# Patient Record
Sex: FEMALE | Race: OTHER | NOT HISPANIC OR LATINO
[De-identification: names, ages, dates, MRNs, and addresses within clinical notes are randomized per-mention and may not be internally consistent; named-entity substitution may affect disease eponyms.]

---

## 2017-10-23 ENCOUNTER — APPOINTMENT (OUTPATIENT)
Dept: INFECTIOUS DISEASE | Facility: CLINIC | Age: 57
End: 2017-10-23

## 2021-05-25 ENCOUNTER — APPOINTMENT (OUTPATIENT)
Dept: SURGICAL ONCOLOGY | Facility: CLINIC | Age: 61
End: 2021-05-25
Payer: COMMERCIAL

## 2021-05-25 VITALS
HEIGHT: 62 IN | BODY MASS INDEX: 27.97 KG/M2 | SYSTOLIC BLOOD PRESSURE: 168 MMHG | WEIGHT: 152 LBS | OXYGEN SATURATION: 96 % | DIASTOLIC BLOOD PRESSURE: 94 MMHG | RESPIRATION RATE: 18 BRPM | HEART RATE: 94 BPM

## 2021-05-25 PROCEDURE — 99204 OFFICE O/P NEW MOD 45 MIN: CPT

## 2021-05-27 NOTE — HISTORY OF PRESENT ILLNESS
[de-identified] : Ms. STEPHANY MACIEL is a 60 year old female who present today for initial consultation for left breast invasive ductal carcinoma. Referred by Dr. Nadia Troncoso\par \par  Patient complaint of left  palpable breast mass since few months, denies nipple discharge, skin changes, inversion or breast pain. Denies constitutional symptoms. \par \par Most recent mammo 5/6/21: showed left 2:00 highly suspicious mass and prominent left axillary lymph node for which an US guided core biopsy was recommended \par \par 5/17/21: left breast US guide core biopsy showed invasive poorly differentiated ductal carcinoma associated with chronic inflammatory infiltrate at the left 12:00 position. The left axillary lymph node biopsy demonstrated a reactive lymph node, negative for carcinoma. These findings are concordant with imaging. ER- ND-, Her 2- pending FISH. Left axillary LN- reactive on pathology.\par \par Family history includes breast cancer in cousin Maternal at age 40. \par \par PCP Dr. Nadia Troncoso \par [address] [facility]\par [place], [place] \par

## 2021-05-27 NOTE — PHYSICAL EXAM
[FreeTextEntry1] : COVID-19 precautions as per [facility] policy was universally followed\par  [de-identified] : left breast palpable mass in the upper outer quadrant- 3cm firm. mobile non tender, bilateral nipple/areolar complex-insignificant, no palpable lymphadenopathy in bilateral axilla and/or neck. A certified chaperone present during my exam at all times.

## 2021-05-27 NOTE — ASSESSMENT
[FreeTextEntry1] : IMP 60 year old female newly diagnosis 2.5 cm left breast invasive ductal carcinoma - suspecting triple negative pending FISH for [id number] status.\par \par PLAN: \par - Will refer patient  to med/onc to discuss systemic chemotherapy options in the neoadjuvant setting as the pt is preferring to consider a lumpectomy. given the size and receptor status- would consider neoadjuvant chemotherapy in this setting. \par - discussed Left breast lumpectomy vs. mastectomy - however she prefers to have a lumpectomy in the future.\par - Discussed with Dr.Patel- Troncoso and . \par - Will be available for port placement if needed.\par I have discussed the diagnosis, therapeutic plan and options with the patient at length. Patient expressed verbal understanding to proceed with proposed plan. All questions answered.\par

## 2021-05-27 NOTE — CONSULT LETTER
[Consult Letter:] : I had the pleasure of evaluating your patient, [unfilled]. [Please see my note below.] : Please see my note below. [Sincerely,] : Sincerely, [Consult Closing:] : Thank you very much for allowing me to participate in the care of this patient.  If you have any questions, please do not hesitate to contact me. [Dear  ___] : Dear  [unfilled], [( Thank you for referring [unfilled] for consultation for _____ )] : Thank you for referring [unfilled] for consultation for [unfilled] [FreeTextEntry2] : \par  [FreeTextEntry3] : Nate Arita MD, FICS, FACS\par , Surgical Oncology \par [employer] \par [address]\[place] [address]\par \[place] [address]\par \par (mob) [phone]\par (o) [phone]\par (f) [phone]\par   [DrRabia  ___] : Dr. XIAO

## 2021-06-03 ENCOUNTER — RESULT REVIEW (OUTPATIENT)
Age: 61
End: 2021-06-03

## 2021-06-03 ENCOUNTER — OUTPATIENT (OUTPATIENT)
Dept: OUTPATIENT SERVICES | Facility: HOSPITAL | Age: 61
LOS: 1 days | End: 2021-06-03
Payer: COMMERCIAL

## 2021-06-03 DIAGNOSIS — C50.912 MALIGNANT NEOPLASM OF UNSPECIFIED SITE OF LEFT FEMALE BREAST: ICD-10-CM

## 2021-06-03 PROCEDURE — 88321 CONSLTJ&REPRT SLD PREP ELSWR: CPT

## 2021-06-04 LAB — SURGICAL PATHOLOGY STUDY: SIGNIFICANT CHANGE UP

## 2021-06-08 ENCOUNTER — APPOINTMENT (OUTPATIENT)
Dept: BREAST CENTER | Facility: CLINIC | Age: 61
End: 2021-06-08
Payer: COMMERCIAL

## 2021-06-08 ENCOUNTER — NON-APPOINTMENT (OUTPATIENT)
Age: 61
End: 2021-06-08

## 2021-06-08 VITALS
WEIGHT: 152 LBS | HEIGHT: 62 IN | SYSTOLIC BLOOD PRESSURE: 147 MMHG | BODY MASS INDEX: 27.97 KG/M2 | DIASTOLIC BLOOD PRESSURE: 78 MMHG | HEART RATE: 101 BPM

## 2021-06-08 DIAGNOSIS — Z78.9 OTHER SPECIFIED HEALTH STATUS: ICD-10-CM

## 2021-06-08 DIAGNOSIS — Z87.891 PERSONAL HISTORY OF NICOTINE DEPENDENCE: ICD-10-CM

## 2021-06-08 PROCEDURE — 99205 OFFICE O/P NEW HI 60 MIN: CPT

## 2021-06-11 PROBLEM — Z87.891 FORMER SMOKER: Status: ACTIVE | Noted: 2021-06-08

## 2021-06-11 PROBLEM — Z78.9 CAFFEINE USE: Status: ACTIVE | Noted: 2021-06-08

## 2021-06-16 ENCOUNTER — APPOINTMENT (OUTPATIENT)
Dept: BREAST CENTER | Facility: CLINIC | Age: 61
End: 2021-06-16
Payer: COMMERCIAL

## 2021-06-16 ENCOUNTER — APPOINTMENT (OUTPATIENT)
Dept: HEMATOLOGY ONCOLOGY | Facility: CLINIC | Age: 61
End: 2021-06-16
Payer: COMMERCIAL

## 2021-06-16 ENCOUNTER — APPOINTMENT (OUTPATIENT)
Dept: BREAST CENTER | Facility: CLINIC | Age: 61
End: 2021-06-16

## 2021-06-16 VITALS
HEART RATE: 87 BPM | HEIGHT: 62 IN | BODY MASS INDEX: 28.52 KG/M2 | TEMPERATURE: 98.3 F | RESPIRATION RATE: 18 BRPM | SYSTOLIC BLOOD PRESSURE: 184 MMHG | WEIGHT: 155 LBS | DIASTOLIC BLOOD PRESSURE: 90 MMHG | OXYGEN SATURATION: 98 %

## 2021-06-16 VITALS — OXYGEN SATURATION: 98 % | BODY MASS INDEX: 28.52 KG/M2 | HEART RATE: 81 BPM | WEIGHT: 155 LBS | HEIGHT: 62 IN

## 2021-06-16 LAB
BASOPHILS # BLD AUTO: 0.02 K/UL
BASOPHILS NFR BLD AUTO: 0.4 %
EOSINOPHIL # BLD AUTO: 0.05 K/UL
EOSINOPHIL NFR BLD AUTO: 0.9 %
HCT VFR BLD CALC: 38.6 %
HGB BLD-MCNC: 12.6 G/DL
IMM GRANULOCYTES NFR BLD AUTO: 0 %
LYMPHOCYTES # BLD AUTO: 2.8 K/UL
LYMPHOCYTES NFR BLD AUTO: 52.9 %
MAN DIFF?: NORMAL
MCHC RBC-ENTMCNC: 27.8 PG
MCHC RBC-ENTMCNC: 32.6 GM/DL
MCV RBC AUTO: 85 FL
MONOCYTES # BLD AUTO: 0.35 K/UL
MONOCYTES NFR BLD AUTO: 6.6 %
NEUTROPHILS # BLD AUTO: 2.07 K/UL
NEUTROPHILS NFR BLD AUTO: 39.2 %
PLATELET # BLD AUTO: 230 K/UL
RBC # BLD: 4.54 M/UL
RBC # FLD: 11.9 %
WBC # FLD AUTO: 5.29 K/UL

## 2021-06-16 PROCEDURE — 99213 OFFICE O/P EST LOW 20 MIN: CPT

## 2021-06-16 PROCEDURE — 99205 OFFICE O/P NEW HI 60 MIN: CPT

## 2021-06-16 NOTE — DATA REVIEWED
[FreeTextEntry1] : 5/6/21: Julián DXMG & US ([facility]): heterogeneously dense, L - 2.6cm ill-defined mass in reigon of palpable concern, inferior areas of asymmetry which efface, US - L - 2.9cm ill-defined hypoechoic mass 2:00 5FN, 0.2cm complicated cyst 11:00 6FN. BIRADS 5.\par 5/12/21: L US Guided bx x2 ([facility]): "R shaped clip"  L 2:00 15FN - 1.2cm invasive ductal carcinoma w/ assoc chronic inflammatory infiltrate, poorly differentiated , ER (-), ID (-), HER2 Equivocal, FISH (+)\par \par L 1.1cm axillary LN 18FN - "S Shaped Clip" reactive LN, negative for carcinoma

## 2021-06-16 NOTE — ASSESSMENT
[FreeTextEntry1] : 59yo female presents for second opinion regarding newly diagnosed L IDC ER/VA (-), HER2 Equivocal FISH (+).\par \par Reviewed pathology and films with patient and discussed treatment plan including surgery, chemotherapy, radiation therapy, and anti-hormonal therapy.\par \par Discussed the role of chemotherapy in the treatment of this patient and informed her that, given her receptor status, she will eventually require chemotherapy with Herceptin. Reviewed the pros and cons of neoadjuvant chemotherapy including reduction in the size of the cancer allowing it to be more amenable to excision wih a lumpectomy. \par \par Patient is to get MRI to assess extent of disease and evaluate for multifocal ipsilateral or additional contralateral disease. \par \par Discussed with patient genetic predisposition to cancer and referred her to genetic counselor for testing.\par \par Patient referred to medical oncologist Dr. Fontenot to discuss neoadjuvant treatment. \par \par Patient to return in one week for reevaluation

## 2021-06-16 NOTE — PHYSICAL EXAM
[Supple] : supple [No Supraclavicular Adenopathy] : no supraclavicular adenopathy [No Cervical Adenopathy] : no cervical adenopathy [Examined in the supine and seated position] : examined in the supine and seated position [No dominant masses] : no dominant masses in right breast  [No Nipple Retraction] : no left nipple retraction [No Nipple Discharge] : no left nipple discharge [No Axillary Lymphadenopathy] : no left axillary lymphadenopathy [de-identified] : palpable mass maycol brian of biopsy proven cancer with sonographic correlate measuring 2.2cm x 3cm transversely and 2.17cm x 2.3cm longitudinally with bx clip noted

## 2021-06-16 NOTE — PAST MEDICAL HISTORY
[Menarche Age ____] : age at menarche was [unfilled] [Menopause Age____] : age at menopause was [unfilled] [Total Preg ___] : G[unfilled] [Live Births ___] : P[unfilled]  [Abortions ___] : Abortions:[unfilled] [FreeTextEntry5] : n/a [FreeTextEntry6] : n/a [FreeTextEntry7] : n/a [FreeTextEntry8] : yes

## 2021-06-16 NOTE — HISTORY OF PRESENT ILLNESS
[FreeTextEntry1] : 59yo female presents for second opinion evaluation in regards to newly diagnosed L IDC initially noted as a 2.6cm illdefined mass on mammogram with a 2.9cm sonographic correalte 2:00 5FN. Patient notes that she felt it 4 months ago and it has grown since then. FHx of breast ca Mcousin 40s, and M Aunt 50s.

## 2021-06-17 ENCOUNTER — LABORATORY RESULT (OUTPATIENT)
Age: 61
End: 2021-06-17

## 2021-06-17 LAB
25(OH)D3 SERPL-MCNC: 13 NG/ML
ALBUMIN SERPL ELPH-MCNC: 4.3 G/DL
ALP BLD-CCNC: 69 U/L
ALT SERPL-CCNC: 19 U/L
ANION GAP SERPL CALC-SCNC: 9 MMOL/L
AST SERPL-CCNC: 23 U/L
BILIRUB SERPL-MCNC: 0.7 MG/DL
BUN SERPL-MCNC: 8 MG/DL
CALCIUM SERPL-MCNC: 9.7 MG/DL
CANCER AG27-29 SERPL-ACNC: 12 U/ML
CEA SERPL-MCNC: 1.1 NG/ML
CHLORIDE SERPL-SCNC: 104 MMOL/L
CO2 SERPL-SCNC: 26 MMOL/L
CREAT SERPL-MCNC: 0.72 MG/DL
CRP SERPL-MCNC: <3 MG/L
ERYTHROCYTE [SEDIMENTATION RATE] IN BLOOD BY WESTERGREN METHOD: 18 MM/HR
GLUCOSE SERPL-MCNC: 88 MG/DL
LDH SERPL-CCNC: 242 U/L
MAGNESIUM SERPL-MCNC: 2.1 MG/DL
PHOSPHATE SERPL-MCNC: 2.5 MG/DL
POTASSIUM SERPL-SCNC: 4.6 MMOL/L
PROT SERPL-MCNC: 7.2 G/DL
SODIUM SERPL-SCNC: 139 MMOL/L
URATE SERPL-MCNC: 4.3 MG/DL

## 2021-06-18 ENCOUNTER — LABORATORY RESULT (OUTPATIENT)
Age: 61
End: 2021-06-18

## 2021-06-18 ENCOUNTER — APPOINTMENT (OUTPATIENT)
Dept: BREAST CENTER | Facility: CLINIC | Age: 61
End: 2021-06-18

## 2021-06-18 ENCOUNTER — APPOINTMENT (OUTPATIENT)
Dept: SURGICAL ONCOLOGY | Facility: AMBULATORY SURGERY CENTER | Age: 61
End: 2021-06-18

## 2021-06-18 ENCOUNTER — NON-APPOINTMENT (OUTPATIENT)
Age: 61
End: 2021-06-18

## 2021-06-23 ENCOUNTER — NON-APPOINTMENT (OUTPATIENT)
Age: 61
End: 2021-06-23

## 2021-06-24 ENCOUNTER — LABORATORY RESULT (OUTPATIENT)
Age: 61
End: 2021-06-24

## 2021-06-24 ENCOUNTER — OUTPATIENT (OUTPATIENT)
Dept: OUTPATIENT SERVICES | Facility: HOSPITAL | Age: 61
LOS: 1 days | End: 2021-06-24
Payer: COMMERCIAL

## 2021-06-24 DIAGNOSIS — C50.912 MALIGNANT NEOPLASM OF UNSPECIFIED SITE OF LEFT FEMALE BREAST: ICD-10-CM

## 2021-06-24 DIAGNOSIS — Z17.1 ESTROGEN RECEPTOR NEGATIVE STATUS [ER-]: ICD-10-CM

## 2021-06-24 PROCEDURE — 93306 TTE W/DOPPLER COMPLETE: CPT

## 2021-06-24 PROCEDURE — 93356 MYOCRD STRAIN IMG SPCKL TRCK: CPT | Mod: 26

## 2021-06-24 PROCEDURE — 93306 TTE W/DOPPLER COMPLETE: CPT | Mod: 26

## 2021-06-25 ENCOUNTER — RESULT REVIEW (OUTPATIENT)
Age: 61
End: 2021-06-25

## 2021-06-25 ENCOUNTER — APPOINTMENT (OUTPATIENT)
Dept: INTERVENTIONAL RADIOLOGY/VASCULAR | Facility: HOSPITAL | Age: 61
End: 2021-06-25
Payer: COMMERCIAL

## 2021-06-25 ENCOUNTER — OUTPATIENT (OUTPATIENT)
Dept: OUTPATIENT SERVICES | Facility: HOSPITAL | Age: 61
LOS: 1 days | End: 2021-06-25
Payer: COMMERCIAL

## 2021-06-25 PROCEDURE — 99152 MOD SED SAME PHYS/QHP 5/>YRS: CPT

## 2021-06-25 PROCEDURE — 99153 MOD SED SAME PHYS/QHP EA: CPT

## 2021-06-25 PROCEDURE — C1769: CPT

## 2021-06-25 PROCEDURE — 36561 INSERT TUNNELED CV CATH: CPT

## 2021-06-25 PROCEDURE — 77001 FLUOROGUIDE FOR VEIN DEVICE: CPT

## 2021-06-25 PROCEDURE — 76937 US GUIDE VASCULAR ACCESS: CPT | Mod: 26

## 2021-06-25 PROCEDURE — C1788: CPT

## 2021-06-25 PROCEDURE — 77001 FLUOROGUIDE FOR VEIN DEVICE: CPT | Mod: 26

## 2021-06-25 PROCEDURE — 76937 US GUIDE VASCULAR ACCESS: CPT

## 2021-06-25 NOTE — ASSESSMENT
[FreeTextEntry1] : 61 yo AA female referred by Dr. Tello Shah for evaluation of newly diagnosed left breast cancer - at least 2.9 cm, \par IDC, ER neg, [place] neg, HER2 patricia IHC equivocal, FISH amplified.  Left axilla LN negative.\par \par Patient accompanied by her . We reviewed the pathobiology of breast cancer, indication for NACT with HER2 targeted treatment.  [facility]. \par \par Side effects and schema reviewed with patient. \par \par \par Plan:\par TCHP  x 6 \par Mediport \par ECHO\par \par Taxotere 75 mg/m2 x 6 q 3 weeks = 129 mg\par Carboplatin AUC 5 x 6 Q 3 weeks = 590 mg\par Herceptin cycle 1 8 mg/kg x 1 = 560 mg\par Herceptin cycle 2- 6  6 mg/kg = 420 mg\par Perjeta 840 mg x 1 cycle 1\par Perjeta cycle 2-6 = 420 mg \par

## 2021-06-25 NOTE — PHYSICAL EXAM
[Fully active, able to carry on all pre-disease performance without restriction] : Status 0 - Fully active, able to carry on all pre-disease performance without restriction [Normal] : bilateral breasts without nipple retraction, skin dimpling or palpable masses; the bilateral axillae are without adenopathy [de-identified] : left breast 2 o'clock - 5 cm form nipple non-fixed mass 5  cm

## 2021-06-25 NOTE — HISTORY OF PRESENT ILLNESS
[Disease: _____________________] : Disease: [unfilled] [T: ___] : T[unfilled] [N: ___] : N[unfilled] [de-identified] : 60 year old female presents today for initial consultation of breast cancer, referred by Dr. Shah.  She was sent for diagnostic mammogram for which she felt a left upper quadrant palpalbe mass x 4 months.  \par \par Most recent mammo 5/6/21: showed left 2:00 highly suspicious mass and prominent left axillary lymph node for which an US guided core biopsy was recommended \par \par 5/17/21: left breast US guide core biopsy showed invasive poorly differentiated ductal carcinoma associated with chronic inflammatory infiltrate at the left 12:00 position. The left axillary lymph node biopsy demonstrated a reactive lymph node, negative for carcinoma. These findings are concordant with imaging. ER- [place]-, Her 2 amplified FISH. Left axillary LN- reactive on pathology.\par \par \par Risk Factors:\par Age at menarche- 11\par Age at menopause-49\par G6,P6\par Age at first live birth-17\par OCP-denies\par HRT-denies\par Family History-denies\par Genetics-denies\par Smoker-former smoker [de-identified] : ER -neg, [place] neg, HER 2 amplified

## 2021-06-29 RX ORDER — CARBOPLATIN 50 MG
590 VIAL (EA) INTRAVENOUS ONCE
Refills: 0 | Status: COMPLETED | OUTPATIENT
Start: 2021-09-22 | End: 2021-09-22

## 2021-06-29 RX ORDER — CARBOPLATIN 50 MG
590 VIAL (EA) INTRAVENOUS ONCE
Refills: 0 | Status: COMPLETED | OUTPATIENT
Start: 2021-10-13 | End: 2021-10-13

## 2021-06-29 RX ORDER — PEGFILGRASTIM-CBQV 6 MG/.6ML
6 INJECTION, SOLUTION SUBCUTANEOUS ONCE
Refills: 0 | Status: COMPLETED | OUTPATIENT
Start: 2021-06-30 | End: 2021-06-30

## 2021-06-29 RX ORDER — ACETAMINOPHEN 500 MG
650 TABLET ORAL ONCE
Refills: 0 | Status: DISCONTINUED | OUTPATIENT
Start: 2021-10-13 | End: 2021-10-27

## 2021-06-29 RX ORDER — SODIUM CHLORIDE 9 MG/ML
250 INJECTION INTRAMUSCULAR; INTRAVENOUS; SUBCUTANEOUS ONCE
Refills: 0 | Status: DISCONTINUED | OUTPATIENT
Start: 2021-07-21 | End: 2021-08-04

## 2021-06-29 RX ORDER — PEGFILGRASTIM-CBQV 6 MG/.6ML
6 INJECTION, SOLUTION SUBCUTANEOUS ONCE
Refills: 0 | Status: COMPLETED | OUTPATIENT
Start: 2021-09-22 | End: 2021-09-22

## 2021-06-29 RX ORDER — CARBOPLATIN 50 MG
590 VIAL (EA) INTRAVENOUS ONCE
Refills: 0 | Status: COMPLETED | OUTPATIENT
Start: 2021-07-21 | End: 2021-07-21

## 2021-06-29 RX ORDER — DEXAMETHASONE 0.5 MG/5ML
12 ELIXIR ORAL ONCE
Refills: 0 | Status: COMPLETED | OUTPATIENT
Start: 2021-07-21 | End: 2021-07-21

## 2021-06-29 RX ORDER — PEGFILGRASTIM-CBQV 6 MG/.6ML
6 INJECTION, SOLUTION SUBCUTANEOUS ONCE
Refills: 0 | Status: COMPLETED | OUTPATIENT
Start: 2021-07-21 | End: 2021-07-21

## 2021-06-29 RX ORDER — DEXAMETHASONE 0.5 MG/5ML
12 ELIXIR ORAL ONCE
Refills: 0 | Status: COMPLETED | OUTPATIENT
Start: 2021-09-22 | End: 2021-09-22

## 2021-06-29 RX ORDER — PALONOSETRON HYDROCHLORIDE 0.25 MG/5ML
0.25 INJECTION, SOLUTION INTRAVENOUS ONCE
Refills: 0 | Status: COMPLETED | OUTPATIENT
Start: 2021-09-22 | End: 2021-09-22

## 2021-06-29 RX ORDER — DOCETAXEL 20 MG/ML
129 INJECTION, SOLUTION, CONCENTRATE INTRAVENOUS ONCE
Refills: 0 | Status: COMPLETED | OUTPATIENT
Start: 2021-09-22 | End: 2021-09-22

## 2021-06-29 RX ORDER — PALONOSETRON HYDROCHLORIDE 0.25 MG/5ML
0.25 INJECTION, SOLUTION INTRAVENOUS ONCE
Refills: 0 | Status: COMPLETED | OUTPATIENT
Start: 2021-07-21 | End: 2021-07-21

## 2021-06-29 RX ORDER — PEGFILGRASTIM-CBQV 6 MG/.6ML
6 INJECTION, SOLUTION SUBCUTANEOUS ONCE
Refills: 0 | Status: COMPLETED | OUTPATIENT
Start: 2021-08-11 | End: 2021-08-11

## 2021-06-29 RX ORDER — PALONOSETRON HYDROCHLORIDE 0.25 MG/5ML
0.25 INJECTION, SOLUTION INTRAVENOUS ONCE
Refills: 0 | Status: COMPLETED | OUTPATIENT
Start: 2021-10-13 | End: 2021-10-13

## 2021-06-29 NOTE — HISTORY OF PRESENT ILLNESS
[FreeTextEntry1] : 61yo female presents for second opinion evaluation in regards to newly diagnosed L IDC initially noted as a 2.6cm illdefined mass on mammogram with a 2.9cm sonographic correalte 2:00 5FN. Patient notes that she felt it 4 months ago and it has grown since then. FHx of breast ca Mcousin 40s, and M Aunt 50s. \par \par She met with Dr. Fontenot earlier today for her initial consult. She has an MRI and an appointment with the genetic counselor scheduled for 6/18.
No

## 2021-06-29 NOTE — PHYSICAL EXAM
[Supple] : supple [No Supraclavicular Adenopathy] : no supraclavicular adenopathy [No Cervical Adenopathy] : no cervical adenopathy [Examined in the supine and seated position] : examined in the supine and seated position [No dominant masses] : no dominant masses in right breast  [No Nipple Retraction] : no left nipple retraction [No Nipple Discharge] : no left nipple discharge [No Axillary Lymphadenopathy] : no left axillary lymphadenopathy [de-identified] : palpable mass in area of bx proven ca 2:00 5FN

## 2021-06-29 NOTE — DATA REVIEWED
[FreeTextEntry1] : 5/6/21: Julián DXMG & US ([facility]): heterogeneously dense, L - 2.6cm ill-defined mass in reigon of palpable concern, inferior areas of asymmetry which efface, US - L - 2.9cm ill-defined hypoechoic mass 2:00 5FN, 0.2cm complicated cyst 11:00 6FN. BIRADS 5.\par 5/12/21: L US Guided bx x2 ([facility]): "R shaped clip"  L 2:00 15FN - 1.2cm invasive ductal carcinoma w/ assoc chronic inflammatory infiltrate, poorly differentiated , ER (-), [place] (-), HER2 Equivocal, FISH (+)\par \par L 1.1cm axillary LN 18FN - "S Shaped Clip" reactive LN, negative for carcinoma

## 2021-06-29 NOTE — PAST MEDICAL HISTORY
[Menarche Age ____] : age at menarche was [unfilled] [Menopause Age____] : age at menopause was [unfilled] [Total Preg ___] : G[unfilled] [Live Births ___] : P[unfilled]  [Abortions ___] : Abortions:[unfilled] [FreeTextEntry5] : n/a [FreeTextEntry7] : n/a [FreeTextEntry6] : n/a [FreeTextEntry8] : yes

## 2021-06-29 NOTE — ASSESSMENT
[FreeTextEntry1] : 59yo female presents for f/u consultation for L IDC ER/NJ (-), HER2 (+). Patient met with medical oncologist Dr. Fontenot today to discuss neoadjuvant chemotherapy. Patient agreed with plan. We discussed treatment plan and reviewed surgical options including lumpectomy with potential contralateral mastopexy for symmetry vs. mastectomy. Informed patient that definitive decision for surgery will be made based off of her preference along with MRI results and response to NAC. \par \par Patient is to have MRI and appointment with genetic counselor on Friday 6/18.\par \par Patient is to continue following with Dr. Fontenot.\par \par Patient is to return for reassessment in 4-6 weeks.

## 2021-06-30 ENCOUNTER — APPOINTMENT (OUTPATIENT)
Age: 61
End: 2021-06-30

## 2021-06-30 ENCOUNTER — APPOINTMENT (OUTPATIENT)
Dept: HEMATOLOGY ONCOLOGY | Facility: CLINIC | Age: 61
End: 2021-06-30
Payer: COMMERCIAL

## 2021-06-30 ENCOUNTER — OUTPATIENT (OUTPATIENT)
Dept: OUTPATIENT SERVICES | Facility: HOSPITAL | Age: 61
LOS: 1 days | End: 2021-06-30
Payer: COMMERCIAL

## 2021-06-30 VITALS
HEIGHT: 62 IN | DIASTOLIC BLOOD PRESSURE: 75 MMHG | TEMPERATURE: 98.3 F | SYSTOLIC BLOOD PRESSURE: 146 MMHG | OXYGEN SATURATION: 97 % | WEIGHT: 153 LBS | RESPIRATION RATE: 18 BRPM | HEART RATE: 107 BPM | BODY MASS INDEX: 28.16 KG/M2

## 2021-06-30 VITALS
RESPIRATION RATE: 20 BRPM | TEMPERATURE: 98 F | HEART RATE: 70 BPM | SYSTOLIC BLOOD PRESSURE: 110 MMHG | OXYGEN SATURATION: 99 % | DIASTOLIC BLOOD PRESSURE: 78 MMHG

## 2021-06-30 DIAGNOSIS — C50.919 MALIGNANT NEOPLASM OF UNSPECIFIED SITE OF UNSPECIFIED FEMALE BREAST: ICD-10-CM

## 2021-06-30 LAB
ALBUMIN SERPL ELPH-MCNC: 3.7 G/DL — SIGNIFICANT CHANGE UP (ref 3.3–5)
ALP SERPL-CCNC: 62 U/L — SIGNIFICANT CHANGE UP (ref 40–120)
ALT FLD-CCNC: 22 U/L — SIGNIFICANT CHANGE UP (ref 10–45)
ANION GAP SERPL CALC-SCNC: 5 MMOL/L — SIGNIFICANT CHANGE UP (ref 5–17)
AST SERPL-CCNC: 25 U/L — SIGNIFICANT CHANGE UP (ref 10–40)
BILIRUB SERPL-MCNC: 0.9 MG/DL — SIGNIFICANT CHANGE UP (ref 0.2–1.2)
BUN SERPL-MCNC: 12 MG/DL — SIGNIFICANT CHANGE UP (ref 7–23)
CALCIUM SERPL-MCNC: 9.6 MG/DL — SIGNIFICANT CHANGE UP (ref 8.4–10.5)
CHLORIDE SERPL-SCNC: 108 MMOL/L — SIGNIFICANT CHANGE UP (ref 96–108)
CO2 SERPL-SCNC: 26 MMOL/L — SIGNIFICANT CHANGE UP (ref 22–31)
CREAT SERPL-MCNC: 0.7 MG/DL — SIGNIFICANT CHANGE UP (ref 0.5–1.3)
GLUCOSE SERPL-MCNC: 130 MG/DL — HIGH (ref 70–99)
HCT VFR BLD CALC: 37.5 % — SIGNIFICANT CHANGE UP (ref 34.5–45)
HGB BLD-MCNC: 12.1 G/DL — SIGNIFICANT CHANGE UP (ref 11.5–15.5)
LYMPHOCYTES # BLD AUTO: 1 K/UL — SIGNIFICANT CHANGE UP (ref 1–3.3)
LYMPHOCYTES # BLD AUTO: 11.9 % — LOW (ref 13–44)
MCHC RBC-ENTMCNC: 28.1 PG — SIGNIFICANT CHANGE UP (ref 27–34)
MCHC RBC-ENTMCNC: 32.3 GM/DL — SIGNIFICANT CHANGE UP (ref 32–36)
MCV RBC AUTO: 87 FL — SIGNIFICANT CHANGE UP (ref 80–100)
NEUTROPHILS # BLD AUTO: 7.4 K/UL — SIGNIFICANT CHANGE UP (ref 1.8–7.4)
NEUTROPHILS NFR BLD AUTO: 87.1 % — HIGH (ref 43–77)
PLATELET # BLD AUTO: 222 K/UL — SIGNIFICANT CHANGE UP (ref 150–400)
POTASSIUM SERPL-MCNC: 3.9 MMOL/L — SIGNIFICANT CHANGE UP (ref 3.5–5.3)
POTASSIUM SERPL-SCNC: 3.9 MMOL/L — SIGNIFICANT CHANGE UP (ref 3.5–5.3)
PROT SERPL-MCNC: 6.9 G/DL — SIGNIFICANT CHANGE UP (ref 6–8.3)
RBC # BLD: 4.31 M/UL — SIGNIFICANT CHANGE UP (ref 3.8–5.2)
RBC # FLD: 12.3 % — SIGNIFICANT CHANGE UP (ref 10.3–14.5)
SODIUM SERPL-SCNC: 139 MMOL/L — SIGNIFICANT CHANGE UP (ref 135–145)
WBC # BLD: 8.5 K/UL — SIGNIFICANT CHANGE UP (ref 3.8–10.5)
WBC # FLD AUTO: 8.5 K/UL — SIGNIFICANT CHANGE UP (ref 3.8–10.5)

## 2021-06-30 PROCEDURE — 99215 OFFICE O/P EST HI 40 MIN: CPT

## 2021-06-30 RX ORDER — SODIUM CHLORIDE 9 MG/ML
250 INJECTION INTRAMUSCULAR; INTRAVENOUS; SUBCUTANEOUS ONCE
Refills: 0 | Status: COMPLETED | OUTPATIENT
Start: 2021-06-30 | End: 2021-06-30

## 2021-06-30 RX ORDER — FOSAPREPITANT DIMEGLUMINE 150 MG/5ML
150 INJECTION, POWDER, LYOPHILIZED, FOR SOLUTION INTRAVENOUS ONCE
Refills: 0 | Status: COMPLETED | OUTPATIENT
Start: 2021-06-30 | End: 2021-06-30

## 2021-06-30 RX ORDER — PALONOSETRON HYDROCHLORIDE 0.25 MG/5ML
0.25 INJECTION, SOLUTION INTRAVENOUS ONCE
Refills: 0 | Status: COMPLETED | OUTPATIENT
Start: 2021-06-30 | End: 2021-06-30

## 2021-06-30 RX ORDER — DEXAMETHASONE 0.5 MG/5ML
12 ELIXIR ORAL ONCE
Refills: 0 | Status: COMPLETED | OUTPATIENT
Start: 2021-06-30 | End: 2021-06-30

## 2021-06-30 RX ORDER — PERTUZUMAB 30 MG/ML
840 INJECTION, SOLUTION, CONCENTRATE INTRAVENOUS ONCE
Refills: 0 | Status: COMPLETED | OUTPATIENT
Start: 2021-06-30 | End: 2021-06-30

## 2021-06-30 RX ORDER — TRASTUZUMAB-DKST 420 MG/20ML
560 INJECTION, POWDER, LYOPHILIZED, FOR SOLUTION INTRAVENOUS ONCE
Refills: 0 | Status: COMPLETED | OUTPATIENT
Start: 2021-06-30 | End: 2021-06-30

## 2021-06-30 RX ORDER — DOCETAXEL 20 MG/ML
129 INJECTION, SOLUTION, CONCENTRATE INTRAVENOUS ONCE
Refills: 0 | Status: COMPLETED | OUTPATIENT
Start: 2021-06-30 | End: 2021-06-30

## 2021-06-30 RX ORDER — CARBOPLATIN 50 MG
590 VIAL (EA) INTRAVENOUS ONCE
Refills: 0 | Status: COMPLETED | OUTPATIENT
Start: 2021-06-30 | End: 2021-06-30

## 2021-06-30 RX ORDER — ACETAMINOPHEN 500 MG
650 TABLET ORAL ONCE
Refills: 0 | Status: COMPLETED | OUTPATIENT
Start: 2021-06-30 | End: 2021-06-30

## 2021-06-30 RX ADMIN — PALONOSETRON HYDROCHLORIDE 0.25 MILLIGRAM(S): 0.25 INJECTION, SOLUTION INTRAVENOUS at 12:26

## 2021-06-30 RX ADMIN — Medication 590 MILLIGRAM(S): at 16:30

## 2021-06-30 RX ADMIN — PEGFILGRASTIM-CBQV 6 MILLIGRAM(S): 6 INJECTION, SOLUTION SUBCUTANEOUS at 18:03

## 2021-06-30 RX ADMIN — TRASTUZUMAB-DKST 560 MILLIGRAM(S): 420 INJECTION, POWDER, LYOPHILIZED, FOR SOLUTION INTRAVENOUS at 15:00

## 2021-06-30 RX ADMIN — Medication 650 MILLIGRAM(S): at 12:25

## 2021-06-30 RX ADMIN — FOSAPREPITANT DIMEGLUMINE 310 MILLIGRAM(S): 150 INJECTION, POWDER, LYOPHILIZED, FOR SOLUTION INTRAVENOUS at 10:40

## 2021-06-30 RX ADMIN — SODIUM CHLORIDE 250 MILLILITER(S): 9 INJECTION INTRAMUSCULAR; INTRAVENOUS; SUBCUTANEOUS at 12:11

## 2021-06-30 RX ADMIN — Medication 618 MILLIGRAM(S): at 16:00

## 2021-06-30 RX ADMIN — Medication 12 MILLIGRAM(S): at 10:40

## 2021-06-30 RX ADMIN — Medication 204.8 MILLIGRAM(S): at 10:26

## 2021-06-30 RX ADMIN — DOCETAXEL 129 MILLIGRAM(S): 20 INJECTION, SOLUTION, CONCENTRATE INTRAVENOUS at 16:00

## 2021-06-30 RX ADMIN — TRASTUZUMAB-DKST 166.67 MILLIGRAM(S): 420 INJECTION, POWDER, LYOPHILIZED, FOR SOLUTION INTRAVENOUS at 13:30

## 2021-06-30 RX ADMIN — PERTUZUMAB 840 MILLIGRAM(S): 30 INJECTION, SOLUTION, CONCENTRATE INTRAVENOUS at 13:30

## 2021-06-30 RX ADMIN — DOCETAXEL 256.45 MILLIGRAM(S): 20 INJECTION, SOLUTION, CONCENTRATE INTRAVENOUS at 15:00

## 2021-06-30 RX ADMIN — FOSAPREPITANT DIMEGLUMINE 150 MILLIGRAM(S): 150 INJECTION, POWDER, LYOPHILIZED, FOR SOLUTION INTRAVENOUS at 11:10

## 2021-06-30 RX ADMIN — PERTUZUMAB 278 MILLIGRAM(S): 30 INJECTION, SOLUTION, CONCENTRATE INTRAVENOUS at 12:30

## 2021-06-30 NOTE — HISTORY OF PRESENT ILLNESS
[de-identified] : 60 year old female presents today for initial consultation of breast cancer, referred by Dr. Shah.  She was sent for diagnostic mammogram for which she felt a left upper quadrant palpalbe mass x 4 months.  \par \par Most recent mammo 5/6/21: showed left 2:00 highly suspicious mass and prominent left axillary lymph node for which an US guided core biopsy was recommended \par \par 5/17/21: left breast US guide core biopsy showed invasive poorly differentiated ductal carcinoma associated with chronic inflammatory infiltrate at the left 12:00 position. The left axillary lymph node biopsy demonstrated a reactive lymph node, negative for carcinoma. These findings are concordant with imaging. ER- [place]-, Her 2 amplified FISH. Left axillary LN- reactive on pathology.\par \par \par Risk Factors:\par Age at menarche- 11\par Age at menopause-49\par G6,P6\par Age at first live birth-17\par OCP-denies\par HRT-denies\par Family History-denies\par Genetics-denies\par Smoker-former smoker [de-identified] : ER -neg, [place] neg, HER 2 amplified

## 2021-06-30 NOTE — ASSESSMENT
[FreeTextEntry1] : 61 yo AA female referred by Dr. Tello Shah for evaluation of newly diagnosed left breast cancer - at least 2.9 cm, \par IDC, ER neg, [place] neg, HER2 patricia IHC equivocal, FISH amplified.  Left axilla LN negative.\par \par Patient accompanied by her . We reviewed the pathobiology of breast cancer, indication for NACT with HER2 targeted treatment.  Patient offered TCHP based on the [facility] protocol. \par \par Side effects and schema reviewed with patient. \par \par 6/30/2021\par pt to start cycle 1 TCHP- tx plan and schema reviewed with pt and spouse\par ECHO 6/24/2021- LVEF 55%\par labs stable\par \par case and mgmt discussed with Dr. Fontenot- return in 1 week for tox check cbc and chem

## 2021-07-02 ENCOUNTER — APPOINTMENT (OUTPATIENT)
Dept: SURGICAL ONCOLOGY | Facility: AMBULATORY SURGERY CENTER | Age: 61
End: 2021-07-02

## 2021-07-02 ENCOUNTER — APPOINTMENT (OUTPATIENT)
Dept: INTERVENTIONAL RADIOLOGY/VASCULAR | Facility: HOSPITAL | Age: 61
End: 2021-07-02

## 2021-07-06 ENCOUNTER — NON-APPOINTMENT (OUTPATIENT)
Age: 61
End: 2021-07-06

## 2021-07-07 ENCOUNTER — APPOINTMENT (OUTPATIENT)
Dept: HEMATOLOGY ONCOLOGY | Facility: CLINIC | Age: 61
End: 2021-07-07

## 2021-07-14 ENCOUNTER — LABORATORY RESULT (OUTPATIENT)
Age: 61
End: 2021-07-14

## 2021-07-14 ENCOUNTER — APPOINTMENT (OUTPATIENT)
Dept: HEMATOLOGY ONCOLOGY | Facility: CLINIC | Age: 61
End: 2021-07-14
Payer: COMMERCIAL

## 2021-07-14 VITALS
DIASTOLIC BLOOD PRESSURE: 77 MMHG | BODY MASS INDEX: 27.23 KG/M2 | WEIGHT: 148 LBS | HEIGHT: 62 IN | RESPIRATION RATE: 18 BRPM | OXYGEN SATURATION: 98 % | SYSTOLIC BLOOD PRESSURE: 121 MMHG | HEART RATE: 105 BPM | TEMPERATURE: 98.5 F

## 2021-07-14 PROCEDURE — 99215 OFFICE O/P EST HI 40 MIN: CPT

## 2021-07-14 NOTE — PHYSICAL EXAM
[Fully active, able to carry on all pre-disease performance without restriction] : Status 0 - Fully active, able to carry on all pre-disease performance without restriction [Normal] : affect appropriate [de-identified] : left breast 2 o'clock - 5 cm form nipple non-fixed mass 5  cm - decreased

## 2021-07-14 NOTE — HISTORY OF PRESENT ILLNESS
[Disease: _____________________] : Disease: [unfilled] [T: ___] : T[unfilled] [N: ___] : N[unfilled] [de-identified] : 60 year old female presents today for initial consultation of breast cancer, referred by Dr. Shah.  She was sent for diagnostic mammogram for which she felt a left upper quadrant palpalbe mass x 4 months.  \par \par Most recent mammo 5/6/21: showed left 2:00 highly suspicious mass and prominent left axillary lymph node for which an US guided core biopsy was recommended \par \par 5/17/21: left breast US guide core biopsy showed invasive poorly differentiated ductal carcinoma associated with chronic inflammatory infiltrate at the left 12:00 position. The left axillary lymph node biopsy demonstrated a reactive lymph node, negative for carcinoma. These findings are concordant with imaging. ER- [place]-, Her 2 amplified FISH. Left axillary LN- reactive on pathology.\par \par \par Risk Factors:\par Age at menarche- 11\par Age at menopause-49\par G6,P6\par Age at first live birth-17\par OCP-denies\par HRT-denies\par Family History-denies\par Genetics-denies\par Smoker-former smoker [de-identified] : ER -neg, [place] neg, HER 2 amplified

## 2021-07-14 NOTE — ASSESSMENT
[FreeTextEntry1] : 59 yo AA female referred by Dr. Tello Shah for evaluation of newly diagnosed left breast cancer - at least 2.9 cm, \par IDC, ER neg, NC neg, HER2 patricia IHC equivocal, FISH amplified.  Left axilla LN negative.\par \par Patient accompanied by her . We reviewed the pathobiology of breast cancer, indication for NACT with HER2 targeted treatment.  Patient offered TCHP based on the [facility] protocol. \par \par Side effects and schema reviewed with patient. \par \par 6/30/2021\par  cycle 1 TCHP- tx plan and schema reviewed with pt and spouse\par ECHO 6/24/2021- LVEF 55%\par labs stable\par \par 7/14/2021\par Cycle 1, day 14\par Nausea - required meds day 3\par Insomnia\par Pelvic pain \par Diarrhea 3 x per day, loose BM -1-2 per day now\par Tingling sensation in fingers \par Pain meds prescribed - tramadol\par Stressed that if no appetite push liquids, and if no tolerance come IV fluids \par  return in 1 week for tx\par \par  check cbc and chem

## 2021-07-19 LAB
CRP SERPL-MCNC: <3 MG/L
ERYTHROCYTE [SEDIMENTATION RATE] IN BLOOD BY WESTERGREN METHOD: 18 MM/HR
LDH SERPL-CCNC: 279 U/L
MAGNESIUM SERPL-MCNC: 2 MG/DL
PHOSPHATE SERPL-MCNC: 1.9 MG/DL
URATE SERPL-MCNC: 4.7 MG/DL

## 2021-07-21 ENCOUNTER — APPOINTMENT (OUTPATIENT)
Dept: HEMATOLOGY ONCOLOGY | Facility: CLINIC | Age: 61
End: 2021-07-21
Payer: COMMERCIAL

## 2021-07-21 ENCOUNTER — OUTPATIENT (OUTPATIENT)
Dept: OUTPATIENT SERVICES | Facility: HOSPITAL | Age: 61
LOS: 1 days | End: 2021-07-21
Payer: COMMERCIAL

## 2021-07-21 ENCOUNTER — APPOINTMENT (OUTPATIENT)
Age: 61
End: 2021-07-21

## 2021-07-21 VITALS
SYSTOLIC BLOOD PRESSURE: 134 MMHG | HEART RATE: 86 BPM | RESPIRATION RATE: 18 BRPM | OXYGEN SATURATION: 97 % | TEMPERATURE: 98 F | DIASTOLIC BLOOD PRESSURE: 78 MMHG

## 2021-07-21 VITALS
OXYGEN SATURATION: 96 % | RESPIRATION RATE: 18 BRPM | SYSTOLIC BLOOD PRESSURE: 144 MMHG | WEIGHT: 154.98 LBS | DIASTOLIC BLOOD PRESSURE: 79 MMHG | HEIGHT: 65 IN | TEMPERATURE: 98 F | HEART RATE: 100 BPM

## 2021-07-21 VITALS
HEIGHT: 62 IN | WEIGHT: 155 LBS | SYSTOLIC BLOOD PRESSURE: 144 MMHG | TEMPERATURE: 98 F | RESPIRATION RATE: 16 BRPM | HEART RATE: 108 BPM | BODY MASS INDEX: 28.52 KG/M2 | DIASTOLIC BLOOD PRESSURE: 79 MMHG | OXYGEN SATURATION: 96 %

## 2021-07-21 DIAGNOSIS — C50.919 MALIGNANT NEOPLASM OF UNSPECIFIED SITE OF UNSPECIFIED FEMALE BREAST: ICD-10-CM

## 2021-07-21 LAB
ALBUMIN SERPL ELPH-MCNC: 3.2 G/DL — LOW (ref 3.3–5)
ALP SERPL-CCNC: 61 U/L — SIGNIFICANT CHANGE UP (ref 40–120)
ALT FLD-CCNC: 36 U/L — SIGNIFICANT CHANGE UP (ref 10–45)
ANION GAP SERPL CALC-SCNC: 7 MMOL/L — SIGNIFICANT CHANGE UP (ref 5–17)
AST SERPL-CCNC: 30 U/L — SIGNIFICANT CHANGE UP (ref 10–40)
BILIRUB SERPL-MCNC: 0.6 MG/DL — SIGNIFICANT CHANGE UP (ref 0.2–1.2)
BUN SERPL-MCNC: 12 MG/DL — SIGNIFICANT CHANGE UP (ref 7–23)
CALCIUM SERPL-MCNC: 8.7 MG/DL — SIGNIFICANT CHANGE UP (ref 8.4–10.5)
CHLORIDE SERPL-SCNC: 106 MMOL/L — SIGNIFICANT CHANGE UP (ref 96–108)
CO2 SERPL-SCNC: 25 MMOL/L — SIGNIFICANT CHANGE UP (ref 22–31)
CREAT SERPL-MCNC: 0.8 MG/DL — SIGNIFICANT CHANGE UP (ref 0.5–1.3)
GLUCOSE SERPL-MCNC: 160 MG/DL — HIGH (ref 70–99)
HCT VFR BLD CALC: 35.3 % — SIGNIFICANT CHANGE UP (ref 34.5–45)
HGB BLD-MCNC: 11.2 G/DL — LOW (ref 11.5–15.5)
LYMPHOCYTES # BLD AUTO: 1.1 K/UL — SIGNIFICANT CHANGE UP (ref 1–3.3)
LYMPHOCYTES # BLD AUTO: 10.7 % — LOW (ref 13–44)
MCHC RBC-ENTMCNC: 27.9 PG — SIGNIFICANT CHANGE UP (ref 27–34)
MCHC RBC-ENTMCNC: 31.7 GM/DL — LOW (ref 32–36)
MCV RBC AUTO: 87.8 FL — SIGNIFICANT CHANGE UP (ref 80–100)
NEUTROPHILS # BLD AUTO: 8.8 K/UL — HIGH (ref 1.8–7.4)
NEUTROPHILS NFR BLD AUTO: 88.1 % — HIGH (ref 43–77)
PLATELET # BLD AUTO: 244 K/UL — SIGNIFICANT CHANGE UP (ref 150–400)
POTASSIUM SERPL-MCNC: 4 MMOL/L — SIGNIFICANT CHANGE UP (ref 3.5–5.3)
POTASSIUM SERPL-SCNC: 4 MMOL/L — SIGNIFICANT CHANGE UP (ref 3.5–5.3)
PROT SERPL-MCNC: 6.1 G/DL — SIGNIFICANT CHANGE UP (ref 6–8.3)
RBC # BLD: 4.02 M/UL — SIGNIFICANT CHANGE UP (ref 3.8–5.2)
RBC # FLD: 13.4 % — SIGNIFICANT CHANGE UP (ref 10.3–14.5)
SODIUM SERPL-SCNC: 138 MMOL/L — SIGNIFICANT CHANGE UP (ref 135–145)
WBC # BLD: 10 K/UL — SIGNIFICANT CHANGE UP (ref 3.8–10.5)
WBC # FLD AUTO: 10 K/UL — SIGNIFICANT CHANGE UP (ref 3.8–10.5)

## 2021-07-21 PROCEDURE — 96375 TX/PRO/DX INJ NEW DRUG ADDON: CPT

## 2021-07-21 PROCEDURE — 36415 COLL VENOUS BLD VENIPUNCTURE: CPT

## 2021-07-21 PROCEDURE — 96367 TX/PROPH/DG ADDL SEQ IV INF: CPT

## 2021-07-21 PROCEDURE — 96413 CHEMO IV INFUSION 1 HR: CPT

## 2021-07-21 PROCEDURE — 85025 COMPLETE CBC W/AUTO DIFF WBC: CPT

## 2021-07-21 PROCEDURE — 96377 APPLICATON ON-BODY INJECTOR: CPT

## 2021-07-21 PROCEDURE — 99215 OFFICE O/P EST HI 40 MIN: CPT

## 2021-07-21 PROCEDURE — 96417 CHEMO IV INFUS EACH ADDL SEQ: CPT

## 2021-07-21 PROCEDURE — 80053 COMPREHEN METABOLIC PANEL: CPT

## 2021-07-21 RX ORDER — PERTUZUMAB 30 MG/ML
420 INJECTION, SOLUTION, CONCENTRATE INTRAVENOUS ONCE
Refills: 0 | Status: COMPLETED | OUTPATIENT
Start: 2021-07-21 | End: 2021-07-21

## 2021-07-21 RX ORDER — TRASTUZUMAB-DKST 420 MG/20ML
420 INJECTION, POWDER, LYOPHILIZED, FOR SOLUTION INTRAVENOUS ONCE
Refills: 0 | Status: COMPLETED | OUTPATIENT
Start: 2021-07-21 | End: 2021-07-21

## 2021-07-21 RX ORDER — FOSAPREPITANT DIMEGLUMINE 150 MG/5ML
150 INJECTION, POWDER, LYOPHILIZED, FOR SOLUTION INTRAVENOUS ONCE
Refills: 0 | Status: COMPLETED | OUTPATIENT
Start: 2021-07-21 | End: 2021-07-21

## 2021-07-21 RX ORDER — ACETAMINOPHEN 500 MG
650 TABLET ORAL ONCE
Refills: 0 | Status: COMPLETED | OUTPATIENT
Start: 2021-07-21 | End: 2021-07-21

## 2021-07-21 RX ORDER — DOCETAXEL 20 MG/ML
129 INJECTION, SOLUTION, CONCENTRATE INTRAVENOUS ONCE
Refills: 0 | Status: COMPLETED | OUTPATIENT
Start: 2021-07-21 | End: 2021-07-21

## 2021-07-21 RX ADMIN — TRASTUZUMAB-DKST 500 MILLIGRAM(S): 420 INJECTION, POWDER, LYOPHILIZED, FOR SOLUTION INTRAVENOUS at 09:40

## 2021-07-21 RX ADMIN — DOCETAXEL 256.45 MILLIGRAM(S): 20 INJECTION, SOLUTION, CONCENTRATE INTRAVENOUS at 13:05

## 2021-07-21 RX ADMIN — Medication 300 UNIT(S): at 15:18

## 2021-07-21 RX ADMIN — Medication 204.8 MILLIGRAM(S): at 11:15

## 2021-07-21 RX ADMIN — PALONOSETRON HYDROCHLORIDE 0.25 MILLIGRAM(S): 0.25 INJECTION, SOLUTION INTRAVENOUS at 12:10

## 2021-07-21 RX ADMIN — Medication 12 MILLIGRAM(S): at 11:30

## 2021-07-21 RX ADMIN — PERTUZUMAB 420 MILLIGRAM(S): 30 INJECTION, SOLUTION, CONCENTRATE INTRAVENOUS at 10:45

## 2021-07-21 RX ADMIN — Medication 650 MILLIGRAM(S): at 11:50

## 2021-07-21 RX ADMIN — Medication 618 MILLIGRAM(S): at 14:14

## 2021-07-21 RX ADMIN — PEGFILGRASTIM-CBQV 6 MILLIGRAM(S): 6 INJECTION, SOLUTION SUBCUTANEOUS at 15:18

## 2021-07-21 RX ADMIN — Medication 590 MILLIGRAM(S): at 14:44

## 2021-07-21 RX ADMIN — TRASTUZUMAB-DKST 420 MILLIGRAM(S): 420 INJECTION, POWDER, LYOPHILIZED, FOR SOLUTION INTRAVENOUS at 10:10

## 2021-07-21 RX ADMIN — PERTUZUMAB 528 MILLIGRAM(S): 30 INJECTION, SOLUTION, CONCENTRATE INTRAVENOUS at 10:15

## 2021-07-21 RX ADMIN — FOSAPREPITANT DIMEGLUMINE 150 MILLIGRAM(S): 150 INJECTION, POWDER, LYOPHILIZED, FOR SOLUTION INTRAVENOUS at 12:10

## 2021-07-21 RX ADMIN — Medication 650 MILLIGRAM(S): at 11:00

## 2021-07-21 RX ADMIN — DOCETAXEL 129 MILLIGRAM(S): 20 INJECTION, SOLUTION, CONCENTRATE INTRAVENOUS at 14:05

## 2021-07-21 RX ADMIN — FOSAPREPITANT DIMEGLUMINE 310 MILLIGRAM(S): 150 INJECTION, POWDER, LYOPHILIZED, FOR SOLUTION INTRAVENOUS at 11:40

## 2021-07-21 NOTE — PHYSICAL EXAM
[Fully active, able to carry on all pre-disease performance without restriction] : Status 0 - Fully active, able to carry on all pre-disease performance without restriction [Normal] : affect appropriate [de-identified] : left breast 2 o'clock - 5 cm form nipple non-fixed mass 5  cm - decreased [de-identified] : trace pedal edema

## 2021-07-21 NOTE — ASSESSMENT
[FreeTextEntry1] : 59 yo AA female referred by Dr. Tello Shah for evaluation of newly diagnosed left breast cancer - at least 2.9 cm, \par IDC, ER neg, [place] neg, HER2 patricia IHC equivocal, FISH amplified.  Left axilla LN negative.\par \par Patient accompanied by her . We reviewed the pathobiology of breast cancer, indication for NACT with HER2 targeted treatment.  Patient offered TCHP based on the [facility] protocol. \par \par Side effects and schema reviewed with patient. \par \par 6/30/2021\par  cycle 1 TCHP- tx plan and schema reviewed with pt and spouse\par ECHO 6/24/2021- LVEF 55%\par labs stable\par \par 7/14/2021\par Cycle 1, day 14\par Nausea - required meds day 3\par Insomnia\par Pelvic pain \par Diarrhea 3 x per day, loose BM -1-2 per day now\par Tingling sensation in fingers \par Pain meds prescribed - tramadol\par Stressed that if no appetite push liquids, and if no tolerance come IV fluids \par  return in 1 week for tx\par \par 7/21/2021\par TCHP # 2- day 1\par - patient feels better, mild pedal edema.\par - tachycardia- baseline - regular\par - BP - she takes amlodipine 2.5 mg - might contribute to swelling, fluid retention\par - extend dexa 5 mg PO BID x 5 days after treatment to alleviate pain and decrease risk of swelling\par - might benefit from HCTZ, will follow up with PCP\par - reviewed side effects\par \par \par  check cbc and chem, TSH

## 2021-07-21 NOTE — HISTORY OF PRESENT ILLNESS
[Disease: _____________________] : Disease: [unfilled] [T: ___] : T[unfilled] [N: ___] : N[unfilled] [de-identified] : 60 year old female presents today for initial consultation of breast cancer, referred by Dr. Shah.  She was sent for diagnostic mammogram for which she felt a left upper quadrant palpalbe mass x 4 months.  \par \par Most recent mammo 5/6/21: showed left 2:00 highly suspicious mass and prominent left axillary lymph node for which an US guided core biopsy was recommended \par \par 5/17/21: left breast US guide core biopsy showed invasive poorly differentiated ductal carcinoma associated with chronic inflammatory infiltrate at the left 12:00 position. The left axillary lymph node biopsy demonstrated a reactive lymph node, negative for carcinoma. These findings are concordant with imaging. ER- [place]-, Her 2 amplified FISH. Left axillary LN- reactive on pathology.\par \par \par Risk Factors:\par Age at menarche- 11\par Age at menopause-49\par G6,P6\par Age at first live birth-17\par OCP-denies\par HRT-denies\par Family History-denies\par Genetics-denies\par Smoker-former smoker [de-identified] : ER -neg, [place] neg, HER 2 amplified

## 2021-07-23 ENCOUNTER — NON-APPOINTMENT (OUTPATIENT)
Age: 61
End: 2021-07-23

## 2021-07-28 ENCOUNTER — APPOINTMENT (OUTPATIENT)
Dept: BREAST CENTER | Facility: CLINIC | Age: 61
End: 2021-07-28
Payer: COMMERCIAL

## 2021-07-28 ENCOUNTER — LABORATORY RESULT (OUTPATIENT)
Age: 61
End: 2021-07-28

## 2021-07-28 ENCOUNTER — APPOINTMENT (OUTPATIENT)
Dept: HEMATOLOGY ONCOLOGY | Facility: CLINIC | Age: 61
End: 2021-07-28
Payer: COMMERCIAL

## 2021-07-28 VITALS
HEART RATE: 94 BPM | OXYGEN SATURATION: 98 % | RESPIRATION RATE: 18 BRPM | DIASTOLIC BLOOD PRESSURE: 72 MMHG | TEMPERATURE: 98.1 F | HEIGHT: 62 IN | SYSTOLIC BLOOD PRESSURE: 125 MMHG

## 2021-07-28 VITALS — WEIGHT: 147 LBS | BODY MASS INDEX: 26.89 KG/M2

## 2021-07-28 VITALS — BODY MASS INDEX: 27.05 KG/M2 | WEIGHT: 147 LBS | HEART RATE: 107 BPM | OXYGEN SATURATION: 97 % | HEIGHT: 62 IN

## 2021-07-28 DIAGNOSIS — R92.8 OTHER ABNORMAL AND INCONCLUSIVE FINDINGS ON DIAGNOSTIC IMAGING OF BREAST: ICD-10-CM

## 2021-07-28 LAB
ALBUMIN SERPL ELPH-MCNC: 4.2 G/DL
ALP BLD-CCNC: 111 U/L
ALT SERPL-CCNC: 48 U/L
ANION GAP SERPL CALC-SCNC: 11 MMOL/L
AST SERPL-CCNC: 22 U/L
BASOPHILS # BLD AUTO: 0 K/UL
BASOPHILS NFR BLD AUTO: 0 %
BILIRUB SERPL-MCNC: 0.4 MG/DL
BUN SERPL-MCNC: 12 MG/DL
CALCIUM SERPL-MCNC: 9.4 MG/DL
CHLORIDE SERPL-SCNC: 102 MMOL/L
CO2 SERPL-SCNC: 27 MMOL/L
CREAT SERPL-MCNC: 0.73 MG/DL
CRP SERPL-MCNC: 5.9 MG/L
EOSINOPHIL # BLD AUTO: 0 K/UL
EOSINOPHIL NFR BLD AUTO: 0 %
ERYTHROCYTE [SEDIMENTATION RATE] IN BLOOD BY WESTERGREN METHOD: 13 MM/HR
GLUCOSE SERPL-MCNC: 97 MG/DL
HCT VFR BLD CALC: 37.2 %
HGB BLD-MCNC: 11.6 G/DL
LDH SERPL-CCNC: 368 U/L
LYMPHOCYTES # BLD AUTO: 7.16 K/UL
LYMPHOCYTES NFR BLD AUTO: 38.8 %
MAGNESIUM SERPL-MCNC: 1.7 MG/DL
MAN DIFF?: NORMAL
MCHC RBC-ENTMCNC: 27.8 PG
MCHC RBC-ENTMCNC: 31.2 GM/DL
MCV RBC AUTO: 89 FL
MONOCYTES # BLD AUTO: 3.51 K/UL
MONOCYTES NFR BLD AUTO: 19 %
NEUTROPHILS # BLD AUTO: 7 K/UL
NEUTROPHILS NFR BLD AUTO: 34.5 %
PHOSPHATE SERPL-MCNC: 2.3 MG/DL
PLATELET # BLD AUTO: 160 K/UL
POTASSIUM SERPL-SCNC: 4.1 MMOL/L
PROT SERPL-MCNC: 6.8 G/DL
RBC # BLD: 4.18 M/UL
RBC # FLD: 13.2 %
SODIUM SERPL-SCNC: 140 MMOL/L
TSH SERPL-ACNC: 4 UIU/ML
URATE SERPL-MCNC: 4.3 MG/DL
WBC # FLD AUTO: 18.46 K/UL

## 2021-07-28 PROCEDURE — 99213 OFFICE O/P EST LOW 20 MIN: CPT

## 2021-07-28 NOTE — ASSESSMENT
[FreeTextEntry1] : 59 yo AA female referred by Dr. Tello Shah for evaluation of newly diagnosed left breast cancer - at least 2.9 cm, \par IDC, ER neg, [place] neg, HER2 patricia IHC equivocal, FISH amplified.  Left axilla LN negative.\par \par Patient accompanied by her . We reviewed the pathobiology of breast cancer, indication for NACT with HER2 targeted treatment.  Patient offered TCHP based on the [facility] protocol. \par \par Side effects and schema reviewed with patient. \par \par 6/30/2021\par  cycle 1 TCHP- tx plan and schema reviewed with pt and spouse\par ECHO 6/24/2021- LVEF 55%\par labs stable\par \par 7/14/2021\par Cycle 1, day 14\par Nausea - required meds day 3\par Insomnia\par Pelvic pain \par Diarrhea 3 x per day, loose BM -1-2 per day now\par Tingling sensation in fingers \par Pain meds prescribed - tramadol\par Stressed that if no appetite push liquids, and if no tolerance come IV fluids \par  return in 1 week for tx\par \par 7/21/2021\par TCHP # 2- day 1\par - patient feels better, mild pedal edema.\par - tachycardia- baseline - regular\par - BP - she takes amlodipine 2.5 mg - might contribute to swelling, fluid retention\par - extend dexa 5 mg PO BID x 5 days after treatment to alleviate pain and decrease risk of swelling\par - might benefit from HCTZ, will follow up with PCP\par - reviewed side effects\par \par 7/28/2021\par s/p TCHP cycle 2- tolerated this cycle better\par BP and HR stable ( repeat HR 98, )\par no swelling\par insomnia most likely related to dex\par loss of appetite\par offered small dose of mirtazapine- pt refused\par oral mucosa reddened- 1 small open sore- magic mouth wash \par \par \par \par \par  check cbc and chem, case and mgmt discussed with Dr. Fontenot

## 2021-07-28 NOTE — HISTORY OF PRESENT ILLNESS
[Disease: _____________________] : Disease: [unfilled] [T: ___] : T[unfilled] [N: ___] : N[unfilled] [de-identified] : 60 year old female presents today for initial consultation of breast cancer, referred by Dr. Shah.  She was sent for diagnostic mammogram for which she felt a left upper quadrant palpalbe mass x 4 months.  \par \par Most recent mammo 5/6/21: showed left 2:00 highly suspicious mass and prominent left axillary lymph node for which an US guided core biopsy was recommended \par \par 5/17/21: left breast US guide core biopsy showed invasive poorly differentiated ductal carcinoma associated with chronic inflammatory infiltrate at the left 12:00 position. The left axillary lymph node biopsy demonstrated a reactive lymph node, negative for carcinoma. These findings are concordant with imaging. ER- [place]-, Her 2 amplified FISH. Left axillary LN- reactive on pathology.\par \par \par Risk Factors:\par Age at menarche- 11\par Age at menopause-49\par G6,P6\par Age at first live birth-17\par OCP-denies\par HRT-denies\par Family History-denies\par Genetics-denies\par Smoker-former smoker [de-identified] : ER -neg, [place] neg, HER 2 amplified  [de-identified] : Pt presents today for tox check

## 2021-07-28 NOTE — PHYSICAL EXAM
[Fully active, able to carry on all pre-disease performance without restriction] : Status 0 - Fully active, able to carry on all pre-disease performance without restriction [Normal] : affect appropriate [de-identified] : left breast 2 o'clock - 5 cm form nipple non-fixed mass 5  cm - decreased [de-identified] : trace pedal edema

## 2021-08-06 NOTE — PHYSICAL EXAM
[Supple] : supple [No Supraclavicular Adenopathy] : no supraclavicular adenopathy [No Cervical Adenopathy] : no cervical adenopathy [Examined in the supine and seated position] : examined in the supine and seated position [No dominant masses] : no dominant masses in right breast  [No Nipple Retraction] : no left nipple retraction [No Nipple Discharge] : no left nipple discharge [No Axillary Lymphadenopathy] : no left axillary lymphadenopathy [de-identified] : resolution of palpable mass at 2:00 5FN at site of bx proven ca

## 2021-08-06 NOTE — ASSESSMENT
[FreeTextEntry1] : 61yo female presents for f/u consultation for L IDC ER/[place] (-), HER2 (+). Patient is currently undergoing chemotherapy with Dr. Fontenot and completed 2/6 courses thus far. Patient is having a good response and palpable mass in L breast has resolved. Patient is to return in 6-8 weeks. MRI recommended abdominal ultrasound for evaluation of incidentally noted liver lesion. Patient is to proceed with abdominal US.

## 2021-08-06 NOTE — DATA REVIEWED
[FreeTextEntry1] : 5/6/21: Julián DXMG & US ([facility]): heterogeneously dense, L - 2.6cm ill-defined mass in reigon of palpable concern, inferior areas of asymmetry which efface, US - L - 2.9cm ill-defined hypoechoic mass 2:00 5FN, 0.2cm complicated cyst 11:00 6FN. BIRADS 5.\par 5/12/21: L US Guided bx x2 ([facility]): "R shaped clip"  L 2:00 15FN - 1.2cm invasive ductal carcinoma w/ assoc chronic inflammatory infiltrate, poorly differentiated , ER (-), [place] (-), HER2 Equivocal, FISH (+)\par \par L 1.1cm axillary LN 18FN - "S Shaped Clip" reactive LN, negative for carcinoma \par \par 6/18/2021 ([facility]) bilateral breast MRI showing 1. The index malignancy in the 2:00 axis of the left breast measures 3.4 cm x 2.3 cm x 2.4 cm. 2. A 3 mm focus of enhancement in the 5:00 axis of the left breast posterior depth is suspicious for malignancy. MR guided core needle biopsy is recommended.\par 3. Incidentally seen is a 2.4 cm T2 bright lesion in the posterior right hepatic dome. Abdominal ultrasound is recommended. MRI guided biopsy of left 5:00 axis recommended. BIRADS 4\par

## 2021-08-11 ENCOUNTER — OUTPATIENT (OUTPATIENT)
Dept: OUTPATIENT SERVICES | Facility: HOSPITAL | Age: 61
LOS: 1 days | End: 2021-08-11
Payer: COMMERCIAL

## 2021-08-11 ENCOUNTER — APPOINTMENT (OUTPATIENT)
Dept: HEMATOLOGY ONCOLOGY | Facility: CLINIC | Age: 61
End: 2021-08-11
Payer: COMMERCIAL

## 2021-08-11 ENCOUNTER — APPOINTMENT (OUTPATIENT)
Age: 61
End: 2021-08-11

## 2021-08-11 VITALS
WEIGHT: 154.98 LBS | HEIGHT: 65 IN | DIASTOLIC BLOOD PRESSURE: 76 MMHG | SYSTOLIC BLOOD PRESSURE: 147 MMHG | TEMPERATURE: 98 F | HEART RATE: 114 BPM | OXYGEN SATURATION: 97 % | RESPIRATION RATE: 16 BRPM

## 2021-08-11 VITALS
OXYGEN SATURATION: 97 % | SYSTOLIC BLOOD PRESSURE: 162 MMHG | HEART RATE: 91 BPM | RESPIRATION RATE: 16 BRPM | TEMPERATURE: 97 F | DIASTOLIC BLOOD PRESSURE: 85 MMHG

## 2021-08-11 VITALS
BODY MASS INDEX: 28.52 KG/M2 | WEIGHT: 155 LBS | SYSTOLIC BLOOD PRESSURE: 148 MMHG | HEIGHT: 62 IN | HEART RATE: 123 BPM | OXYGEN SATURATION: 97 % | TEMPERATURE: 98 F | RESPIRATION RATE: 18 BRPM | DIASTOLIC BLOOD PRESSURE: 77 MMHG

## 2021-08-11 DIAGNOSIS — C50.919 MALIGNANT NEOPLASM OF UNSPECIFIED SITE OF UNSPECIFIED FEMALE BREAST: ICD-10-CM

## 2021-08-11 LAB
ALBUMIN SERPL ELPH-MCNC: 3.2 G/DL — LOW (ref 3.3–5)
ALP SERPL-CCNC: 99 U/L — SIGNIFICANT CHANGE UP (ref 40–120)
ALT FLD-CCNC: 50 U/L — HIGH (ref 10–45)
ANION GAP SERPL CALC-SCNC: 7 MMOL/L — SIGNIFICANT CHANGE UP (ref 5–17)
AST SERPL-CCNC: 37 U/L — SIGNIFICANT CHANGE UP (ref 10–40)
BILIRUB SERPL-MCNC: 0.7 MG/DL — SIGNIFICANT CHANGE UP (ref 0.2–1.2)
BUN SERPL-MCNC: 14 MG/DL — SIGNIFICANT CHANGE UP (ref 7–23)
CALCIUM SERPL-MCNC: 9.3 MG/DL — SIGNIFICANT CHANGE UP (ref 8.4–10.5)
CHLORIDE SERPL-SCNC: 111 MMOL/L — HIGH (ref 96–108)
CO2 SERPL-SCNC: 25 MMOL/L — SIGNIFICANT CHANGE UP (ref 22–31)
CREAT SERPL-MCNC: 0.7 MG/DL — SIGNIFICANT CHANGE UP (ref 0.5–1.3)
GLUCOSE SERPL-MCNC: 221 MG/DL — HIGH (ref 70–99)
HCT VFR BLD CALC: 35.2 % — SIGNIFICANT CHANGE UP (ref 34.5–45)
HGB BLD-MCNC: 11.1 G/DL — LOW (ref 11.5–15.5)
LYMPHOCYTES # BLD AUTO: 0.9 K/UL — LOW (ref 1–3.3)
LYMPHOCYTES # BLD AUTO: 8.5 % — LOW (ref 13–44)
MCHC RBC-ENTMCNC: 28.4 PG — SIGNIFICANT CHANGE UP (ref 27–34)
MCHC RBC-ENTMCNC: 31.5 GM/DL — LOW (ref 32–36)
MCV RBC AUTO: 90 FL — SIGNIFICANT CHANGE UP (ref 80–100)
NEUTROPHILS # BLD AUTO: 9.8 K/UL — HIGH (ref 1.8–7.4)
NEUTROPHILS NFR BLD AUTO: 91.2 % — HIGH (ref 43–77)
PLATELET # BLD AUTO: 200 K/UL — SIGNIFICANT CHANGE UP (ref 150–400)
POTASSIUM SERPL-MCNC: 3.7 MMOL/L — SIGNIFICANT CHANGE UP (ref 3.5–5.3)
POTASSIUM SERPL-SCNC: 3.7 MMOL/L — SIGNIFICANT CHANGE UP (ref 3.5–5.3)
PROT SERPL-MCNC: 6.1 G/DL — SIGNIFICANT CHANGE UP (ref 6–8.3)
RBC # BLD: 3.91 M/UL — SIGNIFICANT CHANGE UP (ref 3.8–5.2)
RBC # FLD: 15 % — HIGH (ref 10.3–14.5)
SODIUM SERPL-SCNC: 143 MMOL/L — SIGNIFICANT CHANGE UP (ref 135–145)
WBC # BLD: 10.7 K/UL — HIGH (ref 3.8–10.5)
WBC # FLD AUTO: 10.7 K/UL — HIGH (ref 3.8–10.5)

## 2021-08-11 PROCEDURE — 96361 HYDRATE IV INFUSION ADD-ON: CPT

## 2021-08-11 PROCEDURE — 99215 OFFICE O/P EST HI 40 MIN: CPT

## 2021-08-11 PROCEDURE — 36415 COLL VENOUS BLD VENIPUNCTURE: CPT

## 2021-08-11 PROCEDURE — 96375 TX/PRO/DX INJ NEW DRUG ADDON: CPT

## 2021-08-11 PROCEDURE — 96417 CHEMO IV INFUS EACH ADDL SEQ: CPT

## 2021-08-11 PROCEDURE — 96367 TX/PROPH/DG ADDL SEQ IV INF: CPT

## 2021-08-11 PROCEDURE — 96377 APPLICATON ON-BODY INJECTOR: CPT

## 2021-08-11 PROCEDURE — 96413 CHEMO IV INFUSION 1 HR: CPT

## 2021-08-11 PROCEDURE — 80053 COMPREHEN METABOLIC PANEL: CPT

## 2021-08-11 PROCEDURE — 85025 COMPLETE CBC W/AUTO DIFF WBC: CPT

## 2021-08-11 RX ORDER — CARBOPLATIN 50 MG
590 VIAL (EA) INTRAVENOUS ONCE
Refills: 0 | Status: COMPLETED | OUTPATIENT
Start: 2021-08-11 | End: 2021-08-11

## 2021-08-11 RX ORDER — DEXAMETHASONE 0.5 MG/5ML
12 ELIXIR ORAL ONCE
Refills: 0 | Status: COMPLETED | OUTPATIENT
Start: 2021-08-11 | End: 2021-08-11

## 2021-08-11 RX ORDER — DOCETAXEL 20 MG/ML
129 INJECTION, SOLUTION, CONCENTRATE INTRAVENOUS ONCE
Refills: 0 | Status: COMPLETED | OUTPATIENT
Start: 2021-08-11 | End: 2021-08-11

## 2021-08-11 RX ORDER — ACETAMINOPHEN 500 MG
650 TABLET ORAL ONCE
Refills: 0 | Status: COMPLETED | OUTPATIENT
Start: 2021-08-11 | End: 2021-08-11

## 2021-08-11 RX ORDER — PALONOSETRON HYDROCHLORIDE 0.25 MG/5ML
0.25 INJECTION, SOLUTION INTRAVENOUS ONCE
Refills: 0 | Status: COMPLETED | OUTPATIENT
Start: 2021-08-11 | End: 2021-08-11

## 2021-08-11 RX ORDER — SODIUM CHLORIDE 9 MG/ML
250 INJECTION INTRAMUSCULAR; INTRAVENOUS; SUBCUTANEOUS ONCE
Refills: 0 | Status: COMPLETED | OUTPATIENT
Start: 2021-08-11 | End: 2021-08-11

## 2021-08-11 RX ORDER — FOSAPREPITANT DIMEGLUMINE 150 MG/5ML
150 INJECTION, POWDER, LYOPHILIZED, FOR SOLUTION INTRAVENOUS ONCE
Refills: 0 | Status: COMPLETED | OUTPATIENT
Start: 2021-08-11 | End: 2021-08-11

## 2021-08-11 RX ORDER — TRASTUZUMAB-DKST 420 MG/20ML
420 INJECTION, POWDER, LYOPHILIZED, FOR SOLUTION INTRAVENOUS ONCE
Refills: 0 | Status: COMPLETED | OUTPATIENT
Start: 2021-08-11 | End: 2021-08-11

## 2021-08-11 RX ORDER — PERTUZUMAB 30 MG/ML
420 INJECTION, SOLUTION, CONCENTRATE INTRAVENOUS ONCE
Refills: 0 | Status: COMPLETED | OUTPATIENT
Start: 2021-08-11 | End: 2021-08-11

## 2021-08-11 RX ADMIN — DOCETAXEL 129 MILLIGRAM(S): 20 INJECTION, SOLUTION, CONCENTRATE INTRAVENOUS at 14:10

## 2021-08-11 RX ADMIN — Medication 650 MILLIGRAM(S): at 11:03

## 2021-08-11 RX ADMIN — SODIUM CHLORIDE 250 MILLILITER(S): 9 INJECTION INTRAMUSCULAR; INTRAVENOUS; SUBCUTANEOUS at 10:00

## 2021-08-11 RX ADMIN — TRASTUZUMAB-DKST 500 MILLIGRAM(S): 420 INJECTION, POWDER, LYOPHILIZED, FOR SOLUTION INTRAVENOUS at 11:10

## 2021-08-11 RX ADMIN — FOSAPREPITANT DIMEGLUMINE 310 MILLIGRAM(S): 150 INJECTION, POWDER, LYOPHILIZED, FOR SOLUTION INTRAVENOUS at 12:20

## 2021-08-11 RX ADMIN — Medication 590 MILLIGRAM(S): at 14:45

## 2021-08-11 RX ADMIN — PEGFILGRASTIM-CBQV 6 MILLIGRAM(S): 6 INJECTION, SOLUTION SUBCUTANEOUS at 14:50

## 2021-08-11 RX ADMIN — Medication 650 MILLIGRAM(S): at 10:46

## 2021-08-11 RX ADMIN — DOCETAXEL 256.45 MILLIGRAM(S): 20 INJECTION, SOLUTION, CONCENTRATE INTRAVENOUS at 13:10

## 2021-08-11 RX ADMIN — PERTUZUMAB 528 MILLIGRAM(S): 30 INJECTION, SOLUTION, CONCENTRATE INTRAVENOUS at 11:45

## 2021-08-11 RX ADMIN — Medication 12 MILLIGRAM(S): at 13:05

## 2021-08-11 RX ADMIN — SODIUM CHLORIDE 250 MILLILITER(S): 9 INJECTION INTRAMUSCULAR; INTRAVENOUS; SUBCUTANEOUS at 11:00

## 2021-08-11 RX ADMIN — FOSAPREPITANT DIMEGLUMINE 150 MILLIGRAM(S): 150 INJECTION, POWDER, LYOPHILIZED, FOR SOLUTION INTRAVENOUS at 12:50

## 2021-08-11 RX ADMIN — TRASTUZUMAB-DKST 420 MILLIGRAM(S): 420 INJECTION, POWDER, LYOPHILIZED, FOR SOLUTION INTRAVENOUS at 11:40

## 2021-08-11 RX ADMIN — PERTUZUMAB 420 MILLIGRAM(S): 30 INJECTION, SOLUTION, CONCENTRATE INTRAVENOUS at 12:15

## 2021-08-11 RX ADMIN — PALONOSETRON HYDROCHLORIDE 0.25 MILLIGRAM(S): 0.25 INJECTION, SOLUTION INTRAVENOUS at 12:17

## 2021-08-11 RX ADMIN — Medication 204.8 MILLIGRAM(S): at 12:50

## 2021-08-11 RX ADMIN — Medication 300 UNIT(S): at 14:45

## 2021-08-11 RX ADMIN — Medication 618 MILLIGRAM(S): at 14:15

## 2021-08-11 NOTE — ASSESSMENT
[FreeTextEntry1] : 61 yo AA female referred by Dr. Tello Shah for evaluation of newly diagnosed left breast cancer - at least 2.9 cm, \par IDC, ER neg, [place] neg, HER2 patricia IHC equivocal, FISH amplified.  Left axilla LN negative.\par \par Patient accompanied by her . We reviewed the pathobiology of breast cancer, indication for NACT with HER2 targeted treatment.  [facility] \par \par Side effects and schema reviewed with patient. \par \par 6/30/2021\par  cycle 1 TCHP- tx plan and schema reviewed with pt and spouse\par ECHO 6/24/2021- LVEF 55%\par labs stable\par \par 7/14/2021\par Cycle 1, day 14\par Nausea - required meds day 3\par Insomnia\par Pelvic pain \par Diarrhea 3 x per day, loose BM -1-2 per day now\par Tingling sensation in fingers \par Pain meds prescribed - tramadol\par Stressed that if no appetite push liquids, and if no tolerance come IV fluids \par  return in 1 week for tx\par \par 7/21/2021\par TCHP # 2- day 1\par - patient feels better, mild pedal edema.\par - tachycardia- baseline - regular\par - BP - she takes amlodipine 2.5 mg - might contribute to swelling, fluid retention\par - extend dexa 5 mg PO BID x 5 days after treatment to alleviate pain and decrease risk of swelling\par - might benefit from HCTZ, will follow up with PCP\par - reviewed side effects\par \par 7/28/2021\par s/p TCHP cycle 2- tolerated this cycle better\par BP and HR stable ( repeat HR 98, )\par no swelling\par insomnia most likely related to dex\par loss of appetite\par offered small dose of mirtazapine- pt refused\par oral mucosa reddened- 1 small open sore- magic mouth wash \par \par 8/11/2021\par Patient for cycle 3 TCHP\par felt much better after cycle 2, felt Zyrtec decreased bone pain\par Denies nausea, diarrhea\par Mouth sore resolved, cannot afford Magic Mouthwash- send for viscus lidocaine PRN and will use salt water\par weight back to baseline\par Trace of pedal edema resolved\par \par \par \par \par \par  check cbc and chem, case and mgmt discussed with Dr. Fontenot

## 2021-08-11 NOTE — HISTORY OF PRESENT ILLNESS
[Disease: _____________________] : Disease: [unfilled] [T: ___] : T[unfilled] [N: ___] : N[unfilled] [de-identified] : 60 year old female presents today for initial consultation of breast cancer, referred by Dr. Shah.  She was sent for diagnostic mammogram for which she felt a left upper quadrant palpalbe mass x 4 months.  \par \par Most recent mammo 5/6/21: showed left 2:00 highly suspicious mass and prominent left axillary lymph node for which an US guided core biopsy was recommended \par \par 5/17/21: left breast US guide core biopsy showed invasive poorly differentiated ductal carcinoma associated with chronic inflammatory infiltrate at the left 12:00 position. The left axillary lymph node biopsy demonstrated a reactive lymph node, negative for carcinoma. These findings are concordant with imaging. ER- [place]-, Her 2 amplified FISH. Left axillary LN- reactive on pathology.\par \par \par Risk Factors:\par Age at menarche- 11\par Age at menopause-49\par G6,P6\par Age at first live birth-17\par OCP-denies\par HRT-denies\par Family History-denies\par Genetics-denies\par Smoker-former smoker [de-identified] : ER -neg, NE neg, HER 2 amplified  [de-identified] : Pt presents today for tox check

## 2021-08-11 NOTE — PHYSICAL EXAM
[Fully active, able to carry on all pre-disease performance without restriction] : Status 0 - Fully active, able to carry on all pre-disease performance without restriction [Normal] : affect appropriate [de-identified] : left breast 2 o'clock - 5 cm form nipple non-fixed mass 5  cm - decreased [de-identified] : trace pedal edema

## 2021-08-16 ENCOUNTER — NON-APPOINTMENT (OUTPATIENT)
Age: 61
End: 2021-08-16

## 2021-08-24 ENCOUNTER — NON-APPOINTMENT (OUTPATIENT)
Age: 61
End: 2021-08-24

## 2021-09-01 ENCOUNTER — APPOINTMENT (OUTPATIENT)
Age: 61
End: 2021-09-01

## 2021-09-01 ENCOUNTER — APPOINTMENT (OUTPATIENT)
Dept: HEMATOLOGY ONCOLOGY | Facility: CLINIC | Age: 61
End: 2021-09-01

## 2021-09-01 ENCOUNTER — OUTPATIENT (OUTPATIENT)
Dept: OUTPATIENT SERVICES | Facility: HOSPITAL | Age: 61
LOS: 1 days | End: 2021-09-01
Payer: COMMERCIAL

## 2021-09-01 VITALS
WEIGHT: 154.98 LBS | TEMPERATURE: 97 F | SYSTOLIC BLOOD PRESSURE: 160 MMHG | RESPIRATION RATE: 17 BRPM | OXYGEN SATURATION: 99 % | HEIGHT: 65 IN | DIASTOLIC BLOOD PRESSURE: 81 MMHG | HEART RATE: 90 BPM

## 2021-09-01 DIAGNOSIS — C50.919 MALIGNANT NEOPLASM OF UNSPECIFIED SITE OF UNSPECIFIED FEMALE BREAST: ICD-10-CM

## 2021-09-01 LAB
ALBUMIN SERPL ELPH-MCNC: 3.4 G/DL — SIGNIFICANT CHANGE UP (ref 3.3–5)
ALP SERPL-CCNC: 70 U/L — SIGNIFICANT CHANGE UP (ref 40–120)
ALT FLD-CCNC: 30 U/L — SIGNIFICANT CHANGE UP (ref 10–45)
ANION GAP SERPL CALC-SCNC: 3 MMOL/L — LOW (ref 5–17)
AST SERPL-CCNC: 30 U/L — SIGNIFICANT CHANGE UP (ref 10–40)
BILIRUB SERPL-MCNC: 0.8 MG/DL — SIGNIFICANT CHANGE UP (ref 0.2–1.2)
BUN SERPL-MCNC: 10 MG/DL — SIGNIFICANT CHANGE UP (ref 7–23)
CALCIUM SERPL-MCNC: 9.8 MG/DL — SIGNIFICANT CHANGE UP (ref 8.4–10.5)
CHLORIDE SERPL-SCNC: 111 MMOL/L — HIGH (ref 96–108)
CO2 SERPL-SCNC: 28 MMOL/L — SIGNIFICANT CHANGE UP (ref 22–31)
CREAT SERPL-MCNC: 0.9 MG/DL — SIGNIFICANT CHANGE UP (ref 0.5–1.3)
GLUCOSE SERPL-MCNC: 139 MG/DL — HIGH (ref 70–99)
HCT VFR BLD CALC: 36 % — SIGNIFICANT CHANGE UP (ref 34.5–45)
HGB BLD-MCNC: 11.3 G/DL — LOW (ref 11.5–15.5)
LYMPHOCYTES # BLD AUTO: 1.1 K/UL — SIGNIFICANT CHANGE UP (ref 1–3.3)
LYMPHOCYTES # BLD AUTO: 14.9 % — SIGNIFICANT CHANGE UP (ref 13–44)
MCHC RBC-ENTMCNC: 28.7 PG — SIGNIFICANT CHANGE UP (ref 27–34)
MCHC RBC-ENTMCNC: 31.4 GM/DL — LOW (ref 32–36)
MCV RBC AUTO: 91.4 FL — SIGNIFICANT CHANGE UP (ref 80–100)
NEUTROPHILS # BLD AUTO: 6.5 K/UL — SIGNIFICANT CHANGE UP (ref 1.8–7.4)
NEUTROPHILS NFR BLD AUTO: 83.9 % — HIGH (ref 43–77)
PLATELET # BLD AUTO: 189 K/UL — SIGNIFICANT CHANGE UP (ref 150–400)
POTASSIUM SERPL-MCNC: 4 MMOL/L — SIGNIFICANT CHANGE UP (ref 3.5–5.3)
POTASSIUM SERPL-SCNC: 4 MMOL/L — SIGNIFICANT CHANGE UP (ref 3.5–5.3)
PROT SERPL-MCNC: 6.3 G/DL — SIGNIFICANT CHANGE UP (ref 6–8.3)
RBC # BLD: 3.94 M/UL — SIGNIFICANT CHANGE UP (ref 3.8–5.2)
RBC # FLD: 15.7 % — HIGH (ref 10.3–14.5)
SODIUM SERPL-SCNC: 142 MMOL/L — SIGNIFICANT CHANGE UP (ref 135–145)
WBC # BLD: 7.7 K/UL — SIGNIFICANT CHANGE UP (ref 3.8–10.5)
WBC # FLD AUTO: 7.7 K/UL — SIGNIFICANT CHANGE UP (ref 3.8–10.5)

## 2021-09-01 PROCEDURE — 96375 TX/PRO/DX INJ NEW DRUG ADDON: CPT

## 2021-09-01 PROCEDURE — 96361 HYDRATE IV INFUSION ADD-ON: CPT

## 2021-09-01 PROCEDURE — 36415 COLL VENOUS BLD VENIPUNCTURE: CPT

## 2021-09-01 PROCEDURE — 80053 COMPREHEN METABOLIC PANEL: CPT

## 2021-09-01 PROCEDURE — 96413 CHEMO IV INFUSION 1 HR: CPT

## 2021-09-01 PROCEDURE — 96377 APPLICATON ON-BODY INJECTOR: CPT

## 2021-09-01 PROCEDURE — 96417 CHEMO IV INFUS EACH ADDL SEQ: CPT

## 2021-09-01 PROCEDURE — 85025 COMPLETE CBC W/AUTO DIFF WBC: CPT

## 2021-09-01 PROCEDURE — 96367 TX/PROPH/DG ADDL SEQ IV INF: CPT

## 2021-09-01 RX ORDER — TRASTUZUMAB-DKST 420 MG/20ML
420 INJECTION, POWDER, LYOPHILIZED, FOR SOLUTION INTRAVENOUS ONCE
Refills: 0 | Status: COMPLETED | OUTPATIENT
Start: 2021-09-01 | End: 2021-09-01

## 2021-09-01 RX ORDER — CARBOPLATIN 50 MG
590 VIAL (EA) INTRAVENOUS ONCE
Refills: 0 | Status: COMPLETED | OUTPATIENT
Start: 2021-09-01 | End: 2021-09-01

## 2021-09-01 RX ORDER — DOCETAXEL 20 MG/ML
129 INJECTION, SOLUTION, CONCENTRATE INTRAVENOUS ONCE
Refills: 0 | Status: COMPLETED | OUTPATIENT
Start: 2021-09-01 | End: 2021-09-01

## 2021-09-01 RX ORDER — ACETAMINOPHEN 500 MG
650 TABLET ORAL ONCE
Refills: 0 | Status: COMPLETED | OUTPATIENT
Start: 2021-09-01 | End: 2021-09-01

## 2021-09-01 RX ORDER — DEXAMETHASONE 0.5 MG/5ML
12 ELIXIR ORAL ONCE
Refills: 0 | Status: COMPLETED | OUTPATIENT
Start: 2021-09-01 | End: 2021-09-01

## 2021-09-01 RX ORDER — SODIUM CHLORIDE 9 MG/ML
250 INJECTION INTRAMUSCULAR; INTRAVENOUS; SUBCUTANEOUS ONCE
Refills: 0 | Status: COMPLETED | OUTPATIENT
Start: 2021-09-01 | End: 2021-09-01

## 2021-09-01 RX ORDER — PALONOSETRON HYDROCHLORIDE 0.25 MG/5ML
0.25 INJECTION, SOLUTION INTRAVENOUS ONCE
Refills: 0 | Status: COMPLETED | OUTPATIENT
Start: 2021-09-01 | End: 2021-09-01

## 2021-09-01 RX ORDER — FOSAPREPITANT DIMEGLUMINE 150 MG/5ML
150 INJECTION, POWDER, LYOPHILIZED, FOR SOLUTION INTRAVENOUS ONCE
Refills: 0 | Status: COMPLETED | OUTPATIENT
Start: 2021-09-01 | End: 2021-09-01

## 2021-09-01 RX ORDER — PEGFILGRASTIM-CBQV 6 MG/.6ML
6 INJECTION, SOLUTION SUBCUTANEOUS ONCE
Refills: 0 | Status: COMPLETED | OUTPATIENT
Start: 2021-09-01 | End: 2021-09-01

## 2021-09-01 RX ORDER — PERTUZUMAB 30 MG/ML
420 INJECTION, SOLUTION, CONCENTRATE INTRAVENOUS ONCE
Refills: 0 | Status: COMPLETED | OUTPATIENT
Start: 2021-09-01 | End: 2021-09-01

## 2021-09-01 RX ADMIN — DOCETAXEL 256.45 MILLIGRAM(S): 20 INJECTION, SOLUTION, CONCENTRATE INTRAVENOUS at 14:28

## 2021-09-01 RX ADMIN — PERTUZUMAB 528 MILLIGRAM(S): 30 INJECTION, SOLUTION, CONCENTRATE INTRAVENOUS at 12:11

## 2021-09-01 RX ADMIN — Medication 212 MILLIGRAM(S): at 13:32

## 2021-09-01 RX ADMIN — DOCETAXEL 129 MILLIGRAM(S): 20 INJECTION, SOLUTION, CONCENTRATE INTRAVENOUS at 15:28

## 2021-09-01 RX ADMIN — PEGFILGRASTIM-CBQV 6 MILLIGRAM(S): 6 INJECTION, SOLUTION SUBCUTANEOUS at 16:15

## 2021-09-01 RX ADMIN — TRASTUZUMAB-DKST 420 MILLIGRAM(S): 420 INJECTION, POWDER, LYOPHILIZED, FOR SOLUTION INTRAVENOUS at 13:05

## 2021-09-01 RX ADMIN — Medication 590 MILLIGRAM(S): at 16:00

## 2021-09-01 RX ADMIN — SODIUM CHLORIDE 250 MILLILITER(S): 9 INJECTION INTRAMUSCULAR; INTRAVENOUS; SUBCUTANEOUS at 11:16

## 2021-09-01 RX ADMIN — Medication 650 MILLIGRAM(S): at 15:02

## 2021-09-01 RX ADMIN — SODIUM CHLORIDE 250 MILLILITER(S): 9 INJECTION INTRAMUSCULAR; INTRAVENOUS; SUBCUTANEOUS at 12:24

## 2021-09-01 RX ADMIN — PERTUZUMAB 420 MILLIGRAM(S): 30 INJECTION, SOLUTION, CONCENTRATE INTRAVENOUS at 12:35

## 2021-09-01 RX ADMIN — Medication 300 UNIT(S): at 16:15

## 2021-09-01 RX ADMIN — Medication 12 MILLIGRAM(S): at 13:47

## 2021-09-01 RX ADMIN — PALONOSETRON HYDROCHLORIDE 0.25 MILLIGRAM(S): 0.25 INJECTION, SOLUTION INTRAVENOUS at 14:05

## 2021-09-01 RX ADMIN — FOSAPREPITANT DIMEGLUMINE 310 MILLIGRAM(S): 150 INJECTION, POWDER, LYOPHILIZED, FOR SOLUTION INTRAVENOUS at 13:32

## 2021-09-01 RX ADMIN — TRASTUZUMAB-DKST 540 MILLIGRAM(S): 420 INJECTION, POWDER, LYOPHILIZED, FOR SOLUTION INTRAVENOUS at 12:36

## 2021-09-01 RX ADMIN — Medication 650 MILLIGRAM(S): at 13:32

## 2021-09-01 RX ADMIN — FOSAPREPITANT DIMEGLUMINE 150 MILLIGRAM(S): 150 INJECTION, POWDER, LYOPHILIZED, FOR SOLUTION INTRAVENOUS at 14:02

## 2021-09-01 RX ADMIN — Medication 618 MILLIGRAM(S): at 15:29

## 2021-09-08 ENCOUNTER — NON-APPOINTMENT (OUTPATIENT)
Age: 61
End: 2021-09-08

## 2021-09-21 ENCOUNTER — OUTPATIENT (OUTPATIENT)
Dept: OUTPATIENT SERVICES | Facility: HOSPITAL | Age: 61
LOS: 1 days | End: 2021-09-21
Payer: COMMERCIAL

## 2021-09-21 DIAGNOSIS — C50.912 MALIGNANT NEOPLASM OF UNSPECIFIED SITE OF LEFT FEMALE BREAST: ICD-10-CM

## 2021-09-21 PROCEDURE — 93356 MYOCRD STRAIN IMG SPCKL TRCK: CPT | Mod: 26

## 2021-09-21 PROCEDURE — 93306 TTE W/DOPPLER COMPLETE: CPT | Mod: 26

## 2021-09-21 PROCEDURE — 93306 TTE W/DOPPLER COMPLETE: CPT

## 2021-09-22 ENCOUNTER — OUTPATIENT (OUTPATIENT)
Dept: OUTPATIENT SERVICES | Facility: HOSPITAL | Age: 61
LOS: 1 days | End: 2021-09-22
Payer: COMMERCIAL

## 2021-09-22 ENCOUNTER — APPOINTMENT (OUTPATIENT)
Age: 61
End: 2021-09-22

## 2021-09-22 ENCOUNTER — APPOINTMENT (OUTPATIENT)
Dept: HEMATOLOGY ONCOLOGY | Facility: CLINIC | Age: 61
End: 2021-09-22
Payer: COMMERCIAL

## 2021-09-22 VITALS
TEMPERATURE: 97 F | SYSTOLIC BLOOD PRESSURE: 154 MMHG | HEART RATE: 82 BPM | OXYGEN SATURATION: 99 % | RESPIRATION RATE: 18 BRPM | DIASTOLIC BLOOD PRESSURE: 84 MMHG

## 2021-09-22 VITALS
RESPIRATION RATE: 18 BRPM | HEART RATE: 110 BPM | OXYGEN SATURATION: 100 % | HEIGHT: 65 IN | TEMPERATURE: 97 F | DIASTOLIC BLOOD PRESSURE: 72 MMHG | SYSTOLIC BLOOD PRESSURE: 175 MMHG | WEIGHT: 154.98 LBS

## 2021-09-22 DIAGNOSIS — C50.919 MALIGNANT NEOPLASM OF UNSPECIFIED SITE OF UNSPECIFIED FEMALE BREAST: ICD-10-CM

## 2021-09-22 LAB
ALBUMIN SERPL ELPH-MCNC: 3.3 G/DL — SIGNIFICANT CHANGE UP (ref 3.3–5)
ALP SERPL-CCNC: 59 U/L — SIGNIFICANT CHANGE UP (ref 40–120)
ALT FLD-CCNC: 23 U/L — SIGNIFICANT CHANGE UP (ref 10–45)
ANION GAP SERPL CALC-SCNC: 5 MMOL/L — SIGNIFICANT CHANGE UP (ref 5–17)
AST SERPL-CCNC: 25 U/L — SIGNIFICANT CHANGE UP (ref 10–40)
BILIRUB SERPL-MCNC: 0.8 MG/DL — SIGNIFICANT CHANGE UP (ref 0.2–1.2)
BUN SERPL-MCNC: 11 MG/DL — SIGNIFICANT CHANGE UP (ref 7–23)
CALCIUM SERPL-MCNC: 9.3 MG/DL — SIGNIFICANT CHANGE UP (ref 8.4–10.5)
CHLORIDE SERPL-SCNC: 104 MMOL/L — SIGNIFICANT CHANGE UP (ref 96–108)
CO2 SERPL-SCNC: 29 MMOL/L — SIGNIFICANT CHANGE UP (ref 22–31)
CREAT SERPL-MCNC: 0.7 MG/DL — SIGNIFICANT CHANGE UP (ref 0.5–1.3)
GLUCOSE SERPL-MCNC: 126 MG/DL — HIGH (ref 70–99)
HCT VFR BLD CALC: 36.7 % — SIGNIFICANT CHANGE UP (ref 34.5–45)
HGB BLD-MCNC: 11.5 G/DL — SIGNIFICANT CHANGE UP (ref 11.5–15.5)
LYMPHOCYTES # BLD AUTO: 1.1 K/UL — SIGNIFICANT CHANGE UP (ref 1–3.3)
LYMPHOCYTES # BLD AUTO: 12.5 % — LOW (ref 13–44)
MCHC RBC-ENTMCNC: 28.9 PG — SIGNIFICANT CHANGE UP (ref 27–34)
MCHC RBC-ENTMCNC: 31.3 GM/DL — LOW (ref 32–36)
MCV RBC AUTO: 92.2 FL — SIGNIFICANT CHANGE UP (ref 80–100)
NEUTROPHILS # BLD AUTO: 7.2 K/UL — SIGNIFICANT CHANGE UP (ref 1.8–7.4)
NEUTROPHILS NFR BLD AUTO: 86.2 % — HIGH (ref 43–77)
PLATELET # BLD AUTO: 183 K/UL — SIGNIFICANT CHANGE UP (ref 150–400)
POTASSIUM SERPL-MCNC: 3.7 MMOL/L — SIGNIFICANT CHANGE UP (ref 3.5–5.3)
POTASSIUM SERPL-SCNC: 3.7 MMOL/L — SIGNIFICANT CHANGE UP (ref 3.5–5.3)
PROT SERPL-MCNC: 6 G/DL — SIGNIFICANT CHANGE UP (ref 6–8.3)
RBC # BLD: 3.98 M/UL — SIGNIFICANT CHANGE UP (ref 3.8–5.2)
RBC # FLD: 15.4 % — HIGH (ref 10.3–14.5)
SODIUM SERPL-SCNC: 138 MMOL/L — SIGNIFICANT CHANGE UP (ref 135–145)
WBC # BLD: 8.4 K/UL — SIGNIFICANT CHANGE UP (ref 3.8–10.5)
WBC # FLD AUTO: 8.4 K/UL — SIGNIFICANT CHANGE UP (ref 3.8–10.5)

## 2021-09-22 PROCEDURE — 36415 COLL VENOUS BLD VENIPUNCTURE: CPT

## 2021-09-22 PROCEDURE — 96413 CHEMO IV INFUSION 1 HR: CPT

## 2021-09-22 PROCEDURE — 85025 COMPLETE CBC W/AUTO DIFF WBC: CPT

## 2021-09-22 PROCEDURE — 96375 TX/PRO/DX INJ NEW DRUG ADDON: CPT

## 2021-09-22 PROCEDURE — 96417 CHEMO IV INFUS EACH ADDL SEQ: CPT

## 2021-09-22 PROCEDURE — 80053 COMPREHEN METABOLIC PANEL: CPT

## 2021-09-22 PROCEDURE — 96367 TX/PROPH/DG ADDL SEQ IV INF: CPT

## 2021-09-22 PROCEDURE — 99215 OFFICE O/P EST HI 40 MIN: CPT

## 2021-09-22 PROCEDURE — 96377 APPLICATON ON-BODY INJECTOR: CPT

## 2021-09-22 RX ORDER — ACETAMINOPHEN 500 MG
650 TABLET ORAL ONCE
Refills: 0 | Status: COMPLETED | OUTPATIENT
Start: 2021-09-22 | End: 2021-09-22

## 2021-09-22 RX ORDER — FUROSEMIDE 40 MG
20 TABLET ORAL ONCE
Refills: 0 | Status: COMPLETED | OUTPATIENT
Start: 2021-09-22 | End: 2021-09-22

## 2021-09-22 RX ORDER — TRASTUZUMAB-DKST 420 MG/20ML
420 INJECTION, POWDER, LYOPHILIZED, FOR SOLUTION INTRAVENOUS ONCE
Refills: 0 | Status: COMPLETED | OUTPATIENT
Start: 2021-09-22 | End: 2021-09-22

## 2021-09-22 RX ORDER — PERTUZUMAB 30 MG/ML
420 INJECTION, SOLUTION, CONCENTRATE INTRAVENOUS ONCE
Refills: 0 | Status: COMPLETED | OUTPATIENT
Start: 2021-09-22 | End: 2021-09-22

## 2021-09-22 RX ORDER — FOSAPREPITANT DIMEGLUMINE 150 MG/5ML
150 INJECTION, POWDER, LYOPHILIZED, FOR SOLUTION INTRAVENOUS ONCE
Refills: 0 | Status: COMPLETED | OUTPATIENT
Start: 2021-09-22 | End: 2021-09-22

## 2021-09-22 RX ORDER — SODIUM CHLORIDE 9 MG/ML
250 INJECTION INTRAMUSCULAR; INTRAVENOUS; SUBCUTANEOUS ONCE
Refills: 0 | Status: COMPLETED | OUTPATIENT
Start: 2021-09-22 | End: 2021-09-22

## 2021-09-22 RX ADMIN — Medication 300 UNIT(S): at 16:15

## 2021-09-22 RX ADMIN — Medication 650 MILLIGRAM(S): at 14:03

## 2021-09-22 RX ADMIN — DOCETAXEL 256.45 MILLIGRAM(S): 20 INJECTION, SOLUTION, CONCENTRATE INTRAVENOUS at 14:10

## 2021-09-22 RX ADMIN — Medication 20 MILLIGRAM(S): at 14:02

## 2021-09-22 RX ADMIN — PERTUZUMAB 528 MILLIGRAM(S): 30 INJECTION, SOLUTION, CONCENTRATE INTRAVENOUS at 12:30

## 2021-09-22 RX ADMIN — FOSAPREPITANT DIMEGLUMINE 310 MILLIGRAM(S): 150 INJECTION, POWDER, LYOPHILIZED, FOR SOLUTION INTRAVENOUS at 13:30

## 2021-09-22 RX ADMIN — Medication 618 MILLIGRAM(S): at 15:10

## 2021-09-22 RX ADMIN — TRASTUZUMAB-DKST 420 MILLIGRAM(S): 420 INJECTION, POWDER, LYOPHILIZED, FOR SOLUTION INTRAVENOUS at 12:57

## 2021-09-22 RX ADMIN — DOCETAXEL 129 MILLIGRAM(S): 20 INJECTION, SOLUTION, CONCENTRATE INTRAVENOUS at 15:40

## 2021-09-22 RX ADMIN — PEGFILGRASTIM-CBQV 6 MILLIGRAM(S): 6 INJECTION, SOLUTION SUBCUTANEOUS at 16:15

## 2021-09-22 RX ADMIN — Medication 12 MILLIGRAM(S): at 14:21

## 2021-09-22 RX ADMIN — Medication 590 MILLIGRAM(S): at 15:40

## 2021-09-22 RX ADMIN — SODIUM CHLORIDE 250 MILLILITER(S): 9 INJECTION INTRAMUSCULAR; INTRAVENOUS; SUBCUTANEOUS at 12:33

## 2021-09-22 RX ADMIN — Medication 212 MILLIGRAM(S): at 14:06

## 2021-09-22 RX ADMIN — Medication 650 MILLIGRAM(S): at 12:33

## 2021-09-22 RX ADMIN — SODIUM CHLORIDE 250 MILLILITER(S): 9 INJECTION INTRAMUSCULAR; INTRAVENOUS; SUBCUTANEOUS at 13:33

## 2021-09-22 RX ADMIN — PERTUZUMAB 420 MILLIGRAM(S): 30 INJECTION, SOLUTION, CONCENTRATE INTRAVENOUS at 13:00

## 2021-09-22 RX ADMIN — PALONOSETRON HYDROCHLORIDE 0.25 MILLIGRAM(S): 0.25 INJECTION, SOLUTION INTRAVENOUS at 14:05

## 2021-09-22 RX ADMIN — FOSAPREPITANT DIMEGLUMINE 150 MILLIGRAM(S): 150 INJECTION, POWDER, LYOPHILIZED, FOR SOLUTION INTRAVENOUS at 14:00

## 2021-09-22 RX ADMIN — TRASTUZUMAB-DKST 540 MILLIGRAM(S): 420 INJECTION, POWDER, LYOPHILIZED, FOR SOLUTION INTRAVENOUS at 12:27

## 2021-09-22 NOTE — HISTORY OF PRESENT ILLNESS
[Disease: _____________________] : Disease: [unfilled] [T: ___] : T[unfilled] [N: ___] : N[unfilled] [de-identified] : 60 year old female presents today for initial consultation of breast cancer, referred by Dr. Shah.  She was sent for diagnostic mammogram for which she felt a left upper quadrant palpalbe mass x 4 months.  \par \par Most recent mammo 5/6/21: showed left 2:00 highly suspicious mass and prominent left axillary lymph node for which an US guided core biopsy was recommended \par \par 5/17/21: left breast US guide core biopsy showed invasive poorly differentiated ductal carcinoma associated with chronic inflammatory infiltrate at the left 12:00 position. The left axillary lymph node biopsy demonstrated a reactive lymph node, negative for carcinoma. These findings are concordant with imaging. ER- NE-, Her 2 amplified FISH. Left axillary LN- reactive on pathology.\par \par \par Risk Factors:\par Age at menarche- 11\par Age at menopause-49\par G6,P6\par Age at first live birth-17\par OCP-denies\par HRT-denies\par Family History-denies\par Genetics-denies\par Smoker-former smoker\par \par 9/22/2021\par Patient cycle 5 TCHP- c/o fatigue, trace of pedal edema. ECHO pending [de-identified] : ER -neg, [place] neg, HER 2 amplified  [de-identified] : Pt presents today for tox check

## 2021-09-22 NOTE — PHYSICAL EXAM
[Fully active, able to carry on all pre-disease performance without restriction] : Status 0 - Fully active, able to carry on all pre-disease performance without restriction [Normal] : affect appropriate [de-identified] : left breast 2 o'clock - 5 cm form nipple non-fixed mass 5  cm - decreased [de-identified] : trace pedal edema

## 2021-09-22 NOTE — ASSESSMENT
[FreeTextEntry1] : 61 yo AA female referred by Dr. Tello Shah for evaluation of newly diagnosed left breast cancer - at least 2.9 cm, \par IDC, ER neg, [place] neg, HER2 patricia IHC equivocal, FISH amplified.  Left axilla LN negative.\par \par Patient accompanied by her . We reviewed the pathobiology of breast cancer, indication for NACT with HER2 targeted treatment.  [facility] \par \par Side effects and schema reviewed with patient. \par \par 6/30/2021\par  cycle 1 TCHP- tx plan and schema reviewed with pt and spouse\par ECHO 6/24/2021- LVEF 55%\par labs stable\par \par 7/14/2021\par Cycle 1, day 14\par Nausea - required meds day 3\par Insomnia\par Pelvic pain \par Diarrhea 3 x per day, loose BM -1-2 per day now\par Tingling sensation in fingers \par Pain meds prescribed - tramadol\par Stressed that if no appetite push liquids, and if no tolerance come IV fluids \par  return in 1 week for tx\par \par 7/21/2021\par TCHP # 2- day 1\par - patient feels better, mild pedal edema.\par - tachycardia- baseline - regular\par - BP - she takes amlodipine 2.5 mg - might contribute to swelling, fluid retention\par - extend dexa 5 mg PO BID x 5 days after treatment to alleviate pain and decrease risk of swelling\par - might benefit from HCTZ, will follow up with PCP\par - reviewed side effects\par \par 8/11/2021\par Patient for cycle 3 TCHP\par felt much better after cycle 2, felt Zyrtec decreased bone pain\par Denies nausea, diarrhea\par Mouth sore resolved, cannot afford Magic Mouthwash- send for viscus lidocaine PRN and will use salt water\par weight back to baseline\par Trace of pedal edema resolved\par \par \par 9/22/2021\par TCHP cycle 5 today\par Concerned about weakness in legs - leg swelling, start diuretics\par if progression of LE weakness will hold docetaxel with cycle 6\par HTN - start HCTZ\par Patient working from home, feels tired. \par She doesn’t want vaccine for COVID. \par Insomnia - will try melatonin\par ECHO -report pending \par \par \par  check cbc and chem, TSH

## 2021-09-22 NOTE — DISCUSSION/SUMMARY
[FreeTextEntry1] : Patient s/p cycle 4 TCHP.\par She c/o fatigue , decreased appetite and weight loss.  She denies SOB but had intermittent leg swelling during week 2 after treatment, that subsequently resolves.  SHe stopped taking amlodipine and has slightly elevated BP. \par She requested a [employer] vaccine medical exemption letter for work. I reviewed with patient that she has no contraindication to vaccine and it is FDA approved.  In addition she has increased risk of COVID complications and should receive COVID vaccine.  \par ECHO ordered.

## 2021-09-30 ENCOUNTER — APPOINTMENT (OUTPATIENT)
Dept: BREAST CENTER | Facility: CLINIC | Age: 61
End: 2021-09-30
Payer: COMMERCIAL

## 2021-09-30 VITALS
OXYGEN SATURATION: 99 % | DIASTOLIC BLOOD PRESSURE: 86 MMHG | SYSTOLIC BLOOD PRESSURE: 158 MMHG | HEART RATE: 106 BPM | HEIGHT: 62 IN

## 2021-09-30 PROCEDURE — 99213 OFFICE O/P EST LOW 20 MIN: CPT

## 2021-09-30 RX ORDER — AMLODIPINE BESYLATE 5 MG/1
TABLET ORAL
Refills: 0 | Status: DISCONTINUED | COMMUNITY
End: 2021-09-30

## 2021-10-05 ENCOUNTER — APPOINTMENT (OUTPATIENT)
Dept: HEMATOLOGY ONCOLOGY | Facility: CLINIC | Age: 61
End: 2021-10-05
Payer: COMMERCIAL

## 2021-10-05 PROCEDURE — 99205 OFFICE O/P NEW HI 60 MIN: CPT | Mod: 95

## 2021-10-05 NOTE — HISTORY OF PRESENT ILLNESS
[FreeTextEntry1] : 61yo female LOV 7/28/21 presents for f/u evaluation of L IDC initially noted as a 2.6cm illdefined mass on mammogram with a 2.9cm sonographic correalte 2:00 5FN. Patient notes that she felt it 4 months ago and it has grown since then. FHx of breast ca Mcousin 40s, and M Aunt 50s. \par \par Patient is currently undergoing treatment with Dr. Fontenot. She completed 5/6 rounds of chemotherapy. She is getting treatment every 3 weeks. She will complete treatment in 2 weeks. She can no longer palpate the tumor.

## 2021-10-05 NOTE — DATA REVIEWED
[FreeTextEntry1] : 5/6/21: Julián DXMG & US ([facility]): heterogeneously dense, L - 2.6cm ill-defined mass in reigon of palpable concern, inferior areas of asymmetry which efface, US - L - 2.9cm ill-defined hypoechoic mass 2:00 5FN, 0.2cm complicated cyst 11:00 6FN. BIRADS 5.\par 5/12/21: L US Guided bx x2 ([facility]): "R shaped clip"  L 2:00 15FN - 1.2cm invasive ductal carcinoma w/ assoc chronic inflammatory infiltrate, poorly differentiated , ER (-), [place] (-), HER2 Equivocal, FISH (+)\par \par L 1.1cm axillary LN 18FN - "S Shaped Clip" reactive LN, negative for carcinoma \par \par 6/18/2021 ([facility]) bilateral breast MRI showing 1. The index malignancy in the 2:00 axis of the left breast measures 3.4 cm x 2.3 cm x 2.4 cm. 2. A 3 mm focus of enhancement in the 5:00 axis of the left breast posterior depth is suspicious for malignancy. MR guided core needle biopsy is recommended.\par 3. Incidentally seen is a 2.4 cm T2 bright lesion in the posterior right hepatic dome. Abdominal ultrasound is recommended. MRI guided biopsy of left 5:00 axis recommended. BIRADS 4\par

## 2021-10-05 NOTE — PHYSICAL EXAM
[Supple] : supple [No Supraclavicular Adenopathy] : no supraclavicular adenopathy [No Cervical Adenopathy] : no cervical adenopathy [Examined in the supine and seated position] : examined in the supine and seated position [No dominant masses] : no dominant masses in right breast  [No Nipple Retraction] : no left nipple retraction [No Nipple Discharge] : no left nipple discharge [No Axillary Lymphadenopathy] : no left axillary lymphadenopathy [de-identified] : resolution of palpable mass at 2:00 5FN at site of bx proven ca

## 2021-10-05 NOTE — ASSESSMENT
[FreeTextEntry1] : . 61yo female LOV 7/28/21 presents for f/u evaluation of LEFT HER-2 positive IDC initially noted as a 2.6cm illdefined mass on mammogram with a 2.9cm sonographic correalte 2:00 5FN. Patient notes that she felt it 4 months ago and it has grown since then. FHx of breast ca Mcousin 40s, and M Aunt 50s. \par \par Patient is currently undergoing treatment with Dr. Fontenot. She completed 5/6 rounds of chemotherapy. She is getting treatment every 3 weeks. She will complete treatment in 2 weeks. She can no longer palpate the tumor and exam confirms no palpable mass.

## 2021-10-12 ENCOUNTER — NON-APPOINTMENT (OUTPATIENT)
Age: 61
End: 2021-10-12

## 2021-10-12 ENCOUNTER — APPOINTMENT (OUTPATIENT)
Dept: HEMATOLOGY ONCOLOGY | Facility: CLINIC | Age: 61
End: 2021-10-12
Payer: COMMERCIAL

## 2021-10-12 PROCEDURE — 99215 OFFICE O/P EST HI 40 MIN: CPT | Mod: 95

## 2021-10-13 ENCOUNTER — LABORATORY RESULT (OUTPATIENT)
Age: 61
End: 2021-10-13

## 2021-10-13 ENCOUNTER — APPOINTMENT (OUTPATIENT)
Age: 61
End: 2021-10-13

## 2021-10-13 ENCOUNTER — APPOINTMENT (OUTPATIENT)
Dept: HEMATOLOGY ONCOLOGY | Facility: CLINIC | Age: 61
End: 2021-10-13
Payer: COMMERCIAL

## 2021-10-13 ENCOUNTER — OUTPATIENT (OUTPATIENT)
Dept: OUTPATIENT SERVICES | Facility: HOSPITAL | Age: 61
LOS: 1 days | End: 2021-10-13
Payer: COMMERCIAL

## 2021-10-13 VITALS
RESPIRATION RATE: 17 BRPM | WEIGHT: 154.98 LBS | TEMPERATURE: 97 F | DIASTOLIC BLOOD PRESSURE: 78 MMHG | OXYGEN SATURATION: 98 % | SYSTOLIC BLOOD PRESSURE: 161 MMHG | HEIGHT: 65 IN | HEART RATE: 116 BPM

## 2021-10-13 VITALS
TEMPERATURE: 98 F | SYSTOLIC BLOOD PRESSURE: 118 MMHG | DIASTOLIC BLOOD PRESSURE: 69 MMHG | RESPIRATION RATE: 18 BRPM | OXYGEN SATURATION: 97 % | HEART RATE: 90 BPM

## 2021-10-13 DIAGNOSIS — C50.919 MALIGNANT NEOPLASM OF UNSPECIFIED SITE OF UNSPECIFIED FEMALE BREAST: ICD-10-CM

## 2021-10-13 LAB
ALBUMIN SERPL ELPH-MCNC: 3.2 G/DL — LOW (ref 3.3–5)
ALP SERPL-CCNC: 65 U/L — SIGNIFICANT CHANGE UP (ref 40–120)
ALT FLD-CCNC: 26 U/L — SIGNIFICANT CHANGE UP (ref 10–45)
ANION GAP SERPL CALC-SCNC: 9 MMOL/L — SIGNIFICANT CHANGE UP (ref 5–17)
AST SERPL-CCNC: 25 U/L — SIGNIFICANT CHANGE UP (ref 10–40)
BILIRUB SERPL-MCNC: 0.8 MG/DL — SIGNIFICANT CHANGE UP (ref 0.2–1.2)
BUN SERPL-MCNC: 10 MG/DL — SIGNIFICANT CHANGE UP (ref 7–23)
CALCIUM SERPL-MCNC: 9.4 MG/DL — SIGNIFICANT CHANGE UP (ref 8.4–10.5)
CHLORIDE SERPL-SCNC: 100 MMOL/L — SIGNIFICANT CHANGE UP (ref 96–108)
CO2 SERPL-SCNC: 30 MMOL/L — SIGNIFICANT CHANGE UP (ref 22–31)
CREAT SERPL-MCNC: 0.6 MG/DL — SIGNIFICANT CHANGE UP (ref 0.5–1.3)
GLUCOSE SERPL-MCNC: 132 MG/DL — HIGH (ref 70–99)
HCT VFR BLD CALC: 36.6 % — SIGNIFICANT CHANGE UP (ref 34.5–45)
HGB BLD-MCNC: 11.5 G/DL — SIGNIFICANT CHANGE UP (ref 11.5–15.5)
LYMPHOCYTES # BLD AUTO: 0.8 K/UL — LOW (ref 1–3.3)
LYMPHOCYTES # BLD AUTO: 11.1 % — LOW (ref 13–44)
MCHC RBC-ENTMCNC: 29.3 PG — SIGNIFICANT CHANGE UP (ref 27–34)
MCHC RBC-ENTMCNC: 31.4 GM/DL — LOW (ref 32–36)
MCV RBC AUTO: 93.4 FL — SIGNIFICANT CHANGE UP (ref 80–100)
NEUTROPHILS # BLD AUTO: 6.7 K/UL — SIGNIFICANT CHANGE UP (ref 1.8–7.4)
NEUTROPHILS NFR BLD AUTO: 87.6 % — HIGH (ref 43–77)
PLATELET # BLD AUTO: 173 K/UL — SIGNIFICANT CHANGE UP (ref 150–400)
POTASSIUM SERPL-MCNC: 3.6 MMOL/L — SIGNIFICANT CHANGE UP (ref 3.5–5.3)
POTASSIUM SERPL-SCNC: 3.6 MMOL/L — SIGNIFICANT CHANGE UP (ref 3.5–5.3)
PROT SERPL-MCNC: 6 G/DL — SIGNIFICANT CHANGE UP (ref 6–8.3)
RBC # BLD: 3.92 M/UL — SIGNIFICANT CHANGE UP (ref 3.8–5.2)
RBC # FLD: 15.1 % — HIGH (ref 10.3–14.5)
SODIUM SERPL-SCNC: 139 MMOL/L — SIGNIFICANT CHANGE UP (ref 135–145)
WBC # BLD: 7.6 K/UL — SIGNIFICANT CHANGE UP (ref 3.8–10.5)
WBC # FLD AUTO: 7.6 K/UL — SIGNIFICANT CHANGE UP (ref 3.8–10.5)

## 2021-10-13 PROCEDURE — 36415 COLL VENOUS BLD VENIPUNCTURE: CPT

## 2021-10-13 PROCEDURE — 99215 OFFICE O/P EST HI 40 MIN: CPT | Mod: 25

## 2021-10-13 PROCEDURE — 85025 COMPLETE CBC W/AUTO DIFF WBC: CPT

## 2021-10-13 PROCEDURE — 96361 HYDRATE IV INFUSION ADD-ON: CPT

## 2021-10-13 PROCEDURE — 96367 TX/PROPH/DG ADDL SEQ IV INF: CPT

## 2021-10-13 PROCEDURE — 80053 COMPREHEN METABOLIC PANEL: CPT

## 2021-10-13 PROCEDURE — 96417 CHEMO IV INFUS EACH ADDL SEQ: CPT

## 2021-10-13 PROCEDURE — 96413 CHEMO IV INFUSION 1 HR: CPT

## 2021-10-13 PROCEDURE — 96372 THER/PROPH/DIAG INJ SC/IM: CPT

## 2021-10-13 RX ORDER — PERTUZUMAB 30 MG/ML
420 INJECTION, SOLUTION, CONCENTRATE INTRAVENOUS ONCE
Refills: 0 | Status: COMPLETED | OUTPATIENT
Start: 2021-10-13 | End: 2021-10-13

## 2021-10-13 RX ORDER — DOCETAXEL 20 MG/ML
129 INJECTION, SOLUTION, CONCENTRATE INTRAVENOUS ONCE
Refills: 0 | Status: DISCONTINUED | OUTPATIENT
Start: 2021-10-13 | End: 2021-10-13

## 2021-10-13 RX ORDER — PEGFILGRASTIM-CBQV 6 MG/.6ML
6 INJECTION, SOLUTION SUBCUTANEOUS ONCE
Refills: 0 | Status: DISCONTINUED | OUTPATIENT
Start: 2021-10-13 | End: 2021-10-13

## 2021-10-13 RX ORDER — SODIUM CHLORIDE 9 MG/ML
250 INJECTION INTRAMUSCULAR; INTRAVENOUS; SUBCUTANEOUS ONCE
Refills: 0 | Status: COMPLETED | OUTPATIENT
Start: 2021-10-13 | End: 2021-10-13

## 2021-10-13 RX ORDER — DEXAMETHASONE 0.5 MG/5ML
12 ELIXIR ORAL ONCE
Refills: 0 | Status: COMPLETED | OUTPATIENT
Start: 2021-10-13 | End: 2021-10-13

## 2021-10-13 RX ORDER — FOSAPREPITANT DIMEGLUMINE 150 MG/5ML
150 INJECTION, POWDER, LYOPHILIZED, FOR SOLUTION INTRAVENOUS ONCE
Refills: 0 | Status: COMPLETED | OUTPATIENT
Start: 2021-10-13 | End: 2021-10-13

## 2021-10-13 RX ORDER — TRASTUZUMAB-DKST 420 MG/20ML
420 INJECTION, POWDER, LYOPHILIZED, FOR SOLUTION INTRAVENOUS ONCE
Refills: 0 | Status: COMPLETED | OUTPATIENT
Start: 2021-10-13 | End: 2021-10-13

## 2021-10-13 RX ADMIN — Medication 212 MILLIGRAM(S): at 13:15

## 2021-10-13 RX ADMIN — PERTUZUMAB 528 MILLIGRAM(S): 30 INJECTION, SOLUTION, CONCENTRATE INTRAVENOUS at 11:41

## 2021-10-13 RX ADMIN — Medication 618 MILLIGRAM(S): at 14:05

## 2021-10-13 RX ADMIN — PALONOSETRON HYDROCHLORIDE 0.25 MILLIGRAM(S): 0.25 INJECTION, SOLUTION INTRAVENOUS at 14:03

## 2021-10-13 RX ADMIN — PERTUZUMAB 420 MILLIGRAM(S): 30 INJECTION, SOLUTION, CONCENTRATE INTRAVENOUS at 12:15

## 2021-10-13 RX ADMIN — Medication 590 MILLIGRAM(S): at 14:45

## 2021-10-13 RX ADMIN — SODIUM CHLORIDE 250 MILLILITER(S): 9 INJECTION INTRAMUSCULAR; INTRAVENOUS; SUBCUTANEOUS at 12:15

## 2021-10-13 RX ADMIN — FOSAPREPITANT DIMEGLUMINE 310 MILLIGRAM(S): 150 INJECTION, POWDER, LYOPHILIZED, FOR SOLUTION INTRAVENOUS at 13:10

## 2021-10-13 RX ADMIN — TRASTUZUMAB-DKST 540 MILLIGRAM(S): 420 INJECTION, POWDER, LYOPHILIZED, FOR SOLUTION INTRAVENOUS at 12:10

## 2021-10-13 RX ADMIN — SODIUM CHLORIDE 250 MILLILITER(S): 9 INJECTION INTRAMUSCULAR; INTRAVENOUS; SUBCUTANEOUS at 11:00

## 2021-10-18 NOTE — PHYSICAL EXAM
[Fully active, able to carry on all pre-disease performance without restriction] : Status 0 - Fully active, able to carry on all pre-disease performance without restriction [Normal] : affect appropriate [de-identified] : left breast 2 o'clock - 5 cm form nipple non-fixed mass 5  cm - decreased [de-identified] : trace pedal edema

## 2021-10-18 NOTE — ASSESSMENT
[FreeTextEntry1] : 59 yo AA female referred by Dr. Tello Shah for evaluation of newly diagnosed left breast cancer - at least 2.9 cm, \par IDC, ER neg, [place] neg, HER2 patricia IHC equivocal, FISH amplified.  Left axilla LN negative.\par \par Patient accompanied by her . We reviewed the pathobiology of breast cancer, indication for NACT with HER2 targeted treatment.  Patient offered TCHP based on the [facility] protocol. \par \par Side effects and schema reviewed with patient. \par \par 6/30/2021\par  cycle 1 TCHP- tx plan and schema reviewed with pt and spouse\par ECHO 6/24/2021- LVEF 55%\par labs stable\par \par 7/14/2021\par Cycle 1, day 14\par Nausea - required meds day 3\par Insomnia\par Pelvic pain \par Diarrhea 3 x per day, loose BM -1-2 per day now\par Tingling sensation in fingers \par Pain meds prescribed - tramadol\par Stressed that if no appetite push liquids, and if no tolerance come IV fluids \par  return in 1 week for tx\par \par 7/21/2021\par TCHP # 2- day 1\par - patient feels better, mild pedal edema.\par - tachycardia- baseline - regular\par - BP - she takes amlodipine 2.5 mg - might contribute to swelling, fluid retention\par - extend dexa 5 mg PO BID x 5 days after treatment to alleviate pain and decrease risk of swelling\par - might benefit from HCTZ, will follow up with PCP\par - reviewed side effects\par \par 8/11/2021\par Patient for cycle 3 TCHP\par felt much better after cycle 2, felt Zyrtec decreased bone pain\par Denies nausea, diarrhea\par Mouth sore resolved, cannot afford Magic Mouthwash- send for viscus lidocaine PRN and will use salt water\par weight back to baseline\par Trace of pedal edema resolved\par \par 9/22/2021\par TCHP cycle 5 today\par Concerned about weakness in legs - leg swelling, start diuretics\par if progression of LE weakness will hold docetaxel with cycle 6\par HTN - start HCTZ\par Patient working from home, feels tired. \par She doesn’t want vaccine for COVID. \par Insomnia - will try melatonin\par ECHO -report pending \par \par 10/13/2021\par IDC T2N0 on neoadjuvant chemotherapy\par Excellent clinical response\par Patient for TCHP  6/6, \par She is happy she is finishing chemotherapy part.\par C/o weakness in the legs and neuropathy - referred to PT and hold Docetaxel last dose\par Explained plan for MRI, surgical follow up with Dr. Shah \par \par Plan to continue Trastuzumab and pertuzumab q 3 weeks until surgery.\par \par Following surgery if there is complete pathological response patient will continue trastuzumab and pertuzumab every 3 weeks for total of 17 cycles ( including neoadjuvant treatment) .\par If there is no pCR patient will be changed to TDM-1 for 14 cycles\par \par  check cbc and chem, TSH

## 2021-10-18 NOTE — HISTORY OF PRESENT ILLNESS
[Disease: _____________________] : Disease: [unfilled] [T: ___] : T[unfilled] [N: ___] : N[unfilled] [de-identified] : 60 year old female presents today for initial consultation of breast cancer, referred by Dr. Sahh.  She was sent for diagnostic mammogram for which she felt a left upper quadrant palpalbe mass x 4 months.  \par \par Most recent mammo 5/6/21: showed left 2:00 highly suspicious mass and prominent left axillary lymph node for which an US guided core biopsy was recommended \par \par 5/17/21: left breast US guide core biopsy showed invasive poorly differentiated ductal carcinoma associated with chronic inflammatory infiltrate at the left 12:00 position. The left axillary lymph node biopsy demonstrated a reactive lymph node, negative for carcinoma. These findings are concordant with imaging. ER- [place]-, Her 2 amplified FISH. Left axillary LN- reactive on pathology.\par \par \par Risk Factors:\par Age at menarche- 11\par Age at menopause-49\par G6,P6\par Age at first live birth-17\par OCP-denies\par HRT-denies\par Family History-denies\par Genetics-denies\par Smoker-former smoker\par \par 9/22/2021\par Patient cycle 5 TCHP- c/o fatigue, trace of pedal edema. ECHO pending [de-identified] : ER -neg, [place] neg, HER 2 amplified  [de-identified] : Pt presents today for tox check

## 2021-10-19 ENCOUNTER — APPOINTMENT (OUTPATIENT)
Dept: BREAST CENTER | Facility: CLINIC | Age: 61
End: 2021-10-19
Payer: COMMERCIAL

## 2021-10-19 VITALS
SYSTOLIC BLOOD PRESSURE: 125 MMHG | WEIGHT: 145 LBS | BODY MASS INDEX: 26.68 KG/M2 | HEIGHT: 62 IN | DIASTOLIC BLOOD PRESSURE: 73 MMHG | OXYGEN SATURATION: 100 % | HEART RATE: 110 BPM

## 2021-10-19 DIAGNOSIS — Z87.891 PERSONAL HISTORY OF NICOTINE DEPENDENCE: ICD-10-CM

## 2021-10-19 PROCEDURE — 99213 OFFICE O/P EST LOW 20 MIN: CPT

## 2021-10-24 NOTE — DATA REVIEWED
[FreeTextEntry1] : 5/6/21: Julián DXMG & US ([facility]): heterogeneously dense, L - 2.6cm ill-defined mass in reigon of palpable concern, inferior areas of asymmetry which efface, US - L - 2.9cm ill-defined hypoechoic mass 2:00 5FN, 0.2cm complicated cyst 11:00 6FN. BIRADS 5.\par 5/12/21: L US Guided bx x2 ([facility]): "R shaped clip"  L 2:00 15FN - 1.2cm invasive ductal carcinoma w/ assoc chronic inflammatory infiltrate, poorly differentiated , ER (-), [place] (-), HER2 Equivocal, FISH (+)\par \par L 1.1cm axillary LN 18FN - "S Shaped Clip" reactive LN, negative for carcinoma \par \par 6/18/2021 ([facility]) bilateral breast MRI showing 1. The index malignancy in the 2:00 axis of the left breast measures 3.4 cm x 2.3 cm x 2.4 cm. 2. A 3 mm focus of enhancement in the 5:00 axis of the left breast posterior depth is suspicious for malignancy. MR guided core needle biopsy is recommended.\par 3. Incidentally seen is a 2.4 cm T2 bright lesion in the posterior right hepatic dome. Abdominal ultrasound is recommended. MRI guided biopsy of left 5:00 axis recommended. BIRADS 4\par \par 10/19/2021 ([facility]) bilateral breast MRI 1. Post neoadjuvant chemotherapy, significant decreased in size of index lesion, left breast 2:00 axis, residual foci of enhancement measuring in total 1.3 x 0.8 cm surrounding the biopsy clip. 2. Interval resolution of the left breast 5:00 axis suspicious focus of enhancement. 3. T2 hyperintense lesion in the right hepatic dome, likely hepatic cyst. No change.\par 4. No MRI evidence of right breast malignancy.

## 2021-10-24 NOTE — HISTORY OF PRESENT ILLNESS
[FreeTextEntry1] : 59 yo female presents for follow up evaluation of left 2:00 5cmFN IDC ER 0%, [place] 0%, HER-2 equivocal, FISH positive, initially noted as a 2.6cm ill defined mass on mammogram with a 2.9cm sonographic correlate. Patient notes that she felt it approximately 4 months previous to her imaging and it has grown since then. FHx of breast ca Mcousin 40s, and M Aunt 50s. She met with a genetic counselor, but declined genetic testing. \par \par Patient is currently undergoing treatment with Dr. Fontenot. She completed 6/6 rounds of TCHP on 10/13/2021.  She can no longer palpate the tumor. The plan will be to continue Herceptin and Perjeta infusions every 3 weeks until surgery. \par \par She completed her post-neoadjuvant breast MRI showing a significant decrease in the size of the index lesion with residual foci of enhancement measuring 1.3 x 0.8cm. Interval resolution of left breast 5:00 axis suspicious focus of enhancement. \par \par Of note, she has a history of smoking, quit < 1 year ago, approximately 8 months ago. \par \par

## 2021-10-24 NOTE — ASSESSMENT
[FreeTextEntry1] : 61 yo female presents for follow up evaluation of left 2:00 5cmFN IDC initially noted as a 2.6cm ill defined mass on mammogram with a 2.9cm sonographic correlate. Patient notes that she felt it approximately 4 months previous to her imaging and it has grown since then. FHx of breast ca Mcousin 40s, and M Aunt 50s. \par \par Patient is currently undergoing treatment with Dr. Fontenot. She completed 6/6 rounds of TCHP on 10/13/2021.  She can no longer palpate the tumor. The plan will be to continue Herceptin and Perjeta infusions every 3 weeks until surgery. \par \par Post-neoadjuvant breast MRI showing a significant decrease in the size of the index lesion with residual foci of enhancement measuring 1.3 x 0.8cm. Interval resolution of left breast 5:00 axis suspicious focus of enhancement. \par \par Reviewed and discussed pathology and imaging results. \par \par The use of multi-modal therapy for the treatment of breast cancer was discussed. \par Surgical options were reviewed including a lumpectomy to negative margins, with whole breast radiation therapy.\par \par Discussed axillary staging, reviewing the sentinel lymph node procedure involving a radioactive isotope and blue dye. Discussed if the sentinel lymph node is found to have metastatic disease either on the intraoperative evaluation or on the final pathology, given the clinical picture, an axillary dissection of the remaining lymph nodes may be recommended.\par \par Touched upon general indications for the use of adjuvant hormonal and targeted therapies. It was explained that medical treatments are ultimately dependent on the final surgical pathology results. Indications for adjuvant radiation therapy were discussed including the potential need for post-operative whole breast radiation, which on average ranges from 5 weekly sessions for 3-6 weeks. \par \par All questions were answered and written information was provided. Pre-operative paperwork and education was provided. Surgical consent was obtained. \par \par Discussed the indications to begin physical therapy pre-operatively given she is now ambulating with the assistance of a cane secondary to weakness from chemotherapy.  The patient verbalized understanding and is in agreement with the plan. Nurse Navigator was notified and will be arranging the evaluation on behalf of the patient.  \par \par Sozo measurements obtained today.

## 2021-10-24 NOTE — REASON FOR VISIT
[Follow-Up: _____] : a [unfilled] follow-up visit [FreeTextEntry1] :  left 2:00 5cmFN IDC ER 0%, [place] 0%, HER-2 equivocal, FISH positive

## 2021-10-24 NOTE — PHYSICAL EXAM
[Supple] : supple [No Supraclavicular Adenopathy] : no supraclavicular adenopathy [No Cervical Adenopathy] : no cervical adenopathy [Examined in the supine and seated position] : examined in the supine and seated position [No dominant masses] : no dominant masses in right breast  [No Nipple Retraction] : no left nipple retraction [No Nipple Discharge] : no left nipple discharge [No Axillary Lymphadenopathy] : no left axillary lymphadenopathy [de-identified] : resolution of palpable mass at 2:00 5FN at site of bx proven ca

## 2021-10-24 NOTE — PAST MEDICAL HISTORY
[Menarche Age ____] : age at menarche was [unfilled] [Menopause Age____] : age at menopause was [unfilled] [Total Preg ___] : G[unfilled] [Live Births ___] : P[unfilled]  [Abortions ___] : Abortions:[unfilled] [FreeTextEntry5] : n/a [FreeTextEntry6] : n/a [FreeTextEntry8] : yes [FreeTextEntry7] : n/a

## 2021-10-27 ENCOUNTER — APPOINTMENT (OUTPATIENT)
Dept: HEMATOLOGY ONCOLOGY | Facility: CLINIC | Age: 61
End: 2021-10-27

## 2021-11-03 ENCOUNTER — APPOINTMENT (OUTPATIENT)
Age: 61
End: 2021-11-03

## 2021-11-05 ENCOUNTER — APPOINTMENT (OUTPATIENT)
Age: 61
End: 2021-11-05

## 2021-11-05 ENCOUNTER — OUTPATIENT (OUTPATIENT)
Dept: OUTPATIENT SERVICES | Facility: HOSPITAL | Age: 61
LOS: 1 days | End: 2021-11-05
Payer: COMMERCIAL

## 2021-11-05 VITALS
DIASTOLIC BLOOD PRESSURE: 72 MMHG | OXYGEN SATURATION: 97 % | TEMPERATURE: 98 F | SYSTOLIC BLOOD PRESSURE: 123 MMHG | RESPIRATION RATE: 18 BRPM | WEIGHT: 145.06 LBS | HEIGHT: 65 IN | HEART RATE: 86 BPM

## 2021-11-05 VITALS
SYSTOLIC BLOOD PRESSURE: 110 MMHG | DIASTOLIC BLOOD PRESSURE: 67 MMHG | TEMPERATURE: 98 F | OXYGEN SATURATION: 97 % | HEART RATE: 88 BPM | RESPIRATION RATE: 18 BRPM

## 2021-11-05 LAB
ALBUMIN SERPL ELPH-MCNC: 3.4 G/DL — SIGNIFICANT CHANGE UP (ref 3.3–5)
ALP SERPL-CCNC: 54 U/L — SIGNIFICANT CHANGE UP (ref 40–120)
ALT FLD-CCNC: 25 U/L — SIGNIFICANT CHANGE UP (ref 10–45)
ANION GAP SERPL CALC-SCNC: 1 MMOL/L — LOW (ref 5–17)
AST SERPL-CCNC: 28 U/L — SIGNIFICANT CHANGE UP (ref 10–40)
BASOPHILS # BLD AUTO: 0.01 K/UL — SIGNIFICANT CHANGE UP (ref 0–0.2)
BASOPHILS NFR BLD AUTO: 0.3 % — SIGNIFICANT CHANGE UP (ref 0–2)
BILIRUB SERPL-MCNC: 0.9 MG/DL — SIGNIFICANT CHANGE UP (ref 0.2–1.2)
BUN SERPL-MCNC: 11 MG/DL — SIGNIFICANT CHANGE UP (ref 7–23)
CALCIUM SERPL-MCNC: 9.4 MG/DL — SIGNIFICANT CHANGE UP (ref 8.4–10.5)
CHLORIDE SERPL-SCNC: 106 MMOL/L — SIGNIFICANT CHANGE UP (ref 96–108)
CO2 SERPL-SCNC: 31 MMOL/L — SIGNIFICANT CHANGE UP (ref 22–31)
CREAT SERPL-MCNC: 0.6 MG/DL — SIGNIFICANT CHANGE UP (ref 0.5–1.3)
CRP SERPL-MCNC: <3 MG/L — SIGNIFICANT CHANGE UP (ref 0–4)
EOSINOPHIL # BLD AUTO: 0.01 K/UL — SIGNIFICANT CHANGE UP (ref 0–0.5)
EOSINOPHIL NFR BLD AUTO: 0.3 % — SIGNIFICANT CHANGE UP (ref 0–6)
ERYTHROCYTE [SEDIMENTATION RATE] IN BLOOD: 21 MM/HR — SIGNIFICANT CHANGE UP
GLUCOSE SERPL-MCNC: 105 MG/DL — HIGH (ref 70–99)
HCT VFR BLD CALC: 33.1 % — LOW (ref 34.5–45)
HGB BLD-MCNC: 10.2 G/DL — LOW (ref 11.5–15.5)
IMM GRANULOCYTES NFR BLD AUTO: 0 % — SIGNIFICANT CHANGE UP (ref 0–1.5)
LDH SERPL L TO P-CCNC: 197 U/L — SIGNIFICANT CHANGE UP (ref 50–242)
LYMPHOCYTES # BLD AUTO: 1.57 K/UL — SIGNIFICANT CHANGE UP (ref 1–3.3)
LYMPHOCYTES # BLD AUTO: 52.2 % — HIGH (ref 13–44)
MAGNESIUM SERPL-MCNC: 1.5 MG/DL — LOW (ref 1.6–2.6)
MCHC RBC-ENTMCNC: 29.1 PG — SIGNIFICANT CHANGE UP (ref 27–34)
MCHC RBC-ENTMCNC: 30.8 GM/DL — LOW (ref 32–36)
MCV RBC AUTO: 94.6 FL — SIGNIFICANT CHANGE UP (ref 80–100)
MONOCYTES # BLD AUTO: 0.18 K/UL — SIGNIFICANT CHANGE UP (ref 0–0.9)
MONOCYTES NFR BLD AUTO: 6 % — SIGNIFICANT CHANGE UP (ref 2–14)
NEUTROPHILS # BLD AUTO: 1.24 K/UL — LOW (ref 1.8–7.4)
NEUTROPHILS NFR BLD AUTO: 41.2 % — LOW (ref 43–77)
NRBC # BLD: 0 /100 WBCS — SIGNIFICANT CHANGE UP (ref 0–0)
PHOSPHATE SERPL-MCNC: 3.2 MG/DL — SIGNIFICANT CHANGE UP (ref 2.5–4.5)
PLATELET # BLD AUTO: 107 K/UL — LOW (ref 150–400)
POTASSIUM SERPL-MCNC: 3.6 MMOL/L — SIGNIFICANT CHANGE UP (ref 3.5–5.3)
POTASSIUM SERPL-SCNC: 3.6 MMOL/L — SIGNIFICANT CHANGE UP (ref 3.5–5.3)
PROT SERPL-MCNC: 6 G/DL — SIGNIFICANT CHANGE UP (ref 6–8.3)
RBC # BLD: 3.5 M/UL — LOW (ref 3.8–5.2)
RBC # FLD: 14.2 % — SIGNIFICANT CHANGE UP (ref 10.3–14.5)
SODIUM SERPL-SCNC: 138 MMOL/L — SIGNIFICANT CHANGE UP (ref 135–145)
URATE SERPL-MCNC: 4.9 MG/DL — SIGNIFICANT CHANGE UP (ref 2.5–7)
WBC # BLD: 3.01 K/UL — LOW (ref 3.8–10.5)
WBC # FLD AUTO: 3.01 K/UL — LOW (ref 3.8–10.5)

## 2021-11-05 PROCEDURE — 36415 COLL VENOUS BLD VENIPUNCTURE: CPT

## 2021-11-05 PROCEDURE — 85025 COMPLETE CBC W/AUTO DIFF WBC: CPT

## 2021-11-05 PROCEDURE — 80053 COMPREHEN METABOLIC PANEL: CPT

## 2021-11-05 PROCEDURE — 86140 C-REACTIVE PROTEIN: CPT

## 2021-11-05 PROCEDURE — 96413 CHEMO IV INFUSION 1 HR: CPT

## 2021-11-05 PROCEDURE — 96417 CHEMO IV INFUS EACH ADDL SEQ: CPT

## 2021-11-05 PROCEDURE — 83615 LACTATE (LD) (LDH) ENZYME: CPT

## 2021-11-05 PROCEDURE — 83735 ASSAY OF MAGNESIUM: CPT

## 2021-11-05 PROCEDURE — 84550 ASSAY OF BLOOD/URIC ACID: CPT

## 2021-11-05 PROCEDURE — 85652 RBC SED RATE AUTOMATED: CPT

## 2021-11-05 PROCEDURE — 84100 ASSAY OF PHOSPHORUS: CPT

## 2021-11-05 RX ORDER — TRASTUZUMAB-DKST 420 MG/20ML
420 INJECTION, POWDER, LYOPHILIZED, FOR SOLUTION INTRAVENOUS ONCE
Refills: 0 | Status: COMPLETED | OUTPATIENT
Start: 2021-11-05 | End: 2021-11-05

## 2021-11-05 RX ORDER — PERTUZUMAB 30 MG/ML
420 INJECTION, SOLUTION, CONCENTRATE INTRAVENOUS ONCE
Refills: 0 | Status: COMPLETED | OUTPATIENT
Start: 2021-11-05 | End: 2021-11-05

## 2021-11-05 RX ADMIN — PERTUZUMAB 528 MILLIGRAM(S): 30 INJECTION, SOLUTION, CONCENTRATE INTRAVENOUS at 11:32

## 2021-11-05 RX ADMIN — TRASTUZUMAB-DKST 420 MILLIGRAM(S): 420 INJECTION, POWDER, LYOPHILIZED, FOR SOLUTION INTRAVENOUS at 11:31

## 2021-11-05 RX ADMIN — TRASTUZUMAB-DKST 540 MILLIGRAM(S): 420 INJECTION, POWDER, LYOPHILIZED, FOR SOLUTION INTRAVENOUS at 11:01

## 2021-11-05 RX ADMIN — PERTUZUMAB 420 MILLIGRAM(S): 30 INJECTION, SOLUTION, CONCENTRATE INTRAVENOUS at 12:03

## 2021-11-08 DIAGNOSIS — C50.919 MALIGNANT NEOPLASM OF UNSPECIFIED SITE OF UNSPECIFIED FEMALE BREAST: ICD-10-CM

## 2021-11-12 ENCOUNTER — RESULT REVIEW (OUTPATIENT)
Age: 61
End: 2021-11-12

## 2021-11-12 ENCOUNTER — APPOINTMENT (OUTPATIENT)
Dept: MAMMOGRAPHY | Facility: HOSPITAL | Age: 61
End: 2021-11-12
Payer: COMMERCIAL

## 2021-11-12 ENCOUNTER — OUTPATIENT (OUTPATIENT)
Dept: OUTPATIENT SERVICES | Facility: HOSPITAL | Age: 61
LOS: 1 days | End: 2021-11-12
Payer: COMMERCIAL

## 2021-11-12 DIAGNOSIS — Z01.818 ENCOUNTER FOR OTHER PREPROCEDURAL EXAMINATION: ICD-10-CM

## 2021-11-12 PROCEDURE — 19281 PERQ DEVICE BREAST 1ST IMAG: CPT

## 2021-11-12 PROCEDURE — C1889: CPT

## 2021-11-12 PROCEDURE — 19281 PERQ DEVICE BREAST 1ST IMAG: CPT | Mod: LT

## 2021-11-16 ENCOUNTER — APPOINTMENT (OUTPATIENT)
Dept: DISASTER EMERGENCY | Facility: CLINIC | Age: 61
End: 2021-11-16

## 2021-11-17 LAB — SARS-COV-2 N GENE NPH QL NAA+PROBE: NOT DETECTED

## 2021-11-18 ENCOUNTER — RESULT REVIEW (OUTPATIENT)
Age: 61
End: 2021-11-18

## 2021-11-18 ENCOUNTER — OUTPATIENT (OUTPATIENT)
Dept: OUTPATIENT SERVICES | Facility: HOSPITAL | Age: 61
LOS: 1 days | End: 2021-11-18
Payer: COMMERCIAL

## 2021-11-18 ENCOUNTER — TRANSCRIPTION ENCOUNTER (OUTPATIENT)
Age: 61
End: 2021-11-18

## 2021-11-18 PROCEDURE — 78195 LYMPH SYSTEM IMAGING: CPT | Mod: 26

## 2021-11-18 PROCEDURE — 78195 LYMPH SYSTEM IMAGING: CPT

## 2021-11-18 PROCEDURE — A9541: CPT

## 2021-11-19 ENCOUNTER — OUTPATIENT (OUTPATIENT)
Dept: OUTPATIENT SERVICES | Facility: HOSPITAL | Age: 61
LOS: 1 days | Discharge: HOME | End: 2021-11-19
Payer: COMMERCIAL

## 2021-11-19 ENCOUNTER — RESULT REVIEW (OUTPATIENT)
Age: 61
End: 2021-11-19

## 2021-11-19 ENCOUNTER — APPOINTMENT (OUTPATIENT)
Dept: BREAST CENTER | Facility: AMBULATORY SURGERY CENTER | Age: 61
End: 2021-11-19

## 2021-11-19 PROCEDURE — 88305 TISSUE EXAM BY PATHOLOGIST: CPT | Mod: 26

## 2021-11-19 PROCEDURE — 88307 TISSUE EXAM BY PATHOLOGIST: CPT | Mod: 26

## 2021-11-19 PROCEDURE — 38525 BIOPSY/REMOVAL LYMPH NODES: CPT | Mod: LT

## 2021-11-19 PROCEDURE — 38900 IO MAP OF SENT LYMPH NODE: CPT | Mod: LT

## 2021-11-19 PROCEDURE — 76098 X-RAY EXAM SURGICAL SPECIMEN: CPT | Mod: 26

## 2021-11-19 PROCEDURE — 19301 PARTIAL MASTECTOMY: CPT | Mod: LT

## 2021-11-19 PROCEDURE — 88360 TUMOR IMMUNOHISTOCHEM/MANUAL: CPT | Mod: 26

## 2021-11-22 ENCOUNTER — NON-APPOINTMENT (OUTPATIENT)
Age: 61
End: 2021-11-22

## 2021-11-22 DIAGNOSIS — R52 PAIN, UNSPECIFIED: ICD-10-CM

## 2021-11-23 LAB — SURGICAL PATHOLOGY STUDY: SIGNIFICANT CHANGE UP

## 2021-11-26 ENCOUNTER — NON-APPOINTMENT (OUTPATIENT)
Age: 61
End: 2021-11-26

## 2021-12-01 ENCOUNTER — APPOINTMENT (OUTPATIENT)
Age: 61
End: 2021-12-01

## 2021-12-01 ENCOUNTER — OUTPATIENT (OUTPATIENT)
Dept: OUTPATIENT SERVICES | Facility: HOSPITAL | Age: 61
LOS: 1 days | End: 2021-12-01
Payer: COMMERCIAL

## 2021-12-01 VITALS
SYSTOLIC BLOOD PRESSURE: 122 MMHG | WEIGHT: 145.06 LBS | HEART RATE: 80 BPM | RESPIRATION RATE: 16 BRPM | OXYGEN SATURATION: 98 % | HEIGHT: 65 IN | TEMPERATURE: 98 F | DIASTOLIC BLOOD PRESSURE: 69 MMHG

## 2021-12-01 VITALS
OXYGEN SATURATION: 99 % | SYSTOLIC BLOOD PRESSURE: 128 MMHG | TEMPERATURE: 98 F | RESPIRATION RATE: 15 BRPM | HEART RATE: 82 BPM | DIASTOLIC BLOOD PRESSURE: 72 MMHG

## 2021-12-01 DIAGNOSIS — C50.912 MALIGNANT NEOPLASM OF UNSPECIFIED SITE OF LEFT FEMALE BREAST: ICD-10-CM

## 2021-12-01 LAB
ALBUMIN SERPL ELPH-MCNC: 3.7 G/DL — SIGNIFICANT CHANGE UP (ref 3.3–5)
ALP SERPL-CCNC: 75 U/L — SIGNIFICANT CHANGE UP (ref 40–120)
ALT FLD-CCNC: 31 U/L — SIGNIFICANT CHANGE UP (ref 10–45)
ANION GAP SERPL CALC-SCNC: 1 MMOL/L — LOW (ref 5–17)
AST SERPL-CCNC: 28 U/L — SIGNIFICANT CHANGE UP (ref 10–40)
BILIRUB SERPL-MCNC: 0.6 MG/DL — SIGNIFICANT CHANGE UP (ref 0.2–1.2)
BUN SERPL-MCNC: 13 MG/DL — SIGNIFICANT CHANGE UP (ref 7–23)
CALCIUM SERPL-MCNC: 9.7 MG/DL — SIGNIFICANT CHANGE UP (ref 8.4–10.5)
CHLORIDE SERPL-SCNC: 107 MMOL/L — SIGNIFICANT CHANGE UP (ref 96–108)
CO2 SERPL-SCNC: 32 MMOL/L — HIGH (ref 22–31)
CREAT SERPL-MCNC: 0.7 MG/DL — SIGNIFICANT CHANGE UP (ref 0.5–1.3)
GLUCOSE SERPL-MCNC: 103 MG/DL — HIGH (ref 70–99)
HCT VFR BLD CALC: 35.6 % — SIGNIFICANT CHANGE UP (ref 34.5–45)
HGB BLD-MCNC: 11.4 G/DL — LOW (ref 11.5–15.5)
LYMPHOCYTES # BLD AUTO: 2.6 K/UL — SIGNIFICANT CHANGE UP (ref 1–3.3)
LYMPHOCYTES # BLD AUTO: 44 % — SIGNIFICANT CHANGE UP (ref 13–44)
MCHC RBC-ENTMCNC: 30 PG — SIGNIFICANT CHANGE UP (ref 27–34)
MCHC RBC-ENTMCNC: 32 GM/DL — SIGNIFICANT CHANGE UP (ref 32–36)
MCV RBC AUTO: 93.7 FL — SIGNIFICANT CHANGE UP (ref 80–100)
NEUTROPHILS # BLD AUTO: 2.7 K/UL — SIGNIFICANT CHANGE UP (ref 1.8–7.4)
NEUTROPHILS NFR BLD AUTO: 48 % — SIGNIFICANT CHANGE UP (ref 43–77)
PLATELET # BLD AUTO: 196 K/UL — SIGNIFICANT CHANGE UP (ref 150–400)
POTASSIUM SERPL-MCNC: 3.6 MMOL/L — SIGNIFICANT CHANGE UP (ref 3.5–5.3)
POTASSIUM SERPL-SCNC: 3.6 MMOL/L — SIGNIFICANT CHANGE UP (ref 3.5–5.3)
PROT SERPL-MCNC: 6.5 G/DL — SIGNIFICANT CHANGE UP (ref 6–8.3)
RBC # BLD: 3.8 M/UL — SIGNIFICANT CHANGE UP (ref 3.8–5.2)
RBC # FLD: 13 % — SIGNIFICANT CHANGE UP (ref 10.3–14.5)
SODIUM SERPL-SCNC: 140 MMOL/L — SIGNIFICANT CHANGE UP (ref 135–145)
WBC # BLD: 5.8 K/UL — SIGNIFICANT CHANGE UP (ref 3.8–10.5)
WBC # FLD AUTO: 5.8 K/UL — SIGNIFICANT CHANGE UP (ref 3.8–10.5)

## 2021-12-01 PROCEDURE — 36415 COLL VENOUS BLD VENIPUNCTURE: CPT

## 2021-12-01 PROCEDURE — 80053 COMPREHEN METABOLIC PANEL: CPT

## 2021-12-01 PROCEDURE — 85025 COMPLETE CBC W/AUTO DIFF WBC: CPT

## 2021-12-01 PROCEDURE — 96523 IRRIG DRUG DELIVERY DEVICE: CPT

## 2021-12-01 PROCEDURE — 96415 CHEMO IV INFUSION ADDL HR: CPT

## 2021-12-01 PROCEDURE — 96413 CHEMO IV INFUSION 1 HR: CPT

## 2021-12-01 RX ORDER — ADO-TRASTUZUMAB EMTANSINE 20 MG/ML
252 INJECTION, POWDER, LYOPHILIZED, FOR SOLUTION INTRAVENOUS ONCE
Refills: 0 | Status: COMPLETED | OUTPATIENT
Start: 2021-12-22 | End: 2021-12-22

## 2021-12-01 RX ORDER — ADO-TRASTUZUMAB EMTANSINE 20 MG/ML
252 INJECTION, POWDER, LYOPHILIZED, FOR SOLUTION INTRAVENOUS ONCE
Refills: 0 | Status: COMPLETED | OUTPATIENT
Start: 2022-06-22 | End: 2022-06-22

## 2021-12-01 RX ORDER — ADO-TRASTUZUMAB EMTANSINE 20 MG/ML
252 INJECTION, POWDER, LYOPHILIZED, FOR SOLUTION INTRAVENOUS ONCE
Refills: 0 | Status: COMPLETED | OUTPATIENT
Start: 2021-12-01 | End: 2021-12-01

## 2021-12-01 RX ORDER — ADO-TRASTUZUMAB EMTANSINE 20 MG/ML
252 INJECTION, POWDER, LYOPHILIZED, FOR SOLUTION INTRAVENOUS ONCE
Refills: 0 | Status: COMPLETED | OUTPATIENT
Start: 2022-08-03 | End: 2022-08-03

## 2021-12-01 RX ADMIN — ADO-TRASTUZUMAB EMTANSINE 175.07 MILLIGRAM(S): 20 INJECTION, POWDER, LYOPHILIZED, FOR SOLUTION INTRAVENOUS at 13:17

## 2021-12-01 RX ADMIN — ADO-TRASTUZUMAB EMTANSINE 252 MILLIGRAM(S): 20 INJECTION, POWDER, LYOPHILIZED, FOR SOLUTION INTRAVENOUS at 14:50

## 2021-12-02 ENCOUNTER — APPOINTMENT (OUTPATIENT)
Dept: BREAST CENTER | Facility: CLINIC | Age: 61
End: 2021-12-02
Payer: COMMERCIAL

## 2021-12-02 VITALS
HEART RATE: 84 BPM | SYSTOLIC BLOOD PRESSURE: 115 MMHG | WEIGHT: 141 LBS | BODY MASS INDEX: 25.95 KG/M2 | DIASTOLIC BLOOD PRESSURE: 75 MMHG | HEIGHT: 62 IN

## 2021-12-02 PROCEDURE — 99024 POSTOP FOLLOW-UP VISIT: CPT

## 2021-12-02 NOTE — HISTORY OF PRESENT ILLNESS
Patient seen on skin care rounds after wound care referral received for assessment of skin impairment and recommendations of topical management. Chart reviewed: WBC 13.12, H/H 8.0/24.9, Platelets 266, BMI 27.5, Serum albumin 2.1, A1C 10.3%, Eric 11, CT neck -Findings suggestive of severe right parotiditis and/or masseter myositis with extensive associated cellulitis involving the entirety the right face. Fluid tracking in the subcutaneous tissues from the right temporal scalp to the right clavicle, but with no rim enhancement to suggest abscess formation at this time. Differential includes dependent edema in this patient with history of proning. Patient H/O of CAD s/p CABG and 2 stents, Type 2 DM, HTN, BPH s/p TURP and Parkinson's disease, who was recently admitted on 3/14 for weakness, covid, hypoxia, started on remdesivir/decadron, readmitted with respiratory distress 2/2 COVID (3/17), intubated, started on remdesivir, course c/b septic shock, ZELALEM, and A-fib.  Pt developed right facial edema 3 days ago, diagnosed as parotidis, on course of Unasyn. Patient seen by Nephrology for hyponatremia, Endocrinology for DM2, Pulmonology for hypoxia, ENT for right cheek swelling recommending warm compresses and OMFS for right facial swelling- no surgical intervention.  [FreeTextEntry1] : 61 yo female presents for post-op evaluation of left 2:00 5cmFN IDC ER 0%, [place] 0%, HER-2 equivocal, FISH positive s/p L breast upper outer lumpectomy on 11/19/21 path showing poorly differentiated IDC measuring .9 cm with definitive response to presurgical therapy, NSLNB (0/1) SLNB (0/1). Of note, positive family history but patient has declined genetic testing.\par \par Finished with TCHP on 10/12/2021 with Dr. Fontenot\par Continuing Herceptin/Perjeta [id number]; treated yesterday.  Feels a bit tired.\par Patient is feeling well.\par No major complaints

## 2021-12-02 NOTE — DATA REVIEWED
[FreeTextEntry1] : 5/6/21: Julián DXMG & US ([facility]): heterogeneously dense, L - 2.6cm ill-defined mass in reigon of palpable concern, inferior areas of asymmetry which efface, US - L - 2.9cm ill-defined hypoechoic mass 2:00 5FN, 0.2cm complicated cyst 11:00 6FN. BIRADS 5.\par 5/12/21: L US Guided bx x2 ([facility]): "R shaped clip" L 2:00 15FN - 1.2cm invasive ductal carcinoma w/ assoc chronic inflammatory infiltrate, poorly differentiated , ER (-), AK (-), HER2 Equivocal, FISH (+) L 1.1cm axillary LN 18FN - "S Shaped Clip" reactive LN, negative for carcinoma \par \par 6/18/2021 ([facility]) bilateral breast MRI showing 1. The index malignancy in the 2:00 axis of the left breast measures 3.4 cm x 2.3 cm x 2.4 cm. 2. A 3 mm focus of enhancement in the 5:00 axis of the left breast posterior depth is suspicious for malignancy. MR guided core needle biopsy is recommended.\par 3. Incidentally seen is a 2.4 cm T2 bright lesion in the posterior right hepatic dome. Abdominal ultrasound is recommended. MRI guided biopsy of left 5:00 axis recommended. BIRADS 4\par \par 10/19/2021 ([facility]) bilateral breast MRI 1. Post neoadjuvant chemotherapy, significant decreased in size of index lesion, left breast 2:00 axis, residual foci of enhancement measuring in total 1.3 x 0.8 cm surrounding the biopsy clip. 2. Interval resolution of the left breast 5:00 axis suspicious focus of enhancement. 3. T2 hyperintense lesion in the right hepatic dome, likely hepatic cyst. No change.\par 4. No MRI evidence of right breast malignancy. \par \par 11/19/2021 ([facility] pathology) left breast UOQ lumpectomy pathology: Breast, left, upper outer; lumpectomy:\par - Invasive ductal carcinoma, poorly differentiated, with definitive\par response to presurgical therapy, see synoptic report\par - Invasive carcinoma measures 9 mm in largest extent\par - Margins, as present in this specimen: Posterior: positive, for final posterior margin see part 6\par Anterior: 1 mm\par Medial: 2 mm, for final medial margin see part 3\par All remaining margins: over 5 mm\par - Biopsy site changes\par - Calcifications associated with wall of blood vessels\par SLNB pathology: 7. Axilla, left, non-sentinel nodes; biopsy:\par - One lymph node negative for metastatic carcinoma (0/1) 8. Axilla, left, sentinel node; biopsy: - One lymph node negative for metastatic carcinoma (0/1) - Biopsy site changes

## 2021-12-02 NOTE — REASON FOR VISIT
[Follow-Up: _____] : a [unfilled] follow-up visit [FreeTextEntry1] :  left 2:00 5cmFN IDC ER 0%, AL 0%, HER-2 equivocal, FISH positive

## 2021-12-02 NOTE — PHYSICAL EXAM
[Supple] : supple [No Supraclavicular Adenopathy] : no supraclavicular adenopathy [No Cervical Adenopathy] : no cervical adenopathy [Examined in the supine and seated position] : examined in the supine and seated position [No dominant masses] : no dominant masses in right breast  [No Nipple Retraction] : no left nipple retraction [No Nipple Discharge] : no left nipple discharge [No Axillary Lymphadenopathy] : no left axillary lymphadenopathy [de-identified] : Incision clean, dry and intact.  Ends of sutures cut. New steristrips applied.\par

## 2021-12-02 NOTE — ASSESSMENT
[FreeTextEntry1] : Patient is clear margins from surgery.  Has done well from the procedure.  Is to continue systemic therapy.  I will see her back in 3 months and repeat her sozo.  She also needs a radiation oncology consultation.

## 2021-12-22 ENCOUNTER — LABORATORY RESULT (OUTPATIENT)
Age: 61
End: 2021-12-22

## 2021-12-22 ENCOUNTER — OUTPATIENT (OUTPATIENT)
Dept: OUTPATIENT SERVICES | Facility: HOSPITAL | Age: 61
LOS: 1 days | End: 2021-12-22
Payer: COMMERCIAL

## 2021-12-22 ENCOUNTER — APPOINTMENT (OUTPATIENT)
Age: 61
End: 2021-12-22

## 2021-12-22 ENCOUNTER — APPOINTMENT (OUTPATIENT)
Dept: HEMATOLOGY ONCOLOGY | Facility: CLINIC | Age: 61
End: 2021-12-22
Payer: COMMERCIAL

## 2021-12-22 VITALS
SYSTOLIC BLOOD PRESSURE: 148 MMHG | HEART RATE: 96 BPM | OXYGEN SATURATION: 96 % | RESPIRATION RATE: 18 BRPM | WEIGHT: 139 LBS | DIASTOLIC BLOOD PRESSURE: 52 MMHG | HEIGHT: 62 IN | BODY MASS INDEX: 25.58 KG/M2 | TEMPERATURE: 98.3 F

## 2021-12-22 VITALS
HEART RATE: 86 BPM | OXYGEN SATURATION: 98 % | TEMPERATURE: 98 F | RESPIRATION RATE: 17 BRPM | SYSTOLIC BLOOD PRESSURE: 124 MMHG | DIASTOLIC BLOOD PRESSURE: 70 MMHG | WEIGHT: 138.89 LBS | HEIGHT: 65 IN

## 2021-12-22 VITALS
RESPIRATION RATE: 16 BRPM | DIASTOLIC BLOOD PRESSURE: 72 MMHG | OXYGEN SATURATION: 99 % | SYSTOLIC BLOOD PRESSURE: 130 MMHG | HEART RATE: 82 BPM | TEMPERATURE: 97 F

## 2021-12-22 DIAGNOSIS — C50.912 MALIGNANT NEOPLASM OF UNSPECIFIED SITE OF LEFT FEMALE BREAST: ICD-10-CM

## 2021-12-22 LAB — SARS-COV-2 RNA SPEC QL NAA+PROBE: NEGATIVE — SIGNIFICANT CHANGE UP

## 2021-12-22 PROCEDURE — 99214 OFFICE O/P EST MOD 30 MIN: CPT | Mod: 25

## 2021-12-22 PROCEDURE — 36415 COLL VENOUS BLD VENIPUNCTURE: CPT

## 2021-12-22 PROCEDURE — 96523 IRRIG DRUG DELIVERY DEVICE: CPT

## 2021-12-22 PROCEDURE — 96413 CHEMO IV INFUSION 1 HR: CPT

## 2021-12-22 PROCEDURE — U0003: CPT

## 2021-12-22 RX ORDER — OXYCODONE AND ACETAMINOPHEN 5; 325 MG/1; MG/1
5-325 TABLET ORAL
Qty: 12 | Refills: 0 | Status: DISCONTINUED | COMMUNITY
Start: 2021-11-22 | End: 2021-12-22

## 2021-12-22 RX ADMIN — ADO-TRASTUZUMAB EMTANSINE 525.2 MILLIGRAM(S): 20 INJECTION, POWDER, LYOPHILIZED, FOR SOLUTION INTRAVENOUS at 15:22

## 2021-12-22 RX ADMIN — ADO-TRASTUZUMAB EMTANSINE 252 MILLIGRAM(S): 20 INJECTION, POWDER, LYOPHILIZED, FOR SOLUTION INTRAVENOUS at 15:52

## 2021-12-22 NOTE — ASSESSMENT
[FreeTextEntry1] : 61 yo AA female referred by Dr. Tello Shha for evaluation of newly diagnosed left breast cancer - at least 2.9 cm, \par IDC, ER neg, [place] neg, HER2 patricia IHC equivocal, FISH amplified.  Left axilla LN negative.\par \par Patient accompanied by her . We reviewed the pathobiology of breast cancer, indication for NACT with HER2 targeted treatment.  Patient offered TCHP based on the TRYPHAENA protocol. \par \par Side effects and schema reviewed with patient. \par \par 6/30/2021\par  cycle 1 TCHP- tx plan and schema reviewed with pt and spouse\par ECHO 6/24/2021- LVEF 55%\par labs stable\par \par 7/14/2021\par Cycle 1, day 14\par Nausea - required meds day 3\par Insomnia\par Pelvic pain \par Diarrhea 3 x per day, loose BM -1-2 per day now\par Tingling sensation in fingers \par Pain meds prescribed - tramadol\par Stressed that if no appetite push liquids, and if no tolerance come IV fluids \par  return in 1 week for tx\par \par 7/21/2021\par TCHP # 2- day 1\par - patient feels better, mild pedal edema.\par - tachycardia- baseline - regular\par - BP - she takes amlodipine 2.5 mg - might contribute to swelling, fluid retention\par - extend dexa 5 mg PO BID x 5 days after treatment to alleviate pain and decrease risk of swelling\par - might benefit from HCTZ, will follow up with PCP\par - reviewed side effects\par \par 8/11/2021\par Patient for cycle 3 TCHP\par felt much better after cycle 2, felt Zyrtec decreased bone pain\par Denies nausea, diarrhea\par Mouth sore resolved, cannot afford Magic Mouthwash- send for viscus lidocaine PRN and will use salt water\par weight back to baseline\par Trace of pedal edema resolved\par \par 9/22/2021\par TCHP cycle 5 today\par Concerned about weakness in legs - leg swelling, start diuretics\par if progression of LE weakness will hold docetaxel with cycle 6\par HTN - start HCTZ\par Patient working from home, feels tired. \par She doesn’t want vaccine for COVID. \par Insomnia - will try melatonin\par ECHO -report pending \par \par 10/13/2021\par IDC T2N0 on neoadjuvant chemotherapy\par Excellent clinical response\par Patient for TCHP  6/6, \par She is happy she is finishing chemotherapy part.\par C/o weakness in the legs and neuropathy - referred to PT and hold Docetaxel last dose\par Explained plan for MRI, surgical follow up with Dr. Shah \par \par Plan to continue Trastuzumab and pertuzumab q 3 weeks until surgery.\par \par Following surgery if there is complete pathological response patient will continue trastuzumab and pertuzumab every 3 weeks for total of 17 cycles ( including neoadjuvant treatment) .\par If there is no pCR patient will be changed to TDM-1 for 14 cycles\par \par 12/22/2021\par 11/19/2021 ([facility] pathology) left breast UOQ lumpectomy pathology: Breast, left, upper outer; lumpectomy:\par - Invasive ductal carcinoma, poorly differentiated, with definitive\par response to presurgical therapy, see synoptic report\par - Invasive carcinoma measures 9 mm in largest extent\par - Margins, as present in this specimen: Posterior: positive, for final posterior margin see part 6\par Anterior: 1 mm\par Medial: 2 mm, for final medial margin see part 3\par All remaining margins: over 5 mm\par - Biopsy site changes\par - Calcifications associated with wall of blood vessels\par SLNB pathology: 7. Axilla, left, non-sentinel nodes; biopsy:\par - One lymph node negative for metastatic carcinoma (0/1) 8. Axilla, left, sentinel node; biopsy: - One lymph node negative for metastatic carcinoma (0/1) - Biopsy site changes. \par -Pt here for Kadcyla #2- tolerating well \par -due for ECHO\par - Apt with Dr. Lombardo 12/28 for RT consult\par -ongoing weight loss- refer to nutritionist- defers any medication to stimulate appetite    \par - labs reviewed\par \par  check cbc and chem, case and mgmt discussed with Dr. Fontenot

## 2021-12-22 NOTE — REVIEW OF SYSTEMS
[Fatigue] : fatigue [Anxiety] : anxiety [Negative] : Allergic/Immunologic [Recent Change In Weight] : ~T recent weight change [de-identified] : neuropathy - ongoing but stable

## 2021-12-22 NOTE — PHYSICAL EXAM
[Fully active, able to carry on all pre-disease performance without restriction] : Status 0 - Fully active, able to carry on all pre-disease performance without restriction [Normal] : affect appropriate [de-identified] : left breast 2 o'clock - 5 cm form nipple non-fixed mass 5  cm - decreased [de-identified] : trace pedal edema

## 2021-12-22 NOTE — HISTORY OF PRESENT ILLNESS
[Disease: _____________________] : Disease: [unfilled] [T: ___] : T[unfilled] [N: ___] : N[unfilled] [de-identified] : 60 year old female presents today for initial consultation of breast cancer, referred by Dr. Shah.  She was sent for diagnostic mammogram for which she felt a left upper quadrant palpalbe mass x 4 months.  \par \par Most recent mammo 5/6/21: showed left 2:00 highly suspicious mass and prominent left axillary lymph node for which an US guided core biopsy was recommended \par \par 5/17/21: left breast US guide core biopsy showed invasive poorly differentiated ductal carcinoma associated with chronic inflammatory infiltrate at the left 12:00 position. The left axillary lymph node biopsy demonstrated a reactive lymph node, negative for carcinoma. These findings are concordant with imaging. ER- ID-, Her 2 amplified FISH. Left axillary LN- reactive on pathology.\par \par \par Risk Factors:\par Age at menarche- 11\par Age at menopause-49\par G6,P6\par Age at first live birth-17\par OCP-denies\par HRT-denies\par Family History-denies\par Genetics-denies\par Smoker-former smoker\par \par 9/22/2021\par Patient cycle 5 TCHP- c/o fatigue, trace of pedal edema. ECHO pending [de-identified] : ER -neg, [place] neg, HER 2 amplified  [de-identified] : Patient presents today for breast cancer - here for Kadcyla # 2

## 2021-12-23 ENCOUNTER — NON-APPOINTMENT (OUTPATIENT)
Age: 61
End: 2021-12-23

## 2021-12-23 LAB
25(OH)D3 SERPL-MCNC: 10.9 NG/ML
ALBUMIN SERPL ELPH-MCNC: 4.2 G/DL
ALP BLD-CCNC: 83 U/L
ALT SERPL-CCNC: 25 U/L
ANION GAP SERPL CALC-SCNC: 19 MMOL/L
AST SERPL-CCNC: 23 U/L
BASOPHILS # BLD AUTO: 0.03 K/UL
BASOPHILS NFR BLD AUTO: 0.6 %
BILIRUB SERPL-MCNC: 0.5 MG/DL
BUN SERPL-MCNC: 17 MG/DL
CALCIUM SERPL-MCNC: 10 MG/DL
CANCER AG27-29 SERPL-ACNC: 13.2 U/ML
CEA SERPL-MCNC: 1.2 NG/ML
CHLORIDE SERPL-SCNC: 100 MMOL/L
CO2 SERPL-SCNC: 21 MMOL/L
CREAT SERPL-MCNC: 0.6 MG/DL
CRP SERPL-MCNC: <3 MG/L
EOSINOPHIL # BLD AUTO: 0.06 K/UL
EOSINOPHIL NFR BLD AUTO: 1.1 %
GLUCOSE SERPL-MCNC: 57 MG/DL
HCT VFR BLD CALC: 37.9 %
HGB BLD-MCNC: 11.8 G/DL
IMM GRANULOCYTES NFR BLD AUTO: 0.2 %
LDH SERPL-CCNC: 257 U/L
LYMPHOCYTES # BLD AUTO: 2.99 K/UL
LYMPHOCYTES NFR BLD AUTO: 57.2 %
MAGNESIUM SERPL-MCNC: 1.5 MG/DL
MAN DIFF?: NORMAL
MCHC RBC-ENTMCNC: 28.4 PG
MCHC RBC-ENTMCNC: 31.1 GM/DL
MCV RBC AUTO: 91.1 FL
MONOCYTES # BLD AUTO: 0.45 K/UL
MONOCYTES NFR BLD AUTO: 8.6 %
NEUTROPHILS # BLD AUTO: 1.69 K/UL
NEUTROPHILS NFR BLD AUTO: 32.3 %
PHOSPHATE SERPL-MCNC: 2.8 MG/DL
PLATELET # BLD AUTO: 262 K/UL
POTASSIUM SERPL-SCNC: 3.7 MMOL/L
PROT SERPL-MCNC: 7.3 G/DL
RBC # BLD: 4.16 M/UL
RBC # FLD: 12.2 %
SODIUM SERPL-SCNC: 140 MMOL/L
URATE SERPL-MCNC: 5.4 MG/DL
WBC # FLD AUTO: 5.23 K/UL

## 2021-12-28 ENCOUNTER — APPOINTMENT (OUTPATIENT)
Dept: RADIATION ONCOLOGY | Facility: CLINIC | Age: 61
End: 2021-12-28
Payer: COMMERCIAL

## 2021-12-28 PROCEDURE — 99443: CPT | Mod: 95

## 2021-12-28 NOTE — HISTORY OF PRESENT ILLNESS
[FreeTextEntry1] : Ms. Brigdia Pedroza is a 61 year old female diagnosed with cT2N0 invasive breast cancer, status post marcela-adjuvant chemotherapy with TCHP 6/6, status post Left sided lumpectomy on 11/19/21 revealing LEFT breast 9mm poorly differentiated IDC, (ER-, GA-, HER2+), [id number], Stage 1A, with definitive response to presurgical chemo with negative margins and neg SLNBx (0/2).  She had declined genetic testing.  She plans to continue Herceptin/Perjeta q3 weeks for 17 cycles. Due to concerns regarding coronavirus, she presents today for telehealth consultation.\par \par 5/6/21: Julián DXMG & US ([facility]): heterogeneously dense, L - 2.6cm ill-defined mass in reigon of palpable concern, inferior areas of asymmetry which efface, US - L - 2.9cm ill-defined hypoechoic mass 2:00 5FN, 0.2cm complicated cyst 11:00 6FN. BIRADS 5.\par 5/12/21: L US Guided bx x2 ([facility]): "R shaped clip" L 2:00 15FN - 1.2cm invasive ductal carcinoma w/ assoc chronic inflammatory infiltrate, poorly differentiated , ER (-), GA (-), HER2 Equivocal, FISH (+) L 1.1cm axillary LN 18FN - "S Shaped Clip" reactive LN, negative for carcinoma \par \par 6/18/2021 ([facility]) bilateral breast MRI showing 1. The index malignancy in the 2:00 axis of the left breast measures 3.4 cm x 2.3 cm x 2.4 cm. 2. A 3 mm focus of enhancement in the 5:00 axis of the left breast posterior depth is suspicious for malignancy. MR guided core needle biopsy is recommended.\par 3. Incidentally seen is a 2.4 cm T2 bright lesion in the posterior right hepatic dome. Abdominal ultrasound is recommended. MRI guided biopsy of left 5:00 axis recommended. BIRADS 4\par \par She was placed on marcela-adjuvant chemotherapy with TCHP 6/6 with Dr. Fontenot, completed on 10/13/21.\par \par 10/19/2021 ([facility]) bilateral breast MRI 1. Post neoadjuvant chemotherapy, significant decreased in size of index lesion, left breast 2:00 axis, residual foci of enhancement measuring in total 1.3 x 0.8 cm surrounding the biopsy clip. 2. Interval resolution of the left breast 5:00 axis suspicious focus of enhancement. 3. T2 hyperintense lesion in the right hepatic dome, likely hepatic cyst. No change.\par 4. No MRI evidence of right breast malignancy. \par \par 11/19/2021 ([facility] pathology) left breast UOQ lumpectomy pathology: Breast, left, upper outer; lumpectomy:\par Final Diagnosis\par 1. Breast, left, upper outer; lumpectomy:\par - Invasive ductal carcinoma, poorly differentiated, with definitive\par response to presurgical therapy, see synoptic report\par - Invasive carcinoma measures 9 mm in largest extent\par - Margins, as present in this specimen:\par Posterior: positive, for final posterior margin see part 6\par Anterior: 1 mm\par Medial: 2 mm, for final medial margin see part 3\par All remaining margins: over 5 mm\par - Biopsy site changes\par - Calcifications associated with wall of blood vessels\par \par 2. Breast, left, superior margin; excision:\par - Benign breast tissue\par \par 3. Breast, left, medial margin; excision:\par - Benign breast tissue\par \par 4. Breast, left, inferior margin; excision:\par - Benign breast tissue\par \par 5. Breast, left, lateral margin; excision:\par - Benign breast tissue with changes of prior procedure and portion of\par tumor bed area\par \par 6. Breast, left, deep margin; excision:\par - Benign fibroadipose tissue and skeletal muscle\par \par 7. Axilla, left, non-sentinel nodes; biopsy:\par - One lymph node negative for metastatic carcinoma (0/1)\par \par 8. Axilla, left, sentinel node; biopsy:\par - One lymph node negative for metastatic carcinoma (0/1)\par - Biopsy site changes\par \par Note: Repeat testing for ER, GA and HER-2 will be reported in an addendum.\par \par Verified by: Diamond Barfield M.D.\par (Electronic Signature)\par Reported on: 11/23/21 14:21 EST, [facility], [place], [address],\[place] [address]\par Phone: [phone]   Fax: [phone]\par _________________________________________________________________\par \par Comment\par MOL 1C\par \par \par Synoptic Summary\par INVASIVE CARCINOMA OF THE BREAST: Resection\par SPECIMEN\par Procedure:  Excision (less than total mastectomy)\par Specimen Laterality:  Left\par TUMOR\par Tumor Site: Clock position -  2 o'clock\par Histologic Type:  Ductal\par Glandular (Acinar) / Tubular Differentiation:   Score 3\par Nuclear Pleomorphism:  Score 3\par Mitotic Rate:  Score 2\par Overall Grade:  Grade 3 (scores of 8 or 9)\par Tumor Size: Greatest dimension of largest invasive focus (Millimeters)\par - 9 mm\par Ductal Carcinoma In Situ (DCIS):   Not identified\par Tumor Extent\par Skin:  Skin is not present\par Skeletal Muscle:  Skeletal muscle is free of carcinoma\par Lymphovascular Invasion:  Not identified\par Treatment Effect in the Breast:   Probable or definite response to\par presurgical therapy in the invasive carcinoma\par Treatment Effect in the Lymph Nodes:   No lymph node metastases and no\par fibrous scarring or histiocytic aggregates in the nodes\par MARGINS\par Invasive Carcinoma Margins:  Uninvolved by invasive carcinoma\par Distance from Closest Margin (Millimeters):   1 mm\par Closest Margin(s):  Anterior\par LYMPH NODES\par Regional Lymph Nodes:  Uninvolved by tumor cells\par Total Number of Lymph Nodes Examined:   2\par Number of [place] Nodes Examined:   1\par PATHOLOGIC STAGE CLASSIFICATION (pTNM, AJCC 8th Edition)\par Note: Reporting of pT, pN, and (when applicable) pM categories is based\par on information available to the pathologist at the time the report\par is issued. As per the AJCC (Chapter 1, 8th Ed.) it is the managing\par physician's responsibility to establish the final pathologic stage based\par upon all pertinent information, including but potentially not limited to\par this pathology report.\par TNM Descriptors:  y (post-treatment)\par Primary Tumor (pT):  pT1b\par Regional Lymph Nodes Modifier:   (sn): [place] node(s) evaluated\par Regional Lymph Nodes (pN):  pN0\par Distant Metastasis (pM):  Not applicable - pM cannot be determined from\par the submitted specimen(s)\par \par Overall, the patient notes that she is healing well from surgery.  She denies a history of radiation therapy.  She lives in [place], and expressed some anxiety related to transportation for treatment and was offered a consultation and treatment closer to home.

## 2022-01-12 ENCOUNTER — APPOINTMENT (OUTPATIENT)
Age: 62
End: 2022-01-12

## 2022-01-12 ENCOUNTER — OUTPATIENT (OUTPATIENT)
Dept: OUTPATIENT SERVICES | Facility: HOSPITAL | Age: 62
LOS: 1 days | End: 2022-01-12
Payer: COMMERCIAL

## 2022-01-12 ENCOUNTER — APPOINTMENT (OUTPATIENT)
Dept: HEMATOLOGY ONCOLOGY | Facility: CLINIC | Age: 62
End: 2022-01-12

## 2022-01-12 ENCOUNTER — APPOINTMENT (OUTPATIENT)
Dept: HEMATOLOGY ONCOLOGY | Facility: CLINIC | Age: 62
End: 2022-01-12
Payer: COMMERCIAL

## 2022-01-12 VITALS
RESPIRATION RATE: 18 BRPM | DIASTOLIC BLOOD PRESSURE: 75 MMHG | SYSTOLIC BLOOD PRESSURE: 123 MMHG | WEIGHT: 138.89 LBS | HEART RATE: 92 BPM | TEMPERATURE: 98 F | HEIGHT: 62 IN | OXYGEN SATURATION: 98 %

## 2022-01-12 VITALS
WEIGHT: 139 LBS | DIASTOLIC BLOOD PRESSURE: 75 MMHG | HEART RATE: 92 BPM | HEIGHT: 62 IN | BODY MASS INDEX: 25.58 KG/M2 | SYSTOLIC BLOOD PRESSURE: 123 MMHG | RESPIRATION RATE: 18 BRPM | OXYGEN SATURATION: 96 % | TEMPERATURE: 98.1 F

## 2022-01-12 DIAGNOSIS — C50.912 MALIGNANT NEOPLASM OF UNSPECIFIED SITE OF LEFT FEMALE BREAST: ICD-10-CM

## 2022-01-12 LAB
ALBUMIN SERPL ELPH-MCNC: 3.4 G/DL — SIGNIFICANT CHANGE UP (ref 3.3–5)
ALP SERPL-CCNC: 77 U/L — SIGNIFICANT CHANGE UP (ref 40–120)
ALT FLD-CCNC: 24 U/L — SIGNIFICANT CHANGE UP (ref 10–45)
ANION GAP SERPL CALC-SCNC: 8 MMOL/L — SIGNIFICANT CHANGE UP (ref 5–17)
AST SERPL-CCNC: 34 U/L — SIGNIFICANT CHANGE UP (ref 10–40)
BILIRUB SERPL-MCNC: 0.7 MG/DL — SIGNIFICANT CHANGE UP (ref 0.2–1.2)
BUN SERPL-MCNC: 12 MG/DL — SIGNIFICANT CHANGE UP (ref 7–23)
CALCIUM SERPL-MCNC: 10.1 MG/DL — SIGNIFICANT CHANGE UP (ref 8.4–10.5)
CHLORIDE SERPL-SCNC: 109 MMOL/L — HIGH (ref 96–108)
CO2 SERPL-SCNC: 31 MMOL/L — SIGNIFICANT CHANGE UP (ref 22–31)
CREAT SERPL-MCNC: 0.9 MG/DL — SIGNIFICANT CHANGE UP (ref 0.5–1.3)
GLUCOSE SERPL-MCNC: 103 MG/DL — HIGH (ref 70–99)
HCT VFR BLD CALC: 35.8 % — SIGNIFICANT CHANGE UP (ref 34.5–45)
HGB BLD-MCNC: 11.3 G/DL — LOW (ref 11.5–15.5)
LYMPHOCYTES # BLD AUTO: 2.3 K/UL — SIGNIFICANT CHANGE UP (ref 1–3.3)
LYMPHOCYTES # BLD AUTO: 52.8 % — HIGH (ref 13–44)
MCHC RBC-ENTMCNC: 28 PG — SIGNIFICANT CHANGE UP (ref 27–34)
MCHC RBC-ENTMCNC: 31.6 GM/DL — LOW (ref 32–36)
MCV RBC AUTO: 88.8 FL — SIGNIFICANT CHANGE UP (ref 80–100)
NEUTROPHILS # BLD AUTO: 1.7 K/UL — LOW (ref 1.8–7.4)
NEUTROPHILS NFR BLD AUTO: 39.6 % — LOW (ref 43–77)
PLATELET # BLD AUTO: 249 K/UL — SIGNIFICANT CHANGE UP (ref 150–400)
POTASSIUM SERPL-MCNC: 3.6 MMOL/L — SIGNIFICANT CHANGE UP (ref 3.5–5.3)
POTASSIUM SERPL-SCNC: 3.6 MMOL/L — SIGNIFICANT CHANGE UP (ref 3.5–5.3)
PROT SERPL-MCNC: 6.9 G/DL — SIGNIFICANT CHANGE UP (ref 6–8.3)
RBC # BLD: 4.03 M/UL — SIGNIFICANT CHANGE UP (ref 3.8–5.2)
RBC # FLD: 13 % — SIGNIFICANT CHANGE UP (ref 10.3–14.5)
SODIUM SERPL-SCNC: 148 MMOL/L — HIGH (ref 135–145)
WBC # BLD: 4.3 K/UL — SIGNIFICANT CHANGE UP (ref 3.8–10.5)
WBC # FLD AUTO: 4.3 K/UL — SIGNIFICANT CHANGE UP (ref 3.8–10.5)

## 2022-01-12 PROCEDURE — 36415 COLL VENOUS BLD VENIPUNCTURE: CPT

## 2022-01-12 PROCEDURE — 80053 COMPREHEN METABOLIC PANEL: CPT

## 2022-01-12 PROCEDURE — 96413 CHEMO IV INFUSION 1 HR: CPT

## 2022-01-12 PROCEDURE — 99215 OFFICE O/P EST HI 40 MIN: CPT | Mod: 25

## 2022-01-12 PROCEDURE — 85025 COMPLETE CBC W/AUTO DIFF WBC: CPT

## 2022-01-12 RX ORDER — ADO-TRASTUZUMAB EMTANSINE 20 MG/ML
252 INJECTION, POWDER, LYOPHILIZED, FOR SOLUTION INTRAVENOUS ONCE
Refills: 0 | Status: COMPLETED | OUTPATIENT
Start: 2022-01-12 | End: 2022-01-12

## 2022-01-12 RX ADMIN — ADO-TRASTUZUMAB EMTANSINE 252 MILLIGRAM(S): 20 INJECTION, POWDER, LYOPHILIZED, FOR SOLUTION INTRAVENOUS at 14:15

## 2022-01-12 RX ADMIN — ADO-TRASTUZUMAB EMTANSINE 525.2 MILLIGRAM(S): 20 INJECTION, POWDER, LYOPHILIZED, FOR SOLUTION INTRAVENOUS at 13:45

## 2022-01-12 NOTE — ASSESSMENT
[FreeTextEntry1] : 61 yo AA female referred by Dr. Tello Shah for evaluation of newly diagnosed left breast cancer - at least 2.9 cm, \par IDC, ER neg, SD neg, HER2 patricia IHC equivocal, FISH amplified.  Left axilla LN negative.\par \par Patient accompanied by her . We reviewed the pathobiology of breast cancer, indication for NACT with HER2 targeted treatment.  Patient offered TCHP based on the TRYPHAENA protocol. \par \par Side effects and schema reviewed with patient. \par \par 6/30/2021\par  cycle 1 TCHP- tx plan and schema reviewed with pt and spouse\par ECHO 6/24/2021- LVEF 55%\par labs stable\par \par 7/14/2021\par Cycle 1, day 14\par Nausea - required meds day 3\par Insomnia\par Pelvic pain \par Diarrhea 3 x per day, loose BM -1-2 per day now\par Tingling sensation in fingers \par Pain meds prescribed - tramadol\par Stressed that if no appetite push liquids, and if no tolerance come IV fluids \par  return in 1 week for tx\par \par 7/21/2021\par TCHP # 2- day 1\par - patient feels better, mild pedal edema.\par - tachycardia- baseline - regular\par - BP - she takes amlodipine 2.5 mg - might contribute to swelling, fluid retention\par - extend dexa 5 mg PO BID x 5 days after treatment to alleviate pain and decrease risk of swelling\par - might benefit from HCTZ, will follow up with PCP\par - reviewed side effects\par \par 8/11/2021\par Patient for cycle 3 TCHP\par felt much better after cycle 2, felt Zyrtec decreased bone pain\par Denies nausea, diarrhea\par Mouth sore resolved, cannot afford Magic Mouthwash- send for viscus lidocaine PRN and will use salt water\par weight back to baseline\par Trace of pedal edema resolved\par \par 9/22/2021\par TCHP cycle 5 today\par Concerned about weakness in legs - leg swelling, start diuretics\par if progression of LE weakness will hold docetaxel with cycle 6\par HTN - start HCTZ\par Patient working from home, feels tired. \par She doesn’t want vaccine for COVID. \par Insomnia - will try melatonin\par ECHO -report pending \par \par 10/13/2021\par IDC T2N0 on neoadjuvant chemotherapy\par Excellent clinical response\par Patient for TCHP  6/6, \par She is happy she is finishing chemotherapy part.\par C/o weakness in the legs and neuropathy - referred to PT and hold Docetaxel last dose\par Explained plan for MRI, surgical follow up with Dr. Shah \par \par Plan to continue Trastuzumab and pertuzumab q 3 weeks until surgery.\par \par Following surgery if there is complete pathological response patient will continue trastuzumab and pertuzumab every 3 weeks for total of 17 cycles ( including neoadjuvant treatment) .\par If there is no pCR patient will be changed to TDM-1 for 14 cycles\par \par 12/22/2021\par 11/19/2021 ([facility] pathology) left breast UOQ lumpectomy pathology: Breast, left, upper outer; lumpectomy:\par - Invasive ductal carcinoma, poorly differentiated, with definitive\par response to presurgical therapy, see synoptic report\par - Invasive carcinoma measures 9 mm in largest extent\par - Margins, as present in this specimen: Posterior: positive, for final posterior margin see part 6\par Anterior: 1 mm\par Medial: 2 mm, for final medial margin see part 3\par All remaining margins: over 5 mm\par - Biopsy site changes\par - Calcifications associated with wall of blood vessels\par SLNB pathology: 7. Axilla, left, non-sentinel nodes; biopsy:\par - One lymph node negative for metastatic carcinoma (0/1) 8. Axilla, left, sentinel node; biopsy: - One lymph node negative for metastatic carcinoma (0/1) - Biopsy site changes. \par -Pt here for Kadcyla #2- tolerating well \par -due for ECHO\par - Apt with Dr. Lombardo 12/28 for RT consult\par -ongoing weight loss- refer to nutritionist- defers any medication to stimulate appetite    \par - labs reviewed\par \par 1/12/2022\par Kadcyla # 3\par Patient with neuropathy - LE, refer to neurologist for evaluation, referred to PT, OT.  Tingling sensation in LE, hands - feels less agile to draw. \par Option of treatment is fam-trastuzumab deruxtecan as patient high risk of recurrence and didn’t have CR after TCHP. There is reported neuropathy with Kadcyla in 21% and no evidence of neuropathy with Enhertu.\par I would not start Enhertu if approved by insurance until after RT completed. \par Enhertu 5.4 mg/ kg x 63= 3\par ECHO not done, reschedule\par patient went through COVID during [place], recovered well. \par \par \par  check cbc and chem,

## 2022-01-12 NOTE — HISTORY OF PRESENT ILLNESS
"Patient's home health care nurse manager is calling to discuss patient's status as well as treatment plan for her.\"I don't see her personally, there is another nurse doing the visits. I am concerned about her activity or inactivity. Want to know probability of healing and where patient should be at right now.\" Did explain that patient was seen on 7/23. Next steps in care are dependent on the results of the ordered MRI. Stated understanding. She plans on calling patient today. Contact information provided for scheduling the MRI with [facility]. She will share this with patient.    Home health nurse  is requesting a return call from [facility]. Her direct line is [phone]; \"OK to leave a detailed message as it is a confidential voice mail.\"       " [de-identified] : 60 year old female presents today for initial consultation of breast cancer, referred by Dr. Shah.  She was sent for diagnostic mammogram for which she felt a left upper quadrant palpalbe mass x 4 months.  \par \par Most recent mammo 5/6/21: showed left 2:00 highly suspicious mass and prominent left axillary lymph node for which an US guided core biopsy was recommended \par \par 5/17/21: left breast US guide core biopsy showed invasive poorly differentiated ductal carcinoma associated with chronic inflammatory infiltrate at the left 12:00 position. The left axillary lymph node biopsy demonstrated a reactive lymph node, negative for carcinoma. These findings are concordant with imaging. ER- RI-, Her 2 amplified FISH. Left axillary LN- reactive on pathology.\par \par \par Risk Factors:\par Age at menarche- 11\par Age at menopause-49\par G6,P6\par Age at first live birth-17\par OCP-denies\par HRT-denies\par Family History-denies\par Genetics-denies\par Smoker-former smoker\par \par 9/22/2021\par Patient cycle 5 TCHP- c/o fatigue, trace of pedal edema. ECHO pending [de-identified] : ER -neg, ID neg, HER 2 amplified  [de-identified] : Patient presents today for breast cancer - here for Kadcyla # 2

## 2022-01-20 ENCOUNTER — APPOINTMENT (OUTPATIENT)
Dept: RADIATION ONCOLOGY | Facility: CLINIC | Age: 62
End: 2022-01-20
Payer: COMMERCIAL

## 2022-01-20 PROCEDURE — 99214 OFFICE O/P EST MOD 30 MIN: CPT | Mod: 25,95

## 2022-01-20 NOTE — REASON FOR VISIT
[Home] : at home, [unfilled] , at the time of the visit. [Medical Office: ([facility])___] : at the medical office located in  [Verbal consent obtained from patient] : the patient, [unfilled] [Consideration of Curative Therapy] : consideration of curative therapy for [Breast Cancer] : breast cancer

## 2022-01-24 NOTE — HISTORY OF PRESENT ILLNESS
[FreeTextEntry1] : Ms. Brigida Pedroza is a 61 year old female diagnosed with cT2N0 invasive breast cancer, status post marcela-adjuvant chemotherapy with TCHP 6/6, status post Left sided lumpectomy on 11/19/21 revealing LEFT breast 9mm poorly differentiated IDC, (ER-, GA-, HER2+), [id number], Stage 1A, with definitive response to presurgical chemo with negative margins and neg SLNBx (0/2). She had declined genetic testing. She plans to continue Herceptin/Perjeta q3 weeks for 17 cycles. Due to concerns regarding coronavirus, she presents today for telehealth consultation.\par \par 5/6/21: Julián DXMG & US ([facility]): heterogeneously dense, L - 2.6cm ill-defined mass in reigon of palpable concern, inferior areas of asymmetry which efface, US - L - 2.9cm ill-defined hypoechoic mass 2:00 5FN, 0.2cm complicated cyst 11:00 6FN. BIRADS 5.\par 5/12/21: L US Guided bx x2 ([facility]): "R shaped clip" L 2:00 15FN - 1.2cm invasive ductal carcinoma w/ assoc chronic inflammatory infiltrate, poorly differentiated , ER (-), GA (-), HER2 Equivocal, FISH (+) L 1.1cm axillary LN 18FN - "S Shaped Clip" reactive LN, negative for carcinoma \par \par 6/18/2021 ([facility]) bilateral breast MRI showing 1. The index malignancy in the 2:00 axis of the left breast measures 3.4 cm x 2.3 cm x 2.4 cm. 2. A 3 mm focus of enhancement in the 5:00 axis of the left breast posterior depth is suspicious for malignancy. MR guided core needle biopsy is recommended.\par 3. Incidentally seen is a 2.4 cm T2 bright lesion in the posterior right hepatic dome. Abdominal ultrasound is recommended. MRI guided biopsy of left 5:00 axis recommended. BIRADS 4\par \par She was placed on marcela-adjuvant chemotherapy with TCHP 6/6 with Dr[address] Fontenot, completed on 10/13/21.\par \par 10/19/2021 ([facility]) bilateral breast MRI 1. Post neoadjuvant chemotherapy, significant decreased in size of index lesion, left breast 2:00 axis, residual foci of enhancement measuring in total 1.3 x 0.8 cm surrounding the biopsy clip. 2. Interval resolution of the left breast 5:00 axis suspicious focus of enhancement. 3. T2 hyperintense lesion in the right hepatic dome, likely hepatic cyst. No change.\par 4. No MRI evidence of right breast malignancy. \par \par 11/19/2021 ([facility] pathology) left breast UOQ lumpectomy pathology: Breast, left, upper outer; lumpectomy:\par Final Diagnosis\par 1. Breast, left, upper outer; lumpectomy:\par - Invasive ductal carcinoma, poorly differentiated, with definitive\par response to presurgical therapy, see synoptic report\par - Invasive carcinoma measures 9 mm in largest extent\par - Margins, as present in this specimen:\par Posterior: positive, for final posterior margin see part 6\par Anterior: 1 mm\par Medial: 2 mm, for final medial margin see part 3\par All remaining margins: over 5 mm\par - Biopsy site changes\par - Calcifications associated with wall of blood vessels\par \par 2. Breast, left, superior margin; excision:\par - Benign breast tissue\par \par 3. Breast, left, medial margin; excision:\par - Benign breast tissue\par \par 4. Breast, left, inferior margin; excision:\par - Benign breast tissue\par \par 5. Breast, left, lateral margin; excision:\par - Benign breast tissue with changes of prior procedure and portion of\par tumor bed area\par \par 6. Breast, left, deep margin; excision:\par - Benign fibroadipose tissue and skeletal muscle\par \par 7. Axilla, left, non-sentinel nodes; biopsy:\par - One lymph node negative for metastatic carcinoma (0/1)\par \par 8. Axilla, left, sentinel node; biopsy:\par - One lymph node negative for metastatic carcinoma (0/1)\par - Biopsy site changes\par \par Note: Repeat testing for ER, GA and HER-2 will be reported in an addendum.\par \par Verified by: Diamond Barfield M.D.\par (Electronic Signature)\par Reported on: 11/23/21 14:21 EST, [facility], [address],\[place] [address]\par Phone: [phone] Fax: [phone]\par _________________________________________________________________\par \par Comment\par MOL 1C\par \par \par Synoptic Summary\par INVASIVE CARCINOMA OF THE BREAST: Resection\par SPECIMEN\par Procedure: Excision (less than total mastectomy)\par Specimen Laterality: Left\par TUMOR\par Tumor Site: Clock position - 2 o'clock\par Histologic Type: Ductal\par Glandular (Acinar) / Tubular Differentiation: Score 3\par Nuclear Pleomorphism: Score 3\par Mitotic Rate: Score 2\par Overall Grade: Grade 3 (scores of 8 or 9)\par Tumor Size: Greatest dimension of largest invasive focus (Millimeters)\par - 9 mm\par Ductal Carcinoma In Situ (DCIS): Not identified\par Tumor Extent\par Skin: Skin is not present\par Skeletal Muscle: Skeletal muscle is free of carcinoma\par Lymphovascular Invasion: Not identified\par Treatment Effect in the Breast: Probable or definite response to\par presurgical therapy in the invasive carcinoma\par Treatment Effect in the Lymph Nodes: No lymph node metastases and no\par fibrous scarring or histiocytic aggregates in the nodes\par MARGINS\par Invasive Carcinoma Margins: Uninvolved by invasive carcinoma\par Distance from Closest Margin (Millimeters): 1 mm\par Closest Margin(s): Anterior\par LYMPH NODES\par Regional Lymph Nodes: Uninvolved by tumor cells\par Total Number of Lymph Nodes Examined: 2\par Number of [place] Nodes Examined: 1\par PATHOLOGIC STAGE CLASSIFICATION (pTNM, AJCC 8th Edition)\par Note: Reporting of pT, pN, and (when applicable) pM categories is based\par on information available to the pathologist at the time the report\par is issued. As per the AJCC (Chapter 1, 8th Ed.) it is the managing\par physician's responsibility to establish the final pathologic stage based\par upon all pertinent information, including but potentially not limited to\par this pathology report.\par TNM Descriptors: y (post-treatment)\par Primary Tumor (pT): pT1b\par Regional Lymph Nodes Modifier: (sn): [place] node(s) evaluated\par Regional Lymph Nodes (pN): pN0\par Distant Metastasis (pM): Not applicable - pM cannot be determined from\par the submitted specimen(s)\par \par 12/28/2021 Consultation - Overall, the patient notes that she is healing well from surgery. She denies a history of radiation therapy. She lives in [place], and expressed some anxiety related to transportation for treatment and was offered a consultation and treatment closer to home. \par

## 2022-01-24 NOTE — REVIEW OF SYSTEMS
[Negative] : Allergic/Immunologic [FreeTextEntry5] : HTN [de-identified] : left lumpectomy for breast cancer

## 2022-01-26 ENCOUNTER — NON-APPOINTMENT (OUTPATIENT)
Age: 62
End: 2022-01-26

## 2022-01-28 ENCOUNTER — OUTPATIENT (OUTPATIENT)
Dept: OUTPATIENT SERVICES | Facility: HOSPITAL | Age: 62
LOS: 1 days | End: 2022-01-28
Payer: COMMERCIAL

## 2022-01-28 DIAGNOSIS — C50.912 MALIGNANT NEOPLASM OF UNSPECIFIED SITE OF LEFT FEMALE BREAST: ICD-10-CM

## 2022-01-28 PROCEDURE — 93306 TTE W/DOPPLER COMPLETE: CPT

## 2022-01-28 PROCEDURE — 93306 TTE W/DOPPLER COMPLETE: CPT | Mod: 26

## 2022-01-28 PROCEDURE — 93356 MYOCRD STRAIN IMG SPCKL TRCK: CPT | Mod: 26

## 2022-02-02 ENCOUNTER — APPOINTMENT (OUTPATIENT)
Age: 62
End: 2022-02-02

## 2022-02-02 ENCOUNTER — OUTPATIENT (OUTPATIENT)
Dept: OUTPATIENT SERVICES | Facility: HOSPITAL | Age: 62
LOS: 1 days | End: 2022-02-02
Payer: COMMERCIAL

## 2022-02-02 VITALS
OXYGEN SATURATION: 96 % | DIASTOLIC BLOOD PRESSURE: 68 MMHG | SYSTOLIC BLOOD PRESSURE: 126 MMHG | TEMPERATURE: 97 F | RESPIRATION RATE: 18 BRPM | HEART RATE: 93 BPM

## 2022-02-02 DIAGNOSIS — C50.912 MALIGNANT NEOPLASM OF UNSPECIFIED SITE OF LEFT FEMALE BREAST: ICD-10-CM

## 2022-02-02 LAB
ALBUMIN SERPL ELPH-MCNC: 3.4 G/DL — SIGNIFICANT CHANGE UP (ref 3.3–5)
ALP SERPL-CCNC: 83 U/L — SIGNIFICANT CHANGE UP (ref 40–120)
ALT FLD-CCNC: 25 U/L — SIGNIFICANT CHANGE UP (ref 10–45)
ANION GAP SERPL CALC-SCNC: 9 MMOL/L — SIGNIFICANT CHANGE UP (ref 5–17)
AST SERPL-CCNC: 27 U/L — SIGNIFICANT CHANGE UP (ref 10–40)
BILIRUB SERPL-MCNC: 1 MG/DL — SIGNIFICANT CHANGE UP (ref 0.2–1.2)
BUN SERPL-MCNC: 12 MG/DL — SIGNIFICANT CHANGE UP (ref 7–23)
CALCIUM SERPL-MCNC: 10.4 MG/DL — SIGNIFICANT CHANGE UP (ref 8.4–10.5)
CHLORIDE SERPL-SCNC: 105 MMOL/L — SIGNIFICANT CHANGE UP (ref 96–108)
CO2 SERPL-SCNC: 31 MMOL/L — SIGNIFICANT CHANGE UP (ref 22–31)
CREAT SERPL-MCNC: 0.7 MG/DL — SIGNIFICANT CHANGE UP (ref 0.5–1.3)
GLUCOSE SERPL-MCNC: 138 MG/DL — HIGH (ref 70–99)
HCT VFR BLD CALC: 37.4 % — SIGNIFICANT CHANGE UP (ref 34.5–45)
HGB BLD-MCNC: 11.9 G/DL — SIGNIFICANT CHANGE UP (ref 11.5–15.5)
LYMPHOCYTES # BLD AUTO: 3.1 K/UL — SIGNIFICANT CHANGE UP (ref 1–3.3)
LYMPHOCYTES # BLD AUTO: 51.7 % — HIGH (ref 13–44)
MCHC RBC-ENTMCNC: 27.7 PG — SIGNIFICANT CHANGE UP (ref 27–34)
MCHC RBC-ENTMCNC: 31.8 GM/DL — LOW (ref 32–36)
MCV RBC AUTO: 87 FL — SIGNIFICANT CHANGE UP (ref 80–100)
NEUTROPHILS # BLD AUTO: 2.5 K/UL — SIGNIFICANT CHANGE UP (ref 1.8–7.4)
NEUTROPHILS NFR BLD AUTO: 42 % — LOW (ref 43–77)
PLATELET # BLD AUTO: 258 K/UL — SIGNIFICANT CHANGE UP (ref 150–400)
POTASSIUM SERPL-MCNC: 3.5 MMOL/L — SIGNIFICANT CHANGE UP (ref 3.5–5.3)
POTASSIUM SERPL-SCNC: 3.5 MMOL/L — SIGNIFICANT CHANGE UP (ref 3.5–5.3)
PROT SERPL-MCNC: 7.6 G/DL — SIGNIFICANT CHANGE UP (ref 6–8.3)
RBC # BLD: 4.3 M/UL — SIGNIFICANT CHANGE UP (ref 3.8–5.2)
RBC # FLD: 13.4 % — SIGNIFICANT CHANGE UP (ref 10.3–14.5)
SODIUM SERPL-SCNC: 145 MMOL/L — SIGNIFICANT CHANGE UP (ref 135–145)
WBC # BLD: 6 K/UL — SIGNIFICANT CHANGE UP (ref 3.8–10.5)
WBC # FLD AUTO: 6 K/UL — SIGNIFICANT CHANGE UP (ref 3.8–10.5)

## 2022-02-02 PROCEDURE — 85025 COMPLETE CBC W/AUTO DIFF WBC: CPT

## 2022-02-02 PROCEDURE — 36415 COLL VENOUS BLD VENIPUNCTURE: CPT

## 2022-02-02 PROCEDURE — 80053 COMPREHEN METABOLIC PANEL: CPT

## 2022-02-02 PROCEDURE — 96413 CHEMO IV INFUSION 1 HR: CPT

## 2022-02-02 RX ORDER — ADO-TRASTUZUMAB EMTANSINE 20 MG/ML
252 INJECTION, POWDER, LYOPHILIZED, FOR SOLUTION INTRAVENOUS ONCE
Refills: 0 | Status: COMPLETED | OUTPATIENT
Start: 2022-02-02 | End: 2022-02-02

## 2022-02-02 RX ADMIN — ADO-TRASTUZUMAB EMTANSINE 525.2 MILLIGRAM(S): 20 INJECTION, POWDER, LYOPHILIZED, FOR SOLUTION INTRAVENOUS at 12:20

## 2022-02-02 RX ADMIN — ADO-TRASTUZUMAB EMTANSINE 252 MILLIGRAM(S): 20 INJECTION, POWDER, LYOPHILIZED, FOR SOLUTION INTRAVENOUS at 12:50

## 2022-02-11 ENCOUNTER — NON-APPOINTMENT (OUTPATIENT)
Age: 62
End: 2022-02-11

## 2022-02-17 ENCOUNTER — NON-APPOINTMENT (OUTPATIENT)
Age: 62
End: 2022-02-17

## 2022-02-22 NOTE — HISTORY OF PRESENT ILLNESS
[FreeTextEntry1] : Ms. Brigida Pedroza is a 61 year old female diagnosed with cT2N0 invasive breast cancer, status post marcela-adjuvant chemotherapy with TCHP 6/6, status post Left sided lumpectomy on 11/19/21 revealing LEFT breast 9mm poorly differentiated IDC, (ER-, AR-, HER2+), [id number], Stage 1A, with definitive response to presurgical chemo with negative margins and neg SLNBx (0/2). She had declined genetic testing. She plans to continue Herceptin/Perjeta q3 weeks for 17 cycles. \par \par 5/6/21: Julián DXMG & US ([facility]): heterogeneously dense, L - 2.6cm ill-defined mass in reigon of palpable concern, inferior areas of asymmetry which efface, US - L - 2.9cm ill-defined hypoechoic mass 2:00 5FN, 0.2cm complicated cyst 11:00 6FN. BIRADS 5.\par 5/12/21: L US Guided bx x2 ([facility]): "R shaped clip" L 2:00 15FN - 1.2cm invasive ductal carcinoma w/ assoc chronic inflammatory infiltrate, poorly differentiated , ER (-), AR (-), HER2 Equivocal, FISH (+) L 1.1cm axillary LN 18FN - "S Shaped Clip" reactive LN, negative for carcinoma \par 6/18/2021 ([facility]) bilateral breast MRI showing 1. The index malignancy in the 2:00 axis of the left breast measures 3.4 cm x 2.3 cm x 2.4 cm. 2. A 3 mm focus of enhancement in the 5:00 axis of the left breast posterior depth is suspicious for malignancy. MR guided core needle biopsy is recommended.\par 3. Incidentally seen is a 2.4 cm T2 bright lesion in the posterior right hepatic dome. Abdominal ultrasound is recommended. MRI guided biopsy of left 5:00 axis recommended. BIRADS 4\par \par She was placed on marcela-adjuvant chemotherapy with TCHP 6/6 with Dr. Fontenot, completed on 10/13/21.\par \par 10/19/2021 ([facility]) bilateral breast MRI 1. Post neoadjuvant chemotherapy, significant decreased in size of index lesion, left breast 2:00 axis, residual foci of enhancement measuring in total 1.3 x 0.8 cm surrounding the biopsy clip. 2. Interval resolution of the left breast 5:00 axis suspicious focus of enhancement. 3. T2 hyperintense lesion in the right hepatic dome, likely hepatic cyst. No change. 4. No MRI evidence of right breast malignancy. \par \par 11/19/2021 ([facility] pathology) left breast UOQ lumpectomy pathology: Breast, left, upper outer; lumpectomy:\par Final Diagnosis   (Stage [id number], Negative margin)\par 1. Breast, left, upper outer; lumpectomy: - Invasive ductal carcinoma, poorly differentiated, with definitive response to presurgical therapy, see synoptic report- Invasive carcinoma measures 9 mm in largest extent - Margins, as present in this specimen:\par Posterior: positive, for final posterior margin see part 6     Anterior: 1 mm Medial: 2 mm, for final medial margin see part 3   All remaining margins: over 5 mm  - Biopsy site changes   - Calcifications associated with wall of blood vessels\par 2. Breast, left, superior margin; excision:  - Benign breast tissue\par 3. Breast, left, medial margin; excision: - Benign breast tissue\par 4. Breast, left, inferior margin; excision: - Benign breast tissue\par 5. Breast, left, lateral margin; excision: - Benign breast tissue with changes of prior procedure and portion of tumor bed area\par 6. Breast, left, deep margin; excision: - Benign fibroadipose tissue and skeletal muscle\par 7. Axilla, left, non-sentinel nodes; biopsy: - One lymph node negative for metastatic carcinoma (0/1)\par 8. Axilla, left, sentinel node; biopsy: - One lymph node negative for metastatic carcinoma (0/1)  - Biopsy site changes\par Note: Repeat testing for ER, AR and HER-2 will be reported in an addendum.\par Verified by: Diamond Barfield M.D.\par (Electronic Signature)\par Reported on: 11/23/21 14:21 EST, [facility], [address],\par [place] [id number]\par Phone: [phone] Fax: [phone]\par \par 12/28/2021 Consultation @ [facility] - Overall, the patient notes that she is healing well from surgery. She denies a history of radiation therapy. She lives in [place], and expressed some anxiety related to transportation for treatment and was offered a consultation and treatment closer to home.\par \par 3/20 fractions of radiation to left breast (prone) completed. No changes from preRT baseline. No cough, SOB, chest pain, skin reactions.\par

## 2022-02-22 NOTE — REVIEW OF SYSTEMS
Subjective       History of Present Illness    Chief Complaint   Patient presents with   • Gynecologic Exam     Pt had MG today       Brandy El is a 54 y.o. female who presents for annual exam. No problems. Her sister was diagnosed with breast cancer last year but doing very well. It was hormone receptor +.   She doesn't think she had genetic testing but will check with her. She Had single mastectomy and reconstruction. Patient has no complaints, no vaginal bleeding or bowel or bladder problems.  Did mammogram this morning.    OB History    Para Term  AB Living   3 3 3      SAB TAB Ectopic Multiple Live Births             # Outcome Date GA Lbr Gabino/2nd Weight Sex Delivery Anes PTL Lv   3 Term            2 Term            1 Term                   The following portions of the patient's history were reviewed and updated as appropriate: allergies, current medications, past family history, past medical history, past social history, past surgical history and problem list.      Current contraception: post menopausal status  History of abnormal Pap smear: yes - ASCUS (HPV negative)  Received Gardasil immunization: no  Perform regular self breast exam: yes - monthly  Family history of uterine or ovarian cancer: no  Family History of colon cancer: no  Family history of breast cancer: yes - sister at 70, maternal aunt 70    Mammogram: done today.  Colonoscopy: up to date. Had a polyp removed  DEXA: up to date.  Last Pap:    Smoking status: Never Smoker                                                                 Smokeless status: Not on file                       Exercise: moderately active  Calcium/Vitamin D: adequate intake    The following portions of the patient's history were reviewed and updated as appropriate: allergies, current medications, past family history, past medical history, past social history, past surgical history and problem list.    Review of Systems   Constitutional: Negative.   "  HENT: Negative.    Eyes: Negative.    Respiratory: Negative.    Cardiovascular: Negative.    Gastrointestinal: Negative.    Endocrine: Negative.    Genitourinary: Negative.    Musculoskeletal: Negative.    Skin: Negative.    Allergic/Immunologic: Negative.    Neurological: Negative.    Hematological: Negative.    Psychiatric/Behavioral: Negative.          Objective   Physical Exam   Constitutional: She is oriented to person, place, and time. She appears well-developed and well-nourished.   Neck: No thyroid mass present.   Cardiovascular: Normal rate, regular rhythm and normal heart sounds.    Pulmonary/Chest: Effort normal and breath sounds normal. Right breast exhibits no mass, no nipple discharge, no skin change and no tenderness. Left breast exhibits no mass, no nipple discharge, no skin change and no tenderness.   Abdominal: Soft. There is no tenderness.   Genitourinary: Rectum normal, vagina normal and uterus normal. Rectal exam shows no mass and guaiac negative stool. There is no rash or lesion on the right labia. There is no rash or lesion on the left labia. Cervix exhibits no motion tenderness, no discharge and no friability. Right adnexum displays no mass and no tenderness. Left adnexum displays no mass and no tenderness.   Neurological: She is alert and oriented to person, place, and time.   Psychiatric: She has a normal mood and affect. Her behavior is normal.   Vitals reviewed.      /80  Ht 63\" (160 cm)  Wt 160 lb (72.6 kg)  BMI 28.34 kg/m2    Assessment/Plan   Brandy was seen today for gynecologic exam.    Diagnoses and all orders for this visit:    Encounter for gynecological examination  -     IGP,rfx Aptima HPV All Pth - ThinPrep Vial, Cervix; Future      Breast self exam technique reviewed and patient encouraged to perform self-exam monthly.  Discussed healthy lifestyle modifications.  Pap smear done today per patient request  Recommended 30 minutes of aerobic exercise five times per " week.  Discussed calcium needs to prevent osteoporosis  Discussed BRCA testing, patient declines at this time.          [Dysphagia: Grade 0] : Dysphagia: Grade 0 [Cough: Grade 0] : Cough: Grade 0 [Alopecia: Grade 0] : Alopecia: Grade 0 [Pruritus: Grade 0] : Pruritus: Grade 0 [Skin Atrophy: Grade 0] : Skin Atrophy: Grade 0 [Skin Hyperpigmentation: Grade 0] : Skin Hyperpigmentation: Grade 0 [Skin Induration: Grade 0] : Skin Induration: Grade 0 [Dermatitis Radiation: Grade 0] : Dermatitis Radiation: Grade 0 [Negative] : Allergic/Immunologic [FreeTextEntry5] : HTN [de-identified] : left lumpectomy for breast cancer

## 2022-02-22 NOTE — DISEASE MANAGEMENT
[Pathological] : TNM Stage: p [IA] : IA [TTNM] : 1b [NTNM] : 0 [MTNM] : 0 [de-identified] : 5240 cGy [de-identified] : left breast

## 2022-02-23 ENCOUNTER — APPOINTMENT (OUTPATIENT)
Age: 62
End: 2022-02-23

## 2022-02-23 ENCOUNTER — APPOINTMENT (OUTPATIENT)
Dept: HEMATOLOGY ONCOLOGY | Facility: CLINIC | Age: 62
End: 2022-02-23

## 2022-02-24 ENCOUNTER — NON-APPOINTMENT (OUTPATIENT)
Age: 62
End: 2022-02-24

## 2022-03-01 NOTE — REVIEW OF SYSTEMS
[Dysphagia: Grade 0] : Dysphagia: Grade 0 [Cough: Grade 0] : Cough: Grade 0 [Alopecia: Grade 0] : Alopecia: Grade 0 [Pruritus: Grade 0] : Pruritus: Grade 0 [Skin Atrophy: Grade 0] : Skin Atrophy: Grade 0 [Skin Hyperpigmentation: Grade 1 - Hyperpigmentation covering <10% BSA; no psychosocial impact] : Skin Hyperpigmentation: Grade 1 - Hyperpigmentation covering <10% BSA; no psychosocial impact [Skin Induration: Grade 0] : Skin Induration: Grade 0 [Dermatitis Radiation: Grade 1 - Faint erythema or dry desquamation] : Dermatitis Radiation: Grade 1 - Faint erythema or dry desquamation [Negative] : Allergic/Immunologic [FreeTextEntry5] : HTN [de-identified] : left lumpectomy for breast cancer

## 2022-03-01 NOTE — HISTORY OF PRESENT ILLNESS
[FreeTextEntry1] : Ms. Brigida Pedroza is a 61 year old female diagnosed with cT2N0 invasive breast cancer, status post marcela-adjuvant chemotherapy with TCHP 6/6, status post Left sided lumpectomy on 11/19/21 revealing LEFT breast 9mm poorly differentiated IDC, (ER-, CT-, HER2+), [id number], Stage 1A, with definitive response to presurgical chemo with negative margins and neg SLNBx (0/2). She had declined genetic testing. She plans to continue Herceptin/Perjeta q3 weeks for 17 cycles. \par \par 5/6/21: Julián DXMG & US ([facility]): heterogeneously dense, L - 2.6cm ill-defined mass in reigon of palpable concern, inferior areas of asymmetry which efface, US - L - 2.9cm ill-defined hypoechoic mass 2:00 5FN, 0.2cm complicated cyst 11:00 6FN. BIRADS 5.\par 5/12/21: L US Guided bx x2 ([facility]): "R shaped clip" L 2:00 15FN - 1.2cm invasive ductal carcinoma w/ assoc chronic inflammatory infiltrate, poorly differentiated , ER (-), CT (-), HER2 Equivocal, FISH (+) L 1.1cm axillary LN 18FN - "S Shaped Clip" reactive LN, negative for carcinoma \par 6/18/2021 ([facility]) bilateral breast MRI showing 1. The index malignancy in the 2:00 axis of the left breast measures 3.4 cm x 2.3 cm x 2.4 cm. 2. A 3 mm focus of enhancement in the 5:00 axis of the left breast posterior depth is suspicious for malignancy. MR guided core needle biopsy is recommended.\par 3. Incidentally seen is a 2.4 cm T2 bright lesion in the posterior right hepatic dome. Abdominal ultrasound is recommended. MRI guided biopsy of left 5:00 axis recommended. BIRADS 4\par \par She was placed on marcela-adjuvant chemotherapy with TCHP 6/6 with Dr. Fontenot, completed on 10/13/21.\par \par 10/19/2021 ([facility]) bilateral breast MRI 1. Post neoadjuvant chemotherapy, significant decreased in size of index lesion, left breast 2:00 axis, residual foci of enhancement measuring in total 1.3 x 0.8 cm surrounding the biopsy clip. 2. Interval resolution of the left breast 5:00 axis suspicious focus of enhancement. 3. T2 hyperintense lesion in the right hepatic dome, likely hepatic cyst. No change. 4. No MRI evidence of right breast malignancy. \par \par 11/19/2021 ([facility] pathology) left breast UOQ lumpectomy pathology: Breast, left, upper outer; lumpectomy:\par Final Diagnosis   (Stage [id number], Negative margin)\par 1. Breast, left, upper outer; lumpectomy: - Invasive ductal carcinoma, poorly differentiated, with definitive response to presurgical therapy, see synoptic report- Invasive carcinoma measures 9 mm in largest extent - Margins, as present in this specimen:\par Posterior: positive, for final posterior margin see part 6     Anterior: 1 mm Medial: 2 mm, for final medial margin see part 3   All remaining margins: over 5 mm  - Biopsy site changes   - Calcifications associated with wall of blood vessels\par 2. Breast, left, superior margin; excision:  - Benign breast tissue\par 3. Breast, left, medial margin; excision: - Benign breast tissue\par 4. Breast, left, inferior margin; excision: - Benign breast tissue\par 5. Breast, left, lateral margin; excision: - Benign breast tissue with changes of prior procedure and portion of tumor bed area\par 6. Breast, left, deep margin; excision: - Benign fibroadipose tissue and skeletal muscle\par 7. Axilla, left, non-sentinel nodes; biopsy: - One lymph node negative for metastatic carcinoma (0/1)\par 8. Axilla, left, sentinel node; biopsy: - One lymph node negative for metastatic carcinoma (0/1)  - Biopsy site changes\par Note: Repeat testing for ER, CT and HER-2 will be reported in an addendum.\par Verified by: Diamond Barfield M.D.\par (Electronic Signature)\par Reported on: 11/23/21 14:21 EST, [facility], [address],\par [place] [id number]\par Phone: [phone] Fax: [phone]\par \par 12/28/2021 Consultation @ [facility] - Overall, the patient notes that she is healing well from surgery. She denies a history of radiation therapy. She lives in [place], and expressed some anxiety related to transportation for treatment and was offered a consultation and treatment closer to home.\par \par 7/20 fractions of radiation to left breast (prone) completed. Using skin cream over RT area for minor skin toxicity. No cough, SOB, chest pain\par

## 2022-03-01 NOTE — DISEASE MANAGEMENT
[Pathological] : TNM Stage: p [IA] : IA [TTNM] : 1b [NTNM] : 0 [MTNM] : 0 [de-identified] : 5240 cGy [de-identified] : left breast

## 2022-03-03 ENCOUNTER — NON-APPOINTMENT (OUTPATIENT)
Age: 62
End: 2022-03-03

## 2022-03-07 NOTE — HISTORY OF PRESENT ILLNESS
[FreeTextEntry1] : Ms. Brigida Pedroza is a 61 year old female diagnosed with cT2N0 invasive breast cancer, status post marcela-adjuvant chemotherapy with TCHP 6/6, status post Left sided lumpectomy on 11/19/21 revealing LEFT breast 9mm poorly differentiated IDC, (ER-, NC-, HER2+), [id number], Stage 1A, with definitive response to presurgical chemo with negative margins and neg SLNBx (0/2). She had declined genetic testing. She plans to continue Herceptin/Perjeta q3 weeks for 17 cycles. \par \par 5/6/21: Julián DXMG & US ([facility]): heterogeneously dense, L - 2.6cm ill-defined mass in reigon of palpable concern, inferior areas of asymmetry which efface, US - L - 2.9cm ill-defined hypoechoic mass 2:00 5FN, 0.2cm complicated cyst 11:00 6FN. BIRADS 5.\par 5/12/21: L US Guided bx x2 ([facility]): "R shaped clip" L 2:00 15FN - 1.2cm invasive ductal carcinoma w/ assoc chronic inflammatory infiltrate, poorly differentiated , ER (-), NC (-), HER2 Equivocal, FISH (+) L 1.1cm axillary LN 18FN - "S Shaped Clip" reactive LN, negative for carcinoma \par 6/18/2021 ([facility]) bilateral breast MRI showing 1. The index malignancy in the 2:00 axis of the left breast measures 3.4 cm x 2.3 cm x 2.4 cm. 2. A 3 mm focus of enhancement in the 5:00 axis of the left breast posterior depth is suspicious for malignancy. MR guided core needle biopsy is recommended.\par 3. Incidentally seen is a 2.4 cm T2 bright lesion in the posterior right hepatic dome. Abdominal ultrasound is recommended. MRI guided biopsy of left 5:00 axis recommended. BIRADS 4\par \par She was placed on marcela-adjuvant chemotherapy with TCHP 6/6 with Dr. Fontenot, completed on 10/13/21.\par \par 10/19/2021 ([facility]) bilateral breast MRI 1. Post neoadjuvant chemotherapy, significant decreased in size of index lesion, left breast 2:00 axis, residual foci of enhancement measuring in total 1.3 x 0.8 cm surrounding the biopsy clip. 2. Interval resolution of the left breast 5:00 axis suspicious focus of enhancement. 3. T2 hyperintense lesion in the right hepatic dome, likely hepatic cyst. No change. 4. No MRI evidence of right breast malignancy. \par \par 11/19/2021 ([facility] pathology) left breast UOQ lumpectomy pathology: Breast, left, upper outer; lumpectomy:\par Final Diagnosis   (Stage [id number], Negative margin)\par 1. Breast, left, upper outer; lumpectomy: - Invasive ductal carcinoma, poorly differentiated, with definitive response to presurgical therapy, see synoptic report- Invasive carcinoma measures 9 mm in largest extent - Margins, as present in this specimen:\par Posterior: positive, for final posterior margin see part 6     Anterior: 1 mm Medial: 2 mm, for final medial margin see part 3   All remaining margins: over 5 mm  - Biopsy site changes   - Calcifications associated with wall of blood vessels\par 2. Breast, left, superior margin; excision:  - Benign breast tissue\par 3. Breast, left, medial margin; excision: - Benign breast tissue\par 4. Breast, left, inferior margin; excision: - Benign breast tissue\par 5. Breast, left, lateral margin; excision: - Benign breast tissue with changes of prior procedure and portion of tumor bed area\par 6. Breast, left, deep margin; excision: - Benign fibroadipose tissue and skeletal muscle\par 7. Axilla, left, non-sentinel nodes; biopsy: - One lymph node negative for metastatic carcinoma (0/1)\par 8. Axilla, left, sentinel node; biopsy: - One lymph node negative for metastatic carcinoma (0/1)  - Biopsy site changes\par Note: Repeat testing for ER, NC and HER-2 will be reported in an addendum.\par Verified by: Diamond Barfield M.D.\par (Electronic Signature)\par Reported on: 11/23/21 14:21 EST, [facility], [address],\par [place] [id number]\par Phone: [phone] Fax: [phone]\par \par 12/28/2021 Consultation @ [facility] - Overall, the patient notes that she is healing well from surgery. She denies a history of radiation therapy. She lives in [place], and expressed some anxiety related to transportation for treatment and was offered a consultation and treatment closer to home.\par \par 12/20 fractions of radiation to left breast (prone) completed. Using skin cream over RT area for minor skin toxicity. No cough, SOB, chest pain\par

## 2022-03-07 NOTE — DISEASE MANAGEMENT
[Pathological] : TNM Stage: p [IA] : IA [TTNM] : 1b [NTNM] : 0 [MTNM] : 0 [de-identified] : 5240 cGy [de-identified] : left breast

## 2022-03-07 NOTE — REVIEW OF SYSTEMS
[Dysphagia: Grade 0] : Dysphagia: Grade 0 [Cough: Grade 0] : Cough: Grade 0 [Alopecia: Grade 0] : Alopecia: Grade 0 [Pruritus: Grade 0] : Pruritus: Grade 0 [Skin Atrophy: Grade 0] : Skin Atrophy: Grade 0 [Skin Hyperpigmentation: Grade 1 - Hyperpigmentation covering <10% BSA; no psychosocial impact] : Skin Hyperpigmentation: Grade 1 - Hyperpigmentation covering <10% BSA; no psychosocial impact [Skin Induration: Grade 0] : Skin Induration: Grade 0 [Dermatitis Radiation: Grade 1 - Faint erythema or dry desquamation] : Dermatitis Radiation: Grade 1 - Faint erythema or dry desquamation [Negative] : Allergic/Immunologic [FreeTextEntry5] : HTN [de-identified] : left lumpectomy for breast cancer

## 2022-03-14 ENCOUNTER — FORM ENCOUNTER (OUTPATIENT)
Age: 62
End: 2022-03-14

## 2022-03-15 ENCOUNTER — NON-APPOINTMENT (OUTPATIENT)
Age: 62
End: 2022-03-15

## 2022-03-21 ENCOUNTER — NON-APPOINTMENT (OUTPATIENT)
Age: 62
End: 2022-03-21

## 2022-03-21 NOTE — REVIEW OF SYSTEMS
[Dysphagia: Grade 0] : Dysphagia: Grade 0 [Cough: Grade 0] : Cough: Grade 0 [Alopecia: Grade 0] : Alopecia: Grade 0 [Pruritus: Grade 0] : Pruritus: Grade 0 [Skin Atrophy: Grade 0] : Skin Atrophy: Grade 0 [Skin Hyperpigmentation: Grade 1 - Hyperpigmentation covering <10% BSA; no psychosocial impact] : Skin Hyperpigmentation: Grade 1 - Hyperpigmentation covering <10% BSA; no psychosocial impact [Skin Induration: Grade 0] : Skin Induration: Grade 0 [Dermatitis Radiation: Grade 1 - Faint erythema or dry desquamation] : Dermatitis Radiation: Grade 1 - Faint erythema or dry desquamation [Negative] : Allergic/Immunologic [FreeTextEntry5] : HTN [de-identified] : left lumpectomy for breast cancer

## 2022-03-21 NOTE — DISEASE MANAGEMENT
[Pathological] : TNM Stage: p [IA] : IA [TTNM] : 1b [NTNM] : 0 [MTNM] : 0 [de-identified] : 5240 cGy [de-identified] : left breast

## 2022-03-21 NOTE — HISTORY OF PRESENT ILLNESS
[FreeTextEntry1] : Ms. Brigida Pedroza is a 61 year old female diagnosed with cT2N0 invasive breast cancer, status post marcela-adjuvant chemotherapy with TCHP 6/6, status post Left sided lumpectomy on 11/19/21 revealing LEFT breast 9mm poorly differentiated IDC, (ER-, TN-, HER2+), [id number], Stage 1A, with definitive response to presurgical chemo with negative margins and neg SLNBx (0/2). She had declined genetic testing. She plans to continue Herceptin/Perjeta q3 weeks for 17 cycles. \par \par 5/6/21: Julián DXMG & US ([facility]): heterogeneously dense, L - 2.6cm ill-defined mass in reigon of palpable concern, inferior areas of asymmetry which efface, US - L - 2.9cm ill-defined hypoechoic mass 2:00 5FN, 0.2cm complicated cyst 11:00 6FN. BIRADS 5.\par 5/12/21: L US Guided bx x2 ([facility]): "R shaped clip" L 2:00 15FN - 1.2cm invasive ductal carcinoma w/ assoc chronic inflammatory infiltrate, poorly differentiated , ER (-), TN (-), HER2 Equivocal, FISH (+) L 1.1cm axillary LN 18FN - "S Shaped Clip" reactive LN, negative for carcinoma \par 6/18/2021 ([facility]) bilateral breast MRI showing 1. The index malignancy in the 2:00 axis of the left breast measures 3.4 cm x 2.3 cm x 2.4 cm. 2. A 3 mm focus of enhancement in the 5:00 axis of the left breast posterior depth is suspicious for malignancy. MR guided core needle biopsy is recommended.\par 3. Incidentally seen is a 2.4 cm T2 bright lesion in the posterior right hepatic dome. Abdominal ultrasound is recommended. MRI guided biopsy of left 5:00 axis recommended. BIRADS 4\par \par She was placed on marcela-adjuvant chemotherapy with TCHP 6/6 with Dr. Fontenot, completed on 10/13/21.\par \par 10/19/2021 ([facility]) bilateral breast MRI 1. Post neoadjuvant chemotherapy, significant decreased in size of index lesion, left breast 2:00 axis, residual foci of enhancement measuring in total 1.3 x 0.8 cm surrounding the biopsy clip. 2. Interval resolution of the left breast 5:00 axis suspicious focus of enhancement. 3. T2 hyperintense lesion in the right hepatic dome, likely hepatic cyst. No change. 4. No MRI evidence of right breast malignancy. \par \par 11/19/2021 ([facility] pathology) left breast UOQ lumpectomy pathology: Breast, left, upper outer; lumpectomy:\par Final Diagnosis   (Stage [id number], Negative margin)\par 1. Breast, left, upper outer; lumpectomy: - Invasive ductal carcinoma, poorly differentiated, with definitive response to presurgical therapy, see synoptic report- Invasive carcinoma measures 9 mm in largest extent - Margins, as present in this specimen:\par Posterior: positive, for final posterior margin see part 6     Anterior: 1 mm Medial: 2 mm, for final medial margin see part 3   All remaining margins: over 5 mm  - Biopsy site changes   - Calcifications associated with wall of blood vessels\par 2. Breast, left, superior margin; excision:  - Benign breast tissue\par 3. Breast, left, medial margin; excision: - Benign breast tissue\par 4. Breast, left, inferior margin; excision: - Benign breast tissue\par 5. Breast, left, lateral margin; excision: - Benign breast tissue with changes of prior procedure and portion of tumor bed area\par 6. Breast, left, deep margin; excision: - Benign fibroadipose tissue and skeletal muscle\par 7. Axilla, left, non-sentinel nodes; biopsy: - One lymph node negative for metastatic carcinoma (0/1)\par 8. Axilla, left, sentinel node; biopsy: - One lymph node negative for metastatic carcinoma (0/1)  - Biopsy site changes\par Note: Repeat testing for ER, TN and HER-2 will be reported in an addendum.\par Verified by: Diamond Barfield M.D.\par (Electronic Signature)\par Reported on: 11/23/21 14:21 EST, [facility], [address],\par [place] [address]\par Phone: [phone] Fax: [phone]\par \par 12/28/2021 Consultation @ [facility] - Overall, the patient notes that she is healing well from surgery. She denies a history of radiation therapy. She lives in [place], and expressed some anxiety related to transportation for treatment and was offered a consultation and treatment closer to home.\par \par 20/20 fractions of radiation to left breast (prone) completed. Using skin cream over RT area for minor skin toxicity. No cough, SOB, chest pain\par

## 2022-03-23 ENCOUNTER — APPOINTMENT (OUTPATIENT)
Dept: HEMATOLOGY ONCOLOGY | Facility: CLINIC | Age: 62
End: 2022-03-23
Payer: COMMERCIAL

## 2022-03-23 PROCEDURE — 99214 OFFICE O/P EST MOD 30 MIN: CPT | Mod: 95

## 2022-03-23 NOTE — HISTORY OF PRESENT ILLNESS
[Home] : at home, [unfilled] , at the time of the visit. [Medical Office: ([facility])___] : at the medical office located in  [Verbal consent obtained from patient] : the patient, [unfilled] [Disease: _____________________] : Disease: [unfilled] [T: ___] : T[unfilled] [N: ___] : N[unfilled] [de-identified] : 61 year old female presents today for initial consultation of breast cancer, referred by Dr. Shah.  She was sent for diagnostic mammogram for which she felt a left upper quadrant palpalbe mass x 4 months.  \par \par Most recent mammo 5/6/21: showed left 2:00 highly suspicious mass and prominent left axillary lymph node for which an US guided core biopsy was recommended \par \par 5/17/21: left breast US guide core biopsy showed invasive poorly differentiated ductal carcinoma associated with chronic inflammatory infiltrate at the left 12:00 position. The left axillary lymph node biopsy demonstrated a reactive lymph node, negative for carcinoma. These findings are concordant with imaging. ER- MD-, Her 2 amplified FISH. Left axillary LN- reactive on pathology.\par \par \par Risk Factors:\par Age at menarche- 11\par Age at menopause-49\par G6,P6\par Age at first live birth-17\par OCP-denies\par HRT-denies\par Family History-denies\par Genetics-denies\par Smoker-former smoker\par \par 9/22/2021\par Patient cycle 5 TCHP- c/o fatigue, trace of pedal edema. ECHO pending [de-identified] : ER -neg, ME neg, HER 2 amplified  [de-identified] : PAtient seen in telehealth

## 2022-03-23 NOTE — ASSESSMENT
[FreeTextEntry1] : 60 yo AA female referred by Dr. Tello Shah for evaluation of newly diagnosed left breast cancer - at least 2.9 cm, \par IDC, ER neg, MI neg, HER2 patricia IHC equivocal, FISH amplified.  Left axilla LN negative.\par \par Patient accompanied by her . We reviewed the pathobiology of breast cancer, indication for NACT with HER2 targeted treatment.  Patient offered TCHP based on the TRYPHAENA protocol. \par \par Side effects and schema reviewed with patient. \par \par 6/30/2021\par  cycle 1 TCHP- tx plan and schema reviewed with pt and spouse\par ECHO 6/24/2021- LVEF 55%\par labs stable\par \par 7/14/2021\par Cycle 1, day 14\par Nausea - required meds day 3\par Insomnia\par Pelvic pain \par Diarrhea 3 x per day, loose BM -1-2 per day now\par Tingling sensation in fingers \par Pain meds prescribed - tramadol\par Stressed that if no appetite push liquids, and if no tolerance come IV fluids \par  return in 1 week for tx\par \par 7/21/2021\par TCHP # 2- day 1\par - patient feels better, mild pedal edema.\par - tachycardia- baseline - regular\par - BP - she takes amlodipine 2.5 mg - might contribute to swelling, fluid retention\par - extend dexa 5 mg PO BID x 5 days after treatment to alleviate pain and decrease risk of swelling\par - might benefit from HCTZ, will follow up with PCP\par - reviewed side effects\par \par 8/11/2021\par Patient for cycle 3 TCHP\par felt much better after cycle 2, felt Zyrtec decreased bone pain\par Denies nausea, diarrhea\par Mouth sore resolved, cannot afford Magic Mouthwash- send for viscus lidocaine PRN and will use salt water\par weight back to baseline\par Trace of pedal edema resolved\par \par 9/22/2021\par TCHP cycle 5 today\par Concerned about weakness in legs - leg swelling, start diuretics\par if progression of LE weakness will hold docetaxel with cycle 6\par HTN - start HCTZ\par Patient working from home, feels tired. \par She doesn’t want vaccine for COVID. \par Insomnia - will try melatonin\par ECHO -report pending \par \par 10/13/2021\par IDC T2N0 on neoadjuvant chemotherapy\par Excellent clinical response\par Patient for TCHP  6/6, \par She is happy she is finishing chemotherapy part.\par C/o weakness in the legs and neuropathy - referred to PT and hold Docetaxel last dose\par Explained plan for MRI, surgical follow up with Dr. Shah \par \par Plan to continue Trastuzumab and pertuzumab q 3 weeks until surgery.\par \par Following surgery if there is complete pathological response patient will continue trastuzumab and pertuzumab every 3 weeks for total of 17 cycles ( including neoadjuvant treatment) .\par If there is no pCR patient will be changed to TDM-1 for 14 cycles\par \par 12/22/2021\par 11/19/2021 ([facility]) left breast UOQ lumpectomy pathology: Breast, left, upper outer; lumpectomy:\par - Invasive ductal carcinoma, poorly differentiated, with definitive\par response to presurgical therapy, see synoptic report\par - Invasive carcinoma measures 9 mm in largest extent\par - Margins, as present in this specimen: Posterior: positive, for final posterior margin see part 6\par Anterior: 1 mm\par Medial: 2 mm, for final medial margin see part 3\par All remaining margins: over 5 mm\par - Biopsy site changes\par - Calcifications associated with wall of blood vessels\par SLNB pathology: 7. Axilla, left, non-sentinel nodes; biopsy:\par - One lymph node negative for metastatic carcinoma (0/1) 8. Axilla, left, sentinel node; biopsy: - One lymph node negative for metastatic carcinoma (0/1) - Biopsy site changes. \par -Pt here for Kadcyla #2- tolerating well \par -due for ECHO\par - Apt with Dr. Lombardo 12/28 for RT consult\par -ongoing weight loss- refer to nutritionist- defers any medication to stimulate appetite    \par - labs reviewed\par \par Kadcyla # 3/14 1/12/22\par Patient with neuropathy - LE, refer to neurologist for evaluation, referred to PT, OT.  Tingling sensation in LE, hands - feels less agile to draw. \par Option of treatment is fam-trastuzumab deruxtecan as patient high risk of recurrence and didn’t have CR after TCHP. There is reported neuropathy with Kadcyla in 21% and no evidence of neuropathy with Enhertu.\par patient went through COVID during Christmas, recovered well. ( declined vaccination) \par \par Patient completed RT, 5240 cGy 3/22\par \par Patient c/o tingling of throbbing sensation in feet and toes, muscle and bones achy.  Fingers affected as well. \par Whenever had TMD-1 administer the pain increased.  Patient referred to neurologist in the past, hesitant to go.  Explained the benefit from consultation since the degree of her neuropathy is more than usually seen on Docetaxel. My team is assisting her to make appointment.\par She is willing to go back to TDM-1.  She will follow up next week for tx 3/30/2022 4/14\par ECHO - Jan 31/ 2022- no breathing issues.\par Patient concerned about financial burden of the treatment. Referred for financial assistance.\par \par  check cbc and chem,

## 2022-03-23 NOTE — PHYSICAL EXAM
[Fully active, able to carry on all pre-disease performance without restriction] : Status 0 - Fully active, able to carry on all pre-disease performance without restriction [Normal] : affect appropriate [de-identified] : s/p lumpectomy, healing well [de-identified] : trace pedal edema

## 2022-03-23 NOTE — REVIEW OF SYSTEMS
[Fatigue] : fatigue [Recent Change In Weight] : ~T recent weight change [Anxiety] : anxiety [Negative] : Allergic/Immunologic [de-identified] : neuropathy - ongoing but stable

## 2022-03-24 ENCOUNTER — NON-APPOINTMENT (OUTPATIENT)
Age: 62
End: 2022-03-24

## 2022-03-30 ENCOUNTER — OUTPATIENT (OUTPATIENT)
Dept: OUTPATIENT SERVICES | Facility: HOSPITAL | Age: 62
LOS: 1 days | End: 2022-03-30
Payer: COMMERCIAL

## 2022-03-30 ENCOUNTER — APPOINTMENT (OUTPATIENT)
Age: 62
End: 2022-03-30

## 2022-03-30 VITALS
HEART RATE: 78 BPM | DIASTOLIC BLOOD PRESSURE: 73 MMHG | SYSTOLIC BLOOD PRESSURE: 151 MMHG | RESPIRATION RATE: 17 BRPM | TEMPERATURE: 98 F | OXYGEN SATURATION: 97 %

## 2022-03-30 DIAGNOSIS — C50.912 MALIGNANT NEOPLASM OF UNSPECIFIED SITE OF LEFT FEMALE BREAST: ICD-10-CM

## 2022-03-30 LAB
ALBUMIN SERPL ELPH-MCNC: 3.4 G/DL — SIGNIFICANT CHANGE UP (ref 3.3–5)
ALP SERPL-CCNC: 57 U/L — SIGNIFICANT CHANGE UP (ref 40–120)
ALT FLD-CCNC: 24 U/L — SIGNIFICANT CHANGE UP (ref 10–45)
ANION GAP SERPL CALC-SCNC: 7 MMOL/L — SIGNIFICANT CHANGE UP (ref 5–17)
AST SERPL-CCNC: 29 U/L — SIGNIFICANT CHANGE UP (ref 10–40)
BILIRUB SERPL-MCNC: 1.1 MG/DL — SIGNIFICANT CHANGE UP (ref 0.2–1.2)
BUN SERPL-MCNC: 16 MG/DL — SIGNIFICANT CHANGE UP (ref 7–23)
CALCIUM SERPL-MCNC: 10.2 MG/DL — SIGNIFICANT CHANGE UP (ref 8.4–10.5)
CHLORIDE SERPL-SCNC: 109 MMOL/L — HIGH (ref 96–108)
CO2 SERPL-SCNC: 29 MMOL/L — SIGNIFICANT CHANGE UP (ref 22–31)
CREAT SERPL-MCNC: 0.7 MG/DL — SIGNIFICANT CHANGE UP (ref 0.5–1.3)
EGFR: 98 ML/MIN/1.73M2 — SIGNIFICANT CHANGE UP
GLUCOSE SERPL-MCNC: 101 MG/DL — HIGH (ref 70–99)
HCT VFR BLD CALC: 37.1 % — SIGNIFICANT CHANGE UP (ref 34.5–45)
HGB BLD-MCNC: 11.5 G/DL — SIGNIFICANT CHANGE UP (ref 11.5–15.5)
MCHC RBC-ENTMCNC: 26.3 PG — LOW (ref 27–34)
MCHC RBC-ENTMCNC: 31 GM/DL — LOW (ref 32–36)
MCV RBC AUTO: 84.9 FL — SIGNIFICANT CHANGE UP (ref 80–100)
NRBC # BLD: 0 /100 WBCS — SIGNIFICANT CHANGE UP (ref 0–0)
PLATELET # BLD AUTO: 178 K/UL — SIGNIFICANT CHANGE UP (ref 150–400)
POTASSIUM SERPL-MCNC: 4.4 MMOL/L — SIGNIFICANT CHANGE UP (ref 3.5–5.3)
POTASSIUM SERPL-SCNC: 4.4 MMOL/L — SIGNIFICANT CHANGE UP (ref 3.5–5.3)
PROT SERPL-MCNC: 6.9 G/DL — SIGNIFICANT CHANGE UP (ref 6–8.3)
RBC # BLD: 4.37 M/UL — SIGNIFICANT CHANGE UP (ref 3.8–5.2)
RBC # FLD: 14.2 % — SIGNIFICANT CHANGE UP (ref 10.3–14.5)
SARS-COV-2 RNA SPEC QL NAA+PROBE: NEGATIVE — SIGNIFICANT CHANGE UP
SODIUM SERPL-SCNC: 145 MMOL/L — SIGNIFICANT CHANGE UP (ref 135–145)
WBC # BLD: 4.26 K/UL — SIGNIFICANT CHANGE UP (ref 3.8–10.5)
WBC # FLD AUTO: 4.26 K/UL — SIGNIFICANT CHANGE UP (ref 3.8–10.5)

## 2022-03-30 RX ORDER — ADO-TRASTUZUMAB EMTANSINE 20 MG/ML
252 INJECTION, POWDER, LYOPHILIZED, FOR SOLUTION INTRAVENOUS ONCE
Refills: 0 | Status: COMPLETED | OUTPATIENT
Start: 2022-03-30 | End: 2022-03-30

## 2022-03-30 RX ADMIN — ADO-TRASTUZUMAB EMTANSINE 525.2 MILLIGRAM(S): 20 INJECTION, POWDER, LYOPHILIZED, FOR SOLUTION INTRAVENOUS at 12:49

## 2022-03-30 RX ADMIN — ADO-TRASTUZUMAB EMTANSINE 252 MILLIGRAM(S): 20 INJECTION, POWDER, LYOPHILIZED, FOR SOLUTION INTRAVENOUS at 13:20

## 2022-04-07 ENCOUNTER — NON-APPOINTMENT (OUTPATIENT)
Age: 62
End: 2022-04-07

## 2022-04-11 ENCOUNTER — NON-APPOINTMENT (OUTPATIENT)
Age: 62
End: 2022-04-11

## 2022-04-12 ENCOUNTER — APPOINTMENT (OUTPATIENT)
Dept: BREAST CENTER | Facility: CLINIC | Age: 62
End: 2022-04-12
Payer: COMMERCIAL

## 2022-04-14 NOTE — PHYSICAL EXAM
[Supple] : supple [No Supraclavicular Adenopathy] : no supraclavicular adenopathy [No Cervical Adenopathy] : no cervical adenopathy [Examined in the supine and seated position] : examined in the supine and seated position [No dominant masses] : no dominant masses in right breast  [No Nipple Retraction] : no left nipple retraction [No Nipple Discharge] : no left nipple discharge [No Axillary Lymphadenopathy] : no left axillary lymphadenopathy [de-identified] : Incision clean, dry and intact.  Ends of sutures cut. New steristrips applied.\par

## 2022-04-14 NOTE — DATA REVIEWED
[FreeTextEntry1] : 5/6/21: Julián DXMG & US ([facility]): heterogeneously dense, L - 2.6cm ill-defined mass in reigon of palpable concern, inferior areas of asymmetry which efface, US - L - 2.9cm ill-defined hypoechoic mass 2:00 5FN, 0.2cm complicated cyst 11:00 6FN. BIRADS 5.\par 5/12/21: L US Guided bx x2 ([facility]): "R shaped clip" L 2:00 15FN - 1.2cm invasive ductal carcinoma w/ assoc chronic inflammatory infiltrate, poorly differentiated , ER (-), WI (-), HER2 Equivocal, FISH (+) L 1.1cm axillary LN 18FN - "S Shaped Clip" reactive LN, negative for carcinoma \par \par 6/18/2021 ([facility]) bilateral breast MRI showing 1. The index malignancy in the 2:00 axis of the left breast measures 3.4 cm x 2.3 cm x 2.4 cm. 2. A 3 mm focus of enhancement in the 5:00 axis of the left breast posterior depth is suspicious for malignancy. MR guided core needle biopsy is recommended.\par 3. Incidentally seen is a 2.4 cm T2 bright lesion in the posterior right hepatic dome. Abdominal ultrasound is recommended. MRI guided biopsy of left 5:00 axis recommended. BIRADS 4\par \par 10/19/2021 ([facility]) bilateral breast MRI 1. Post neoadjuvant chemotherapy, significant decreased in size of index lesion, left breast 2:00 axis, residual foci of enhancement measuring in total 1.3 x 0.8 cm surrounding the biopsy clip. 2. Interval resolution of the left breast 5:00 axis suspicious focus of enhancement. 3. T2 hyperintense lesion in the right hepatic dome, likely hepatic cyst. No change.\par 4. No MRI evidence of right breast malignancy. \par \par 11/19/2021 ([facility] pathology) left breast UOQ lumpectomy pathology: Breast, left, upper outer; lumpectomy:\par - Invasive ductal carcinoma, poorly differentiated, with definitive response to presurgical therapy, see synoptic report\par - Invasive carcinoma measures 9 mm in largest extent\par - Margins, as present in this specimen: Posterior: positive, for final posterior margin see part 6\par Anterior: 1 mm\par Medial: 2 mm, for final medial margin see part 3\par All remaining margins: over 5 mm\par - Biopsy site changes\par - Calcifications associated with wall of blood vessels\par SLNB pathology: 7. Axilla, left, non-sentinel nodes; biopsy:\par - One lymph node negative for metastatic carcinoma (0/1) 8. Axilla, left, sentinel node; biopsy: - One lymph node negative for metastatic carcinoma (0/1) - Biopsy site changes

## 2022-04-14 NOTE — REASON FOR VISIT
[Follow-Up: _____] : a [unfilled] follow-up visit [FreeTextEntry1] :  left 2:00 0.9cm 5cmFN IDC ER 0%, KS 0%, HER-2 equivocal, FISH (+) s/p L breast lumpectomy 11/19/21 NSLNB (0/1) SLNB (0/1)

## 2022-04-14 NOTE — ASSESSMENT
[FreeTextEntry1] : FROM COPY FORWARD:\par Patient is clear margins from surgery.  Has done well from the procedure.  Is to continue systemic therapy.  I will see her back in 3 months and repeat her sozo.  She also needs a radiation oncology consultation.

## 2022-04-14 NOTE — HISTORY OF PRESENT ILLNESS
[FreeTextEntry1] : 60 yo female presents for follow-up of left 2:00 5cmFN IDC ER 0%, DE 0%, HER-2 equivocal, FISH positive s/p L breast upper outer lumpectomy on 11/19/21 path showing poorly differentiated IDC measuring .9 cm with definitive response to presurgical therapy, NSLNB (0/1) SLNB (0/1). Of note, positive family history but patient has declined genetic testing.\par \par Finished with TCHP on 10/12/2021 with Dr. Fontenot\par Continuing Herceptin/Perjeta [id number]; treated yesterday.  Feels a bit tired.\par Patient is feeling well?\par No major complaints?

## 2022-04-20 ENCOUNTER — OUTPATIENT (OUTPATIENT)
Dept: OUTPATIENT SERVICES | Facility: HOSPITAL | Age: 62
LOS: 1 days | End: 2022-04-20
Payer: COMMERCIAL

## 2022-04-20 ENCOUNTER — APPOINTMENT (OUTPATIENT)
Age: 62
End: 2022-04-20

## 2022-04-20 VITALS
OXYGEN SATURATION: 98 % | SYSTOLIC BLOOD PRESSURE: 147 MMHG | TEMPERATURE: 98 F | DIASTOLIC BLOOD PRESSURE: 79 MMHG | HEART RATE: 73 BPM | RESPIRATION RATE: 18 BRPM

## 2022-04-20 DIAGNOSIS — C50.912 MALIGNANT NEOPLASM OF UNSPECIFIED SITE OF LEFT FEMALE BREAST: ICD-10-CM

## 2022-04-20 LAB
ALBUMIN SERPL ELPH-MCNC: 3.1 G/DL — LOW (ref 3.3–5)
ALP SERPL-CCNC: 73 U/L — SIGNIFICANT CHANGE UP (ref 40–120)
ALT FLD-CCNC: 21 U/L — SIGNIFICANT CHANGE UP (ref 10–45)
ANION GAP SERPL CALC-SCNC: 5 MMOL/L — SIGNIFICANT CHANGE UP (ref 5–17)
AST SERPL-CCNC: 28 U/L — SIGNIFICANT CHANGE UP (ref 10–40)
BILIRUB SERPL-MCNC: 0.9 MG/DL — SIGNIFICANT CHANGE UP (ref 0.2–1.2)
BUN SERPL-MCNC: 14 MG/DL — SIGNIFICANT CHANGE UP (ref 7–23)
CALCIUM SERPL-MCNC: 9.6 MG/DL — SIGNIFICANT CHANGE UP (ref 8.4–10.5)
CHLORIDE SERPL-SCNC: 108 MMOL/L — SIGNIFICANT CHANGE UP (ref 96–108)
CO2 SERPL-SCNC: 29 MMOL/L — SIGNIFICANT CHANGE UP (ref 22–31)
CREAT SERPL-MCNC: 0.7 MG/DL — SIGNIFICANT CHANGE UP (ref 0.5–1.3)
EGFR: 98 ML/MIN/1.73M2 — SIGNIFICANT CHANGE UP
GLUCOSE SERPL-MCNC: 98 MG/DL — SIGNIFICANT CHANGE UP (ref 70–99)
HCT VFR BLD CALC: 34.9 % — SIGNIFICANT CHANGE UP (ref 34.5–45)
HGB BLD-MCNC: 11 G/DL — LOW (ref 11.5–15.5)
LYMPHOCYTES # BLD AUTO: 2.6 K/UL — SIGNIFICANT CHANGE UP (ref 1–3.3)
LYMPHOCYTES # BLD AUTO: 52.6 % — HIGH (ref 13–44)
MCHC RBC-ENTMCNC: 27.1 PG — SIGNIFICANT CHANGE UP (ref 27–34)
MCHC RBC-ENTMCNC: 31.5 GM/DL — LOW (ref 32–36)
MCV RBC AUTO: 86 FL — SIGNIFICANT CHANGE UP (ref 80–100)
NEUTROPHILS # BLD AUTO: 1.8 K/UL — SIGNIFICANT CHANGE UP (ref 1.8–7.4)
NEUTROPHILS NFR BLD AUTO: 35.4 % — LOW (ref 43–77)
PLATELET # BLD AUTO: 208 K/UL — SIGNIFICANT CHANGE UP (ref 150–400)
POTASSIUM SERPL-MCNC: 3.8 MMOL/L — SIGNIFICANT CHANGE UP (ref 3.5–5.3)
POTASSIUM SERPL-SCNC: 3.8 MMOL/L — SIGNIFICANT CHANGE UP (ref 3.5–5.3)
PROT SERPL-MCNC: 6.6 G/DL — SIGNIFICANT CHANGE UP (ref 6–8.3)
RBC # BLD: 4.06 M/UL — SIGNIFICANT CHANGE UP (ref 3.8–5.2)
RBC # FLD: 13.9 % — SIGNIFICANT CHANGE UP (ref 10.3–14.5)
SARS-COV-2 RNA SPEC QL NAA+PROBE: NEGATIVE — SIGNIFICANT CHANGE UP
SODIUM SERPL-SCNC: 142 MMOL/L — SIGNIFICANT CHANGE UP (ref 135–145)
WBC # BLD: 5 K/UL — SIGNIFICANT CHANGE UP (ref 3.8–10.5)
WBC # FLD AUTO: 5 K/UL — SIGNIFICANT CHANGE UP (ref 3.8–10.5)

## 2022-04-20 PROCEDURE — 85025 COMPLETE CBC W/AUTO DIFF WBC: CPT

## 2022-04-20 PROCEDURE — U0003: CPT

## 2022-04-20 PROCEDURE — 36415 COLL VENOUS BLD VENIPUNCTURE: CPT

## 2022-04-20 PROCEDURE — 96413 CHEMO IV INFUSION 1 HR: CPT

## 2022-04-20 PROCEDURE — 80053 COMPREHEN METABOLIC PANEL: CPT

## 2022-04-20 PROCEDURE — 85027 COMPLETE CBC AUTOMATED: CPT

## 2022-04-20 RX ORDER — ADO-TRASTUZUMAB EMTANSINE 20 MG/ML
252 INJECTION, POWDER, LYOPHILIZED, FOR SOLUTION INTRAVENOUS ONCE
Refills: 0 | Status: COMPLETED | OUTPATIENT
Start: 2022-04-20 | End: 2022-04-20

## 2022-04-20 RX ADMIN — ADO-TRASTUZUMAB EMTANSINE 252 MILLIGRAM(S): 20 INJECTION, POWDER, LYOPHILIZED, FOR SOLUTION INTRAVENOUS at 13:50

## 2022-04-20 RX ADMIN — ADO-TRASTUZUMAB EMTANSINE 525.2 MILLIGRAM(S): 20 INJECTION, POWDER, LYOPHILIZED, FOR SOLUTION INTRAVENOUS at 13:20

## 2022-04-25 ENCOUNTER — NON-APPOINTMENT (OUTPATIENT)
Age: 62
End: 2022-04-25

## 2022-04-26 ENCOUNTER — NON-APPOINTMENT (OUTPATIENT)
Age: 62
End: 2022-04-26

## 2022-04-26 ENCOUNTER — APPOINTMENT (OUTPATIENT)
Dept: RADIATION ONCOLOGY | Facility: CLINIC | Age: 62
End: 2022-04-26

## 2022-05-02 NOTE — HISTORY OF PRESENT ILLNESS
Dr. Stauffer - [phone]   
[FreeTextEntry1] : Ms. Brigida Pedroza is a 61 year old female diagnosed with cT2N0 invasive breast cancer, status post marcela-adjuvant chemotherapy with TCHP 6/6, status post Left sided lumpectomy on 11/19/21 revealing LEFT breast 9mm poorly differentiated IDC, (ER-, NV-, HER2+), [id number], Stage 1A, with definitive response to presurgical chemo with negative margins and neg SLNBx (0/2). She had declined genetic testing. She plans to continue Herceptin/Perjeta q3 weeks for 17 cycles. \par \par She completed 5240 cGy /20 Fx of radiation to left breast in 3/2022. \par \par 5/6/21: Julián DXMG & US ([facility]): heterogeneously dense, L - 2.6cm ill-defined mass in reigon of palpable concern, inferior areas of asymmetry which efface, US - L - 2.9cm ill-defined hypoechoic mass 2:00 5FN, 0.2cm complicated cyst 11:00 6FN. BIRADS 5.\par 5/12/21: L US Guided bx x2 ([facility]): "R shaped clip" L 2:00 15FN - 1.2cm invasive ductal carcinoma w/ assoc chronic inflammatory infiltrate, poorly differentiated , ER (-), NV (-), HER2 Equivocal, FISH (+) L 1.1cm axillary LN 18FN - "S Shaped Clip" reactive LN, negative for carcinoma \par 6/18/2021 ([facility]) bilateral breast MRI showing 1. The index malignancy in the 2:00 axis of the left breast measures 3.4 cm x 2.3 cm x 2.4 cm. 2. A 3 mm focus of enhancement in the 5:00 axis of the left breast posterior depth is suspicious for malignancy. MR guided core needle biopsy is recommended.\par 3. Incidentally seen is a 2.4 cm T2 bright lesion in the posterior right hepatic dome. Abdominal ultrasound is recommended. MRI guided biopsy of left 5:00 axis recommended. BIRADS 4\par \par She was placed on marcela-adjuvant chemotherapy with TCHP 6/6 with Dr. Fontenot, completed on 10/13/21.\par \par 10/19/2021 ([facility]) bilateral breast MRI 1. Post neoadjuvant chemotherapy, significant decreased in size of index lesion, left breast 2:00 axis, residual foci of enhancement measuring in total 1.3 x 0.8 cm surrounding the biopsy clip. 2. Interval resolution of the left breast 5:00 axis suspicious focus of enhancement. 3. T2 hyperintense lesion in the right hepatic dome, likely hepatic cyst. No change. 4. No MRI evidence of right breast malignancy. \par \par 11/19/2021 ([facility]) left breast UOQ lumpectomy pathology: Breast, left, upper outer; lumpectomy:\par Final Diagnosis   (Stage [id number], Negative margin)\par 1. Breast, left, upper outer; lumpectomy: - Invasive ductal carcinoma, poorly differentiated, with definitive response to presurgical therapy, see synoptic report- Invasive carcinoma measures 9 mm in largest extent - Margins, as present in this specimen:\par Posterior: positive, for final posterior margin see part 6     Anterior: 1 mm Medial: 2 mm, for final medial margin see part 3   All remaining margins: over 5 mm  - Biopsy site changes   - Calcifications associated with wall of blood vessels\par 2. Breast, left, superior margin; excision:  - Benign breast tissue\par 3. Breast, left, medial margin; excision: - Benign breast tissue\par 4. Breast, left, inferior margin; excision: - Benign breast tissue\par 5. Breast, left, lateral margin; excision: - Benign breast tissue with changes of prior procedure and portion of tumor bed area\par 6. Breast, left, deep margin; excision: - Benign fibroadipose tissue and skeletal muscle\par 7. Axilla, left, non-sentinel nodes; biopsy: - One lymph node negative for metastatic carcinoma (0/1)\par 8. Axilla, left, sentinel node; biopsy: - One lymph node negative for metastatic carcinoma (0/1)  - Biopsy site changes\par Note: Repeat testing for ER, NV and HER-2 will be reported in an addendum.\par Verified by: Diamond Barfield M.D.\par (Electronic Signature)\par Reported on: 11/23/21 14:21 [facility], [facility], [address],\[place] [address]\par Phone: [phone] Fax: [phone]\par \par 12/28/2021 Consultation @ [facility] - Overall, the patient notes that she is healing well from surgery. She denies a history of radiation therapy. She lives in [place], and expressed some anxiety related to transportation for treatment and was offered a consultation and treatment closer to home.\par \par 20/20 fractions of radiation to left breast (prone) completed. Using skin cream over RT area for minor skin toxicity. No cough, SOB, chest pain\par \par She completed 5240 cGy /20 Fx of radiation to left breast in 3/2022. Today for f/u. c/o occasional left breast pain, dark skin over left breast. No cough, dysphagia, chest pain SOB. Advised to continue f/u with Med Onc for Her2 positive breast cancer..

## 2022-05-11 ENCOUNTER — APPOINTMENT (OUTPATIENT)
Dept: HEMATOLOGY ONCOLOGY | Facility: CLINIC | Age: 62
End: 2022-05-11
Payer: COMMERCIAL

## 2022-05-11 ENCOUNTER — APPOINTMENT (OUTPATIENT)
Dept: INFUSION THERAPY | Facility: CLINIC | Age: 62
End: 2022-05-11

## 2022-05-11 ENCOUNTER — LABORATORY RESULT (OUTPATIENT)
Age: 62
End: 2022-05-11

## 2022-05-11 ENCOUNTER — APPOINTMENT (OUTPATIENT)
Dept: BREAST CENTER | Facility: CLINIC | Age: 62
End: 2022-05-11
Payer: COMMERCIAL

## 2022-05-11 ENCOUNTER — OUTPATIENT (OUTPATIENT)
Dept: OUTPATIENT SERVICES | Facility: HOSPITAL | Age: 62
LOS: 1 days | End: 2022-05-11
Payer: COMMERCIAL

## 2022-05-11 VITALS
HEART RATE: 89 BPM | TEMPERATURE: 97 F | OXYGEN SATURATION: 99 % | RESPIRATION RATE: 18 BRPM | DIASTOLIC BLOOD PRESSURE: 75 MMHG | WEIGHT: 139.99 LBS | SYSTOLIC BLOOD PRESSURE: 143 MMHG | HEIGHT: 65 IN

## 2022-05-11 VITALS — BODY MASS INDEX: 26.31 KG/M2 | WEIGHT: 143 LBS | OXYGEN SATURATION: 98 % | HEART RATE: 91 BPM | HEIGHT: 62 IN

## 2022-05-11 VITALS
TEMPERATURE: 96.1 F | RESPIRATION RATE: 18 BRPM | SYSTOLIC BLOOD PRESSURE: 152 MMHG | BODY MASS INDEX: 26.87 KG/M2 | OXYGEN SATURATION: 98 % | DIASTOLIC BLOOD PRESSURE: 81 MMHG | WEIGHT: 146 LBS | HEART RATE: 89 BPM | HEIGHT: 62 IN

## 2022-05-11 DIAGNOSIS — C50.912 MALIGNANT NEOPLASM OF UNSPECIFIED SITE OF LEFT FEMALE BREAST: ICD-10-CM

## 2022-05-11 LAB
CRP SERPL-MCNC: <3 MG/L
ERYTHROCYTE [SEDIMENTATION RATE] IN BLOOD BY WESTERGREN METHOD: 40 MM/HR
LDH SERPL-CCNC: 209 U/L
MAGNESIUM SERPL-MCNC: 1.6 MG/DL
PHOSPHATE SERPL-MCNC: 3.1 MG/DL
SARS-COV-2 RNA SPEC QL NAA+PROBE: NEGATIVE — SIGNIFICANT CHANGE UP
URATE SERPL-MCNC: 5.5 MG/DL

## 2022-05-11 PROCEDURE — U0003: CPT

## 2022-05-11 PROCEDURE — 76642 ULTRASOUND BREAST LIMITED: CPT

## 2022-05-11 PROCEDURE — 93702 BIS XTRACELL FLUID ANALYSIS: CPT | Mod: NC

## 2022-05-11 PROCEDURE — 96413 CHEMO IV INFUSION 1 HR: CPT

## 2022-05-11 PROCEDURE — 99215 OFFICE O/P EST HI 40 MIN: CPT | Mod: 25

## 2022-05-11 PROCEDURE — 99213 OFFICE O/P EST LOW 20 MIN: CPT

## 2022-05-11 PROCEDURE — 36415 COLL VENOUS BLD VENIPUNCTURE: CPT

## 2022-05-11 RX ORDER — ADO-TRASTUZUMAB EMTANSINE 20 MG/ML
252 INJECTION, POWDER, LYOPHILIZED, FOR SOLUTION INTRAVENOUS ONCE
Refills: 0 | Status: COMPLETED | OUTPATIENT
Start: 2022-05-11 | End: 2022-05-11

## 2022-05-11 RX ADMIN — ADO-TRASTUZUMAB EMTANSINE 252 MILLIGRAM(S): 20 INJECTION, POWDER, LYOPHILIZED, FOR SOLUTION INTRAVENOUS at 14:10

## 2022-05-11 RX ADMIN — ADO-TRASTUZUMAB EMTANSINE 525.2 MILLIGRAM(S): 20 INJECTION, POWDER, LYOPHILIZED, FOR SOLUTION INTRAVENOUS at 13:38

## 2022-05-11 NOTE — PHYSICAL EXAM
[Fully active, able to carry on all pre-disease performance without restriction] : Status 0 - Fully active, able to carry on all pre-disease performance without restriction [Normal] : affect appropriate [de-identified] : s/p lumpectomy, healing well [de-identified] : trace pedal edema

## 2022-05-11 NOTE — ASSESSMENT
[FreeTextEntry1] : 62 yo AA female referred by Dr. Tello Shah for evaluation of newly diagnosed left breast cancer - at least 2.9 cm, \par IDC, ER neg, AK neg, HER2 patricia IHC equivocal, FISH amplified.  Left axilla LN negative.\par \par Patient accompanied by her . We reviewed the pathobiology of breast cancer, indication for NACT with HER2 targeted treatment.  Patient offered TCHP based on the TRYPHAENA protocol. \par \par Side effects and schema reviewed with patient. \par \par 6/30/2021\par  cycle 1 TCHP- tx plan and schema reviewed with pt and spouse\par ECHO 6/24/2021- LVEF 55%\par labs stable\par \par 7/14/2021\par Cycle 1, day 14\par Nausea - required meds day 3\par Insomnia\par Pelvic pain \par Diarrhea 3 x per day, loose BM -1-2 per day now\par Tingling sensation in fingers \par Pain meds prescribed - tramadol\par Stressed that if no appetite push liquids, and if no tolerance come IV fluids \par  return in 1 week for tx\par \par 7/21/2021\par TCHP # 2- day 1\par - patient feels better, mild pedal edema.\par - tachycardia- baseline - regular\par - BP - she takes amlodipine 2.5 mg - might contribute to swelling, fluid retention\par - extend dexa 5 mg PO BID x 5 days after treatment to alleviate pain and decrease risk of swelling\par - might benefit from HCTZ, will follow up with PCP\par - reviewed side effects\par \par 8/11/2021\par Patient for cycle 3 TCHP\par felt much better after cycle 2, felt Zyrtec decreased bone pain\par Denies nausea, diarrhea\par Mouth sore resolved, cannot afford Magic Mouthwash- send for viscus lidocaine PRN and will use salt water\par weight back to baseline\par Trace of pedal edema resolved\par \par 9/22/2021\par TCHP cycle 5 today\par Concerned about weakness in legs - leg swelling, start diuretics\par if progression of LE weakness will hold docetaxel with cycle 6\par HTN - start HCTZ\par Patient working from home, feels tired. \par She doesn’t want vaccine for COVID. \par Insomnia - will try melatonin\par ECHO -report pending \par \par 10/13/2021\par IDC T2N0 on neoadjuvant chemotherapy\par Excellent clinical response\par Patient for TCHP  6/6, \par She is happy she is finishing chemotherapy part.\par C/o weakness in the legs and neuropathy - referred to PT and hold Docetaxel last dose\par Explained plan for MRI, surgical follow up with Dr. Shah \par \par Plan to continue Trastuzumab and pertuzumab q 3 weeks until surgery.\par \par Following surgery if there is complete pathological response patient will continue trastuzumab and pertuzumab every 3 weeks for total of 17 cycles ( including neoadjuvant treatment) .\par If there is no pCR patient will be changed to TDM-1 for 14 cycles\par \par 12/22/2021\par 11/19/2021 ([facility]) left breast UOQ lumpectomy pathology: Breast, left, upper outer; lumpectomy:\par - Invasive ductal carcinoma, poorly differentiated, with definitive\par response to presurgical therapy, see synoptic report\par - Invasive carcinoma measures 9 mm in largest extent\par - Margins, as present in this specimen: Posterior: positive, for final posterior margin see part 6\par Anterior: 1 mm\par Medial: 2 mm, for final medial margin see part 3\par All remaining margins: over 5 mm\par - Biopsy site changes\par - Calcifications associated with wall of blood vessels\par SLNB pathology: 7. Axilla, left, non-sentinel nodes; biopsy:\par - One lymph node negative for metastatic carcinoma (0/1) 8. Axilla, left, sentinel node; biopsy: - One lymph node negative for metastatic carcinoma (0/1) - Biopsy site changes. \par -Pt here for Kadcyla #2- tolerating well \par -due for ECHO\par - Apt with Dr. Lombardo 12/28 for RT consult\par -ongoing weight loss- refer to nutritionist- defers any medication to stimulate appetite    \par - labs reviewed\par \par Kadcyla # 3/14 1/12/22\par Patient with neuropathy - LE, refer to neurologist for evaluation, referred to PT, OT.  Tingling sensation in LE, hands - feels less agile to draw. \par Option of treatment is fam-trastuzumab deruxtecan as patient high risk of recurrence and didn’t have CR after TCHP. There is reported neuropathy with Kadcyla in 21% and no evidence of neuropathy with Enhertu.\par patient went through COVID during Christmas, recovered well. ( declined vaccination) \par \par Patient completed RT, 5240 cGy 3/22\par She is willing to go back to TDM-1.  She will follow up next week for tx 3/30/2022 4/14\par ECHO - Jan 31/ 2022- no breathing issues, due for ECHO, referred, authorized, scheduled- patient cancelled appointment.  Stressed the importance of monitoring EF while on Kadcyla.\par Patient concerned about financial burden of the treatment. Referred for financial assistance, d/w , didn’t follow up with application.\par \par Reiterated with patient plan for total of 14 treatment with TDM-1 after surgery.  Tx # 7 / 14 today ( 5/11/2022). Patient frustrated with extend of the treatment states she was supposed to finish in June 2022.  Reviewed with patient that June 2022 plan was based on the initial plan if she would have CR to initial treatment and would only need to continue trastuzumba/ pertuzumab. Reviewed with patient indication for ongoing HER2 treatment based on Kendal study with change of the treatment to TDM-1 Kadcyla 3.6 mg/kg  x 14 q 3 weeks. \par Neuropathy - stable, denies imbalance, pain at night.  Referred to support group at [facility].\par Patient needs ECHO before next treatment.  \par check cbc and chem,

## 2022-05-11 NOTE — REVIEW OF SYSTEMS
[Fatigue] : fatigue [Recent Change In Weight] : ~T recent weight change [Anxiety] : anxiety [Negative] : Allergic/Immunologic [de-identified] : neuropathy - ongoing but stable

## 2022-05-12 LAB
CANCER AG27-29 SERPL-ACNC: 19.6 U/ML
CEA SERPL-MCNC: 1.6 NG/ML

## 2022-05-16 NOTE — ASSESSMENT
[FreeTextEntry1] : 60 yo female presents for follow-up of left 2:00 5cmFN IDC ER 0%, VT 0%, HER-2 equivocal, FISH positive s/p L breast upper outer lumpectomy on 11/19/21 path showing poorly differentiated IDC measuring .9 cm with definitive response to presurgical therapy, NSLNB (0/1) SLNB (0/1). Of note, positive family history but patient has declined genetic testing. Patient is feeling well, reports palpable abnormality around area of incision. Bedside US performed revealing fluid cavity in area of concern. Sozo measurement obtained in office today 3.3, WNL. S/p neoadjuvant chemotherapy, XRT. Currently receiving Kadcyla, anticipated completion in June 2022. Will therefore plan for DX MMG/US in July 2022 and re-examination with repeat sozo in August 2022. \par \par \par

## 2022-05-16 NOTE — HISTORY OF PRESENT ILLNESS
[FreeTextEntry1] : 60 yo female presents for follow-up of left 2:00 5cmFN IDC ER 0%, AR 0%, HER-2 equivocal, FISH positive s/p L breast upper outer lumpectomy on 11/19/21 path showing poorly differentiated IDC measuring .9 cm with definitive response to presurgical therapy, NSLNB (0/1) SLNB (0/1). Of note, positive family history but patient has declined genetic testing.\par \par Finished with TCHP on 10/13/2021 with Dr. Fontenot, had received Herceptin/Perjeta [id number] until surgery, now currently receiving Kadcyla with anticipated end date in June 2022, experiencing neuropathy. \par Completed 20/20 fractions of radiation to left breast (prone) on 3/15/2022 with Dr. Brennan. \par Patient is feeling well, reports palpable abnormality around area of incision. Denies pain, discharge.  \par \par

## 2022-05-16 NOTE — DATA REVIEWED
[FreeTextEntry1] : 5/6/21: Julián DXMG & US ([facility]): heterogeneously dense, L - 2.6cm ill-defined mass in reigon of palpable concern, inferior areas of asymmetry which efface, US - L - 2.9cm ill-defined hypoechoic mass 2:00 5FN, 0.2cm complicated cyst 11:00 6FN. BIRADS 5.\par 5/12/21: L US Guided bx x2 ([facility]): "R shaped clip" L 2:00 15FN - 1.2cm invasive ductal carcinoma w/ assoc chronic inflammatory infiltrate, poorly differentiated , ER (-), SC (-), HER2 Equivocal, FISH (+) L 1.1cm axillary LN 18FN - "S Shaped Clip" reactive LN, negative for carcinoma \par \par 6/18/2021 ([facility]) bilateral breast MRI showing 1. The index malignancy in the 2:00 axis of the left breast measures 3.4 cm x 2.3 cm x 2.4 cm. 2. A 3 mm focus of enhancement in the 5:00 axis of the left breast posterior depth is suspicious for malignancy. MR guided core needle biopsy is recommended.\par 3. Incidentally seen is a 2.4 cm T2 bright lesion in the posterior right hepatic dome. Abdominal ultrasound is recommended. MRI guided biopsy of left 5:00 axis recommended. BIRADS 4\par \par 10/19/2021 ([facility]) bilateral breast MRI 1. Post neoadjuvant chemotherapy, significant decreased in size of index lesion, left breast 2:00 axis, residual foci of enhancement measuring in total 1.3 x 0.8 cm surrounding the biopsy clip. 2. Interval resolution of the left breast 5:00 axis suspicious focus of enhancement. 3. T2 hyperintense lesion in the right hepatic dome, likely hepatic cyst. No change.\par 4. No MRI evidence of right breast malignancy. \par \par 11/19/2021 ([facility]) left breast UOQ lumpectomy pathology: Breast, left, upper outer; lumpectomy:\par - Invasive ductal carcinoma, poorly differentiated, with definitive\par response to presurgical therapy, see synoptic report\par - Invasive carcinoma measures 9 mm in largest extent\par - Margins, as present in this specimen: Posterior: positive, for final posterior margin see part 6\par Anterior: 1 mm\par Medial: 2 mm, for final medial margin see part 3\par All remaining margins: over 5 mm\par - Biopsy site changes\par - Calcifications associated with wall of blood vessels\par SLNB pathology: 7. Axilla, left, non-sentinel nodes; biopsy:\par - One lymph node negative for metastatic carcinoma (0/1) 8. Axilla, left, sentinel node; biopsy: - One lymph node negative for metastatic carcinoma (0/1) - Biopsy site changes

## 2022-05-16 NOTE — PROCEDURE
[FreeTextEntry3] : Technique: a targeted left breast ultrasound was performed with evaluation of the upper outer quadrant, retroareolar region, and axilla.\par US Findings: left breast fluid cavity at 10:00 was detected\par 1.8cm x 3.1cm transverse by 1.9cm x 4.3cm longitudinal in dimension\par No suspicious solid or complex cystic masses were detected\par

## 2022-05-24 ENCOUNTER — OUTPATIENT (OUTPATIENT)
Dept: OUTPATIENT SERVICES | Facility: HOSPITAL | Age: 62
LOS: 1 days | End: 2022-05-24
Payer: COMMERCIAL

## 2022-05-24 DIAGNOSIS — C50.912 MALIGNANT NEOPLASM OF UNSPECIFIED SITE OF LEFT FEMALE BREAST: ICD-10-CM

## 2022-05-24 PROCEDURE — 93306 TTE W/DOPPLER COMPLETE: CPT | Mod: 26

## 2022-05-24 PROCEDURE — 93356 MYOCRD STRAIN IMG SPCKL TRCK: CPT | Mod: 26

## 2022-05-24 PROCEDURE — 93306 TTE W/DOPPLER COMPLETE: CPT

## 2022-06-01 ENCOUNTER — OUTPATIENT (OUTPATIENT)
Dept: OUTPATIENT SERVICES | Facility: HOSPITAL | Age: 62
LOS: 1 days | End: 2022-06-01
Payer: COMMERCIAL

## 2022-06-01 ENCOUNTER — APPOINTMENT (OUTPATIENT)
Dept: INFUSION THERAPY | Facility: CLINIC | Age: 62
End: 2022-06-01

## 2022-06-01 VITALS
SYSTOLIC BLOOD PRESSURE: 142 MMHG | HEART RATE: 84 BPM | DIASTOLIC BLOOD PRESSURE: 74 MMHG | TEMPERATURE: 98 F | OXYGEN SATURATION: 97 % | RESPIRATION RATE: 17 BRPM

## 2022-06-01 VITALS
HEART RATE: 78 BPM | RESPIRATION RATE: 16 BRPM | TEMPERATURE: 98 F | SYSTOLIC BLOOD PRESSURE: 144 MMHG | WEIGHT: 139.99 LBS | OXYGEN SATURATION: 98 % | HEIGHT: 65 IN | DIASTOLIC BLOOD PRESSURE: 76 MMHG

## 2022-06-01 DIAGNOSIS — C50.912 MALIGNANT NEOPLASM OF UNSPECIFIED SITE OF LEFT FEMALE BREAST: ICD-10-CM

## 2022-06-01 LAB
ALBUMIN SERPL ELPH-MCNC: 3.1 G/DL — LOW (ref 3.3–5)
ALP SERPL-CCNC: 74 U/L — SIGNIFICANT CHANGE UP (ref 40–120)
ALT FLD-CCNC: 26 U/L — SIGNIFICANT CHANGE UP (ref 10–45)
ANION GAP SERPL CALC-SCNC: 12 MMOL/L — SIGNIFICANT CHANGE UP (ref 5–17)
AST SERPL-CCNC: 27 U/L — SIGNIFICANT CHANGE UP (ref 10–40)
BILIRUB SERPL-MCNC: 0.8 MG/DL — SIGNIFICANT CHANGE UP (ref 0.2–1.2)
BUN SERPL-MCNC: 14 MG/DL — SIGNIFICANT CHANGE UP (ref 7–23)
CALCIUM SERPL-MCNC: 10 MG/DL — SIGNIFICANT CHANGE UP (ref 8.4–10.5)
CHLORIDE SERPL-SCNC: 104 MMOL/L — SIGNIFICANT CHANGE UP (ref 96–108)
CO2 SERPL-SCNC: 30 MMOL/L — SIGNIFICANT CHANGE UP (ref 22–31)
CREAT SERPL-MCNC: 0.7 MG/DL — SIGNIFICANT CHANGE UP (ref 0.5–1.3)
EGFR: 98 ML/MIN/1.73M2 — SIGNIFICANT CHANGE UP
GLUCOSE SERPL-MCNC: 89 MG/DL — SIGNIFICANT CHANGE UP (ref 70–99)
HCT VFR BLD CALC: 37.6 % — SIGNIFICANT CHANGE UP (ref 34.5–45)
HGB BLD-MCNC: 11.7 G/DL — SIGNIFICANT CHANGE UP (ref 11.5–15.5)
LYMPHOCYTES # BLD AUTO: 2.8 K/UL — SIGNIFICANT CHANGE UP (ref 1–3.3)
LYMPHOCYTES # BLD AUTO: 48.1 % — HIGH (ref 13–44)
MCHC RBC-ENTMCNC: 26.8 PG — LOW (ref 27–34)
MCHC RBC-ENTMCNC: 31.1 GM/DL — LOW (ref 32–36)
MCV RBC AUTO: 86.2 FL — SIGNIFICANT CHANGE UP (ref 80–100)
NEUTROPHILS # BLD AUTO: 2.6 K/UL — SIGNIFICANT CHANGE UP (ref 1.8–7.4)
NEUTROPHILS NFR BLD AUTO: 43.9 % — SIGNIFICANT CHANGE UP (ref 43–77)
PLATELET # BLD AUTO: 211 K/UL — SIGNIFICANT CHANGE UP (ref 150–400)
POTASSIUM SERPL-MCNC: 4.2 MMOL/L — SIGNIFICANT CHANGE UP (ref 3.5–5.3)
POTASSIUM SERPL-SCNC: 4.2 MMOL/L — SIGNIFICANT CHANGE UP (ref 3.5–5.3)
PROT SERPL-MCNC: 7.5 G/DL — SIGNIFICANT CHANGE UP (ref 6–8.3)
RBC # BLD: 4.36 M/UL — SIGNIFICANT CHANGE UP (ref 3.8–5.2)
RBC # FLD: 13.2 % — SIGNIFICANT CHANGE UP (ref 10.3–14.5)
SARS-COV-2 RNA SPEC QL NAA+PROBE: NEGATIVE — SIGNIFICANT CHANGE UP
SODIUM SERPL-SCNC: 146 MMOL/L — HIGH (ref 135–145)
WBC # BLD: 5.9 K/UL — SIGNIFICANT CHANGE UP (ref 3.8–10.5)
WBC # FLD AUTO: 5.9 K/UL — SIGNIFICANT CHANGE UP (ref 3.8–10.5)

## 2022-06-01 PROCEDURE — U0003: CPT

## 2022-06-01 PROCEDURE — 36415 COLL VENOUS BLD VENIPUNCTURE: CPT

## 2022-06-01 PROCEDURE — 85025 COMPLETE CBC W/AUTO DIFF WBC: CPT

## 2022-06-01 PROCEDURE — 96413 CHEMO IV INFUSION 1 HR: CPT

## 2022-06-01 PROCEDURE — 80053 COMPREHEN METABOLIC PANEL: CPT

## 2022-06-08 ENCOUNTER — APPOINTMENT (OUTPATIENT)
Dept: HEMATOLOGY ONCOLOGY | Facility: CLINIC | Age: 62
End: 2022-06-08

## 2022-06-22 ENCOUNTER — APPOINTMENT (OUTPATIENT)
Dept: HEMATOLOGY ONCOLOGY | Facility: CLINIC | Age: 62
End: 2022-06-22
Payer: COMMERCIAL

## 2022-06-22 ENCOUNTER — LABORATORY RESULT (OUTPATIENT)
Age: 62
End: 2022-06-22

## 2022-06-22 ENCOUNTER — APPOINTMENT (OUTPATIENT)
Dept: INFUSION THERAPY | Facility: CLINIC | Age: 62
End: 2022-06-22

## 2022-06-22 ENCOUNTER — OUTPATIENT (OUTPATIENT)
Dept: OUTPATIENT SERVICES | Facility: HOSPITAL | Age: 62
LOS: 1 days | End: 2022-06-22
Payer: COMMERCIAL

## 2022-06-22 VITALS
WEIGHT: 145 LBS | TEMPERATURE: 98.6 F | RESPIRATION RATE: 18 BRPM | DIASTOLIC BLOOD PRESSURE: 84 MMHG | OXYGEN SATURATION: 99 % | HEIGHT: 62 IN | SYSTOLIC BLOOD PRESSURE: 157 MMHG | HEART RATE: 99 BPM | BODY MASS INDEX: 26.68 KG/M2

## 2022-06-22 VITALS
HEIGHT: 65 IN | WEIGHT: 145.06 LBS | OXYGEN SATURATION: 99 % | RESPIRATION RATE: 18 BRPM | TEMPERATURE: 99 F | HEART RATE: 99 BPM | SYSTOLIC BLOOD PRESSURE: 157 MMHG | DIASTOLIC BLOOD PRESSURE: 84 MMHG

## 2022-06-22 DIAGNOSIS — C50.912 MALIGNANT NEOPLASM OF UNSPECIFIED SITE OF LEFT FEMALE BREAST: ICD-10-CM

## 2022-06-22 LAB — SARS-COV-2 RNA SPEC QL NAA+PROBE: NEGATIVE — SIGNIFICANT CHANGE UP

## 2022-06-22 PROCEDURE — 96413 CHEMO IV INFUSION 1 HR: CPT

## 2022-06-22 PROCEDURE — U0003: CPT

## 2022-06-22 PROCEDURE — 99214 OFFICE O/P EST MOD 30 MIN: CPT | Mod: 25

## 2022-06-22 PROCEDURE — 36415 COLL VENOUS BLD VENIPUNCTURE: CPT

## 2022-06-22 RX ORDER — DIPHENHYDRAMINE HYDROCHLORIDE AND LIDOCAINE HYDROCHLORIDE AND ALUMINUM HYDROXIDE AND MAGNESIUM HYDRO
KIT
Qty: 1 | Refills: 3 | Status: DISCONTINUED | COMMUNITY
Start: 2021-07-28 | End: 2022-06-22

## 2022-06-22 RX ORDER — DEXAMETHASONE 4 MG/1
4 TABLET ORAL
Qty: 40 | Refills: 4 | Status: DISCONTINUED | COMMUNITY
Start: 2021-06-25 | End: 2022-06-22

## 2022-06-22 RX ADMIN — ADO-TRASTUZUMAB EMTANSINE 525.2 MILLIGRAM(S): 20 INJECTION, POWDER, LYOPHILIZED, FOR SOLUTION INTRAVENOUS at 14:08

## 2022-06-22 RX ADMIN — ADO-TRASTUZUMAB EMTANSINE 252 MILLIGRAM(S): 20 INJECTION, POWDER, LYOPHILIZED, FOR SOLUTION INTRAVENOUS at 14:38

## 2022-06-22 NOTE — PHYSICAL EXAM
[Fully active, able to carry on all pre-disease performance without restriction] : Status 0 - Fully active, able to carry on all pre-disease performance without restriction [Normal] : affect appropriate [de-identified] : s/p lumpectomy, healing well [de-identified] : trace pedal edema

## 2022-06-22 NOTE — ASSESSMENT
[FreeTextEntry1] : 60 yo AA female referred by Dr. Tello Shah for evaluation of newly diagnosed left breast cancer - at least 2.9 cm, \par IDC, ER neg, MT neg, HER2 patricia IHC equivocal, FISH amplified.  Left axilla LN negative.\par \par Patient accompanied by her . We reviewed the pathobiology of breast cancer, indication for NACT with HER2 targeted treatment.  Patient offered TCHP based on the TRYPHAENA protocol. \par \par Side effects and schema reviewed with patient. \par \par 6/30/2021\par  cycle 1 TCHP- tx plan and schema reviewed with pt and spouse\par ECHO 6/24/2021- LVEF 55%\par labs stable\par \par 7/14/2021\par Cycle 1, day 14\par Nausea - required meds day 3\par Insomnia\par Pelvic pain \par Diarrhea 3 x per day, loose BM -1-2 per day now\par Tingling sensation in fingers \par Pain meds prescribed - tramadol\par Stressed that if no appetite push liquids, and if no tolerance come IV fluids \par  return in 1 week for tx\par \par 7/21/2021\par TCHP # 2- day 1\par - patient feels better, mild pedal edema.\par - tachycardia- baseline - regular\par - BP - she takes amlodipine 2.5 mg - might contribute to swelling, fluid retention\par - extend dexa 5 mg PO BID x 5 days after treatment to alleviate pain and decrease risk of swelling\par - might benefit from HCTZ, will follow up with PCP\par - reviewed side effects\par \par 8/11/2021\par Patient for cycle 3 TCHP\par felt much better after cycle 2, felt Zyrtec decreased bone pain\par Denies nausea, diarrhea\par Mouth sore resolved, cannot afford Magic Mouthwash- send for viscus lidocaine PRN and will use salt water\par weight back to baseline\par Trace of pedal edema resolved\par \par 9/22/2021\par TCHP cycle 5 today\par Concerned about weakness in legs - leg swelling, start diuretics\par if progression of LE weakness will hold docetaxel with cycle 6\par HTN - start HCTZ\par Patient working from home, feels tired. \par She doesn’t want vaccine for COVID. \par Insomnia - will try melatonin\par ECHO -report pending \par \par 10/13/2021\par IDC T2N0 on neoadjuvant chemotherapy\par Excellent clinical response\par Patient for TCHP  6/6, \par She is happy she is finishing chemotherapy part.\par C/o weakness in the legs and neuropathy - referred to PT and hold Docetaxel last dose\par Explained plan for MRI, surgical follow up with Dr. Shah \par \par Plan to continue Trastuzumab and pertuzumab q 3 weeks until surgery.\par \par Following surgery if there is complete pathological response patient will continue trastuzumab and pertuzumab every 3 weeks for total of 17 cycles ( including neoadjuvant treatment) .\par If there is no pCR patient will be changed to TDM-1 for 14 cycles\par \par 12/22/2021\par 11/19/2021 ([facility]) left breast UOQ lumpectomy pathology: Breast, left, upper outer; lumpectomy:\par - Invasive ductal carcinoma, poorly differentiated, with definitive\par response to presurgical therapy, see synoptic report\par - Invasive carcinoma measures 9 mm in largest extent\par - Margins, as present in this specimen: Posterior: positive, for final posterior margin see part 6\par Anterior: 1 mm\par Medial: 2 mm, for final medial margin see part 3\par All remaining margins: over 5 mm\par - Biopsy site changes\par - Calcifications associated with wall of blood vessels\par SLNB pathology: 7. Axilla, left, non-sentinel nodes; biopsy:\par - One lymph node negative for metastatic carcinoma (0/1) 8. Axilla, left, sentinel node; biopsy: - One lymph node negative for metastatic carcinoma (0/1) - Biopsy site changes. \par -Pt here for Kadcyla #2- tolerating well \par -due for ECHO\par - Apt with Dr. Lombardo 12/28 for RT consult\par -ongoing weight loss- refer to nutritionist- defers any medication to stimulate appetite    \par - labs reviewed\par \par Kadcyla # 3/14 1/12/22\par Patient with neuropathy - LE, refer to neurologist for evaluation, referred to PT, OT.  Tingling sensation in LE, hands - feels less agile to draw. \par Option of treatment is fam-trastuzumab deruxtecan as patient high risk of recurrence and didn’t have CR after TCHP. There is reported neuropathy with Kadcyla in 21% and no evidence of neuropathy with Enhertu.\par patient went through COVID during Christmas, recovered well. ( declined vaccination) \par \par Patient completed RT, 5240 cGy 3/22\par She is willing to go back to TDM-1.  She will follow up next week for tx 3/30/2022 4/14\par ECHO - Jan 31/ 2022- no breathing issues, due for ECHO, referred, authorized, scheduled- patient cancelled appointment.  Stressed the importance of monitoring EF while on Kadcyla.\par Patient concerned about financial burden of the treatment. Referred for financial assistance, d/w , didn’t follow up with application.\par \par Reiterated with patient plan for total of 14 treatment with TDM-1 after surgery.  Tx # 9 / 14 today ( 6/22/2022). Patient frustrated with extend of the treatment states she was supposed to finish in June 2022.  Reviewed with patient that June 2022 plan was based on the initial plan if she would have CR to initial treatment and would only need to continue trastuzumba/ pertuzumab. Reviewed with patient indication for ongoing HER2 treatment based on Kendal study with change of the treatment to TDM-1 Kadcyla 3.6 mg/kg  x 14 q 3 weeks. \par Neuropathy - stable, denies imbalance, pain at night.  Referred to support group at [facility].\par Echo done and reviewed .  \par check cbc and chem, - case and mgmt discussed with Dr. Fontenot

## 2022-06-22 NOTE — REVIEW OF SYSTEMS
[Fatigue] : fatigue [Recent Change In Weight] : ~T recent weight change [Anxiety] : anxiety [Negative] : Allergic/Immunologic [de-identified] : neuropathy - ongoing but stable

## 2022-06-22 NOTE — HISTORY OF PRESENT ILLNESS
[Disease: _____________________] : Disease: [unfilled] [T: ___] : T[unfilled] [N: ___] : N[unfilled] [Home] : at home, [unfilled] , at the time of the visit. [Medical Office: ([facility])___] : at the medical office located in  [Verbal consent obtained from patient] : the patient, [unfilled] [de-identified] : 61 year old female presents today for initial consultation of breast cancer, referred by Dr. Shah.  She was sent for diagnostic mammogram for which she felt a left upper quadrant palpalbe mass x 4 months.  \par \par Most recent mammo 5/6/21: showed left 2:00 highly suspicious mass and prominent left axillary lymph node for which an US guided core biopsy was recommended \par \par 5/17/21: left breast US guide core biopsy showed invasive poorly differentiated ductal carcinoma associated with chronic inflammatory infiltrate at the left 12:00 position. The left axillary lymph node biopsy demonstrated a reactive lymph node, negative for carcinoma. These findings are concordant with imaging. ER- OH-, Her 2 amplified FISH. Left axillary LN- reactive on pathology.\par \par \par Risk Factors:\par Age at menarche- 11\par Age at menopause-49\par G6,P6\par Age at first live birth-17\par OCP-denies\par HRT-denies\par Family History-denies\par Genetics-denies\par Smoker-former smoker\par \par 9/22/2021\par Patient cycle 5 TCHP- c/o fatigue, trace of pedal edema. ECHO pending [de-identified] : ER -neg, RI neg, HER 2 amplified  [de-identified] : Patient presents today for follow up of breast cancer- here for treatment

## 2022-06-23 LAB
CANCER AG27-29 SERPL-ACNC: 24.5 U/ML
CEA SERPL-MCNC: 1.6 NG/ML
CRP SERPL-MCNC: <3 MG/L
ERYTHROCYTE [SEDIMENTATION RATE] IN BLOOD BY WESTERGREN METHOD: 57 MM/HR
LDH SERPL-CCNC: 259 U/L
MAGNESIUM SERPL-MCNC: 1.9 MG/DL
PHOSPHATE SERPL-MCNC: 2.1 MG/DL
URATE SERPL-MCNC: 5.3 MG/DL

## 2022-07-13 ENCOUNTER — APPOINTMENT (OUTPATIENT)
Dept: INFUSION THERAPY | Facility: CLINIC | Age: 62
End: 2022-07-13

## 2022-07-13 ENCOUNTER — OUTPATIENT (OUTPATIENT)
Dept: OUTPATIENT SERVICES | Facility: HOSPITAL | Age: 62
LOS: 1 days | End: 2022-07-13
Payer: COMMERCIAL

## 2022-07-13 VITALS
DIASTOLIC BLOOD PRESSURE: 78 MMHG | TEMPERATURE: 98 F | WEIGHT: 145.06 LBS | HEART RATE: 74 BPM | RESPIRATION RATE: 18 BRPM | SYSTOLIC BLOOD PRESSURE: 134 MMHG | OXYGEN SATURATION: 98 % | HEIGHT: 65 IN

## 2022-07-13 VITALS
TEMPERATURE: 98 F | HEART RATE: 70 BPM | RESPIRATION RATE: 18 BRPM | OXYGEN SATURATION: 97 % | SYSTOLIC BLOOD PRESSURE: 132 MMHG | DIASTOLIC BLOOD PRESSURE: 70 MMHG

## 2022-07-13 DIAGNOSIS — D45 POLYCYTHEMIA VERA: ICD-10-CM

## 2022-07-13 LAB
ALBUMIN SERPL ELPH-MCNC: 3.9 G/DL — SIGNIFICANT CHANGE UP (ref 3.3–5)
ALP SERPL-CCNC: 74 U/L — SIGNIFICANT CHANGE UP (ref 40–120)
ALT FLD-CCNC: 26 U/L — SIGNIFICANT CHANGE UP (ref 10–45)
ANION GAP SERPL CALC-SCNC: 12 MMOL/L — SIGNIFICANT CHANGE UP (ref 5–17)
AST SERPL-CCNC: 26 U/L — SIGNIFICANT CHANGE UP (ref 10–40)
BASOPHILS # BLD AUTO: 0.02 K/UL — SIGNIFICANT CHANGE UP (ref 0–0.2)
BASOPHILS NFR BLD AUTO: 0.4 % — SIGNIFICANT CHANGE UP (ref 0–2)
BILIRUB SERPL-MCNC: 0.5 MG/DL — SIGNIFICANT CHANGE UP (ref 0.2–1.2)
BUN SERPL-MCNC: 18 MG/DL — SIGNIFICANT CHANGE UP (ref 7–23)
CALCIUM SERPL-MCNC: 9.7 MG/DL — SIGNIFICANT CHANGE UP (ref 8.4–10.5)
CHLORIDE SERPL-SCNC: 105 MMOL/L — SIGNIFICANT CHANGE UP (ref 96–108)
CO2 SERPL-SCNC: 24 MMOL/L — SIGNIFICANT CHANGE UP (ref 22–31)
CREAT SERPL-MCNC: 0.8 MG/DL — SIGNIFICANT CHANGE UP (ref 0.5–1.3)
EGFR: 83 ML/MIN/1.73M2 — SIGNIFICANT CHANGE UP
EOSINOPHIL # BLD AUTO: 0.08 K/UL — SIGNIFICANT CHANGE UP (ref 0–0.5)
EOSINOPHIL NFR BLD AUTO: 1.4 % — SIGNIFICANT CHANGE UP (ref 0–6)
GLUCOSE SERPL-MCNC: 124 MG/DL — HIGH (ref 70–99)
HCT VFR BLD CALC: 37.1 % — SIGNIFICANT CHANGE UP (ref 34.5–45)
HGB BLD-MCNC: 11.5 G/DL — SIGNIFICANT CHANGE UP (ref 11.5–15.5)
IMM GRANULOCYTES NFR BLD AUTO: 0.4 % — SIGNIFICANT CHANGE UP (ref 0–1.5)
LYMPHOCYTES # BLD AUTO: 2.64 K/UL — SIGNIFICANT CHANGE UP (ref 1–3.3)
LYMPHOCYTES # BLD AUTO: 47.7 % — HIGH (ref 13–44)
MCHC RBC-ENTMCNC: 26.6 PG — LOW (ref 27–34)
MCHC RBC-ENTMCNC: 31 GM/DL — LOW (ref 32–36)
MCV RBC AUTO: 85.7 FL — SIGNIFICANT CHANGE UP (ref 80–100)
MONOCYTES # BLD AUTO: 0.38 K/UL — SIGNIFICANT CHANGE UP (ref 0–0.9)
MONOCYTES NFR BLD AUTO: 6.9 % — SIGNIFICANT CHANGE UP (ref 2–14)
NEUTROPHILS # BLD AUTO: 2.39 K/UL — SIGNIFICANT CHANGE UP (ref 1.8–7.4)
NEUTROPHILS NFR BLD AUTO: 43.2 % — SIGNIFICANT CHANGE UP (ref 43–77)
NRBC # BLD: 0 /100 WBCS — SIGNIFICANT CHANGE UP (ref 0–0)
PLATELET # BLD AUTO: 192 K/UL — SIGNIFICANT CHANGE UP (ref 150–400)
POTASSIUM SERPL-MCNC: 3.8 MMOL/L — SIGNIFICANT CHANGE UP (ref 3.5–5.3)
POTASSIUM SERPL-SCNC: 3.8 MMOL/L — SIGNIFICANT CHANGE UP (ref 3.5–5.3)
PROT SERPL-MCNC: 7.5 G/DL — SIGNIFICANT CHANGE UP (ref 6–8.3)
RBC # BLD: 4.33 M/UL — SIGNIFICANT CHANGE UP (ref 3.8–5.2)
RBC # FLD: 12.9 % — SIGNIFICANT CHANGE UP (ref 10.3–14.5)
SARS-COV-2 RNA SPEC QL NAA+PROBE: NEGATIVE — SIGNIFICANT CHANGE UP
SODIUM SERPL-SCNC: 141 MMOL/L — SIGNIFICANT CHANGE UP (ref 135–145)
WBC # BLD: 5.53 K/UL — SIGNIFICANT CHANGE UP (ref 3.8–10.5)
WBC # FLD AUTO: 5.53 K/UL — SIGNIFICANT CHANGE UP (ref 3.8–10.5)

## 2022-07-13 PROCEDURE — 80053 COMPREHEN METABOLIC PANEL: CPT

## 2022-07-13 PROCEDURE — 96413 CHEMO IV INFUSION 1 HR: CPT

## 2022-07-13 PROCEDURE — U0003: CPT

## 2022-07-13 PROCEDURE — 85025 COMPLETE CBC W/AUTO DIFF WBC: CPT

## 2022-07-13 PROCEDURE — 36415 COLL VENOUS BLD VENIPUNCTURE: CPT

## 2022-07-13 RX ORDER — ADO-TRASTUZUMAB EMTANSINE 20 MG/ML
252 INJECTION, POWDER, LYOPHILIZED, FOR SOLUTION INTRAVENOUS ONCE
Refills: 0 | Status: COMPLETED | OUTPATIENT
Start: 2022-07-13 | End: 2022-07-13

## 2022-07-13 RX ORDER — ACETAMINOPHEN 500 MG
650 TABLET ORAL ONCE
Refills: 0 | Status: COMPLETED | OUTPATIENT
Start: 2022-07-13 | End: 2022-07-13

## 2022-07-13 RX ADMIN — ADO-TRASTUZUMAB EMTANSINE 252 MILLIGRAM(S): 20 INJECTION, POWDER, LYOPHILIZED, FOR SOLUTION INTRAVENOUS at 13:32

## 2022-07-13 RX ADMIN — ADO-TRASTUZUMAB EMTANSINE 252 MILLIGRAM(S): 20 INJECTION, POWDER, LYOPHILIZED, FOR SOLUTION INTRAVENOUS at 14:05

## 2022-07-13 RX ADMIN — Medication 650 MILLIGRAM(S): at 12:45

## 2022-08-02 ENCOUNTER — NON-APPOINTMENT (OUTPATIENT)
Age: 62
End: 2022-08-02

## 2022-08-03 ENCOUNTER — LABORATORY RESULT (OUTPATIENT)
Age: 62
End: 2022-08-03

## 2022-08-03 ENCOUNTER — OUTPATIENT (OUTPATIENT)
Dept: OUTPATIENT SERVICES | Facility: HOSPITAL | Age: 62
LOS: 1 days | End: 2022-08-03
Payer: COMMERCIAL

## 2022-08-03 ENCOUNTER — APPOINTMENT (OUTPATIENT)
Dept: HEMATOLOGY ONCOLOGY | Facility: CLINIC | Age: 62
End: 2022-08-03

## 2022-08-03 ENCOUNTER — APPOINTMENT (OUTPATIENT)
Dept: INFUSION THERAPY | Facility: CLINIC | Age: 62
End: 2022-08-03

## 2022-08-03 VITALS
SYSTOLIC BLOOD PRESSURE: 169 MMHG | HEIGHT: 62 IN | TEMPERATURE: 96.8 F | WEIGHT: 149 LBS | OXYGEN SATURATION: 98 % | DIASTOLIC BLOOD PRESSURE: 84 MMHG | HEART RATE: 92 BPM | RESPIRATION RATE: 18 BRPM | BODY MASS INDEX: 27.42 KG/M2

## 2022-08-03 VITALS
WEIGHT: 149.03 LBS | RESPIRATION RATE: 18 BRPM | DIASTOLIC BLOOD PRESSURE: 84 MMHG | HEART RATE: 92 BPM | OXYGEN SATURATION: 98 % | SYSTOLIC BLOOD PRESSURE: 169 MMHG | TEMPERATURE: 97 F | HEIGHT: 65 IN

## 2022-08-03 DIAGNOSIS — C50.912 MALIGNANT NEOPLASM OF UNSPECIFIED SITE OF LEFT FEMALE BREAST: ICD-10-CM

## 2022-08-03 LAB
ALBUMIN SERPL ELPH-MCNC: 3.2 G/DL
ALP BLD-CCNC: 73 U/L
ALT SERPL-CCNC: 27 U/L
ANION GAP SERPL CALC-SCNC: 10 MMOL/L
AST SERPL-CCNC: 30 U/L
BILIRUB SERPL-MCNC: 0.9 MG/DL
BUN SERPL-MCNC: 13 MG/DL
CALCIUM SERPL-MCNC: 9.7 MG/DL
CHLORIDE SERPL-SCNC: 103 MMOL/L
CO2 SERPL-SCNC: 27 MMOL/L
CREAT SERPL-MCNC: 0.5 MG/DL
CRP SERPL-MCNC: <3 MG/L
EGFR: 106 ML/MIN/1.73M2
ERYTHROCYTE [SEDIMENTATION RATE] IN BLOOD BY WESTERGREN METHOD: 49 MM/HR
GLUCOSE SERPL-MCNC: 124 MG/DL
LDH SERPL-CCNC: 210 U/L
MAGNESIUM SERPL-MCNC: 1.7 MG/DL
PHOSPHATE SERPL-MCNC: 2.9 MG/DL
POTASSIUM SERPL-SCNC: 3.8 MMOL/L
PROT SERPL-MCNC: 7.3 G/DL
SARS-COV-2 RNA SPEC QL NAA+PROBE: NEGATIVE — SIGNIFICANT CHANGE UP
SODIUM SERPL-SCNC: 140 MMOL/L
URATE SERPL-MCNC: 5.3 MG/DL

## 2022-08-03 PROCEDURE — U0003: CPT

## 2022-08-03 PROCEDURE — 99214 OFFICE O/P EST MOD 30 MIN: CPT | Mod: 25

## 2022-08-03 PROCEDURE — 36415 COLL VENOUS BLD VENIPUNCTURE: CPT

## 2022-08-03 PROCEDURE — 96413 CHEMO IV INFUSION 1 HR: CPT

## 2022-08-03 RX ADMIN — ADO-TRASTUZUMAB EMTANSINE 252 MILLIGRAM(S): 20 INJECTION, POWDER, LYOPHILIZED, FOR SOLUTION INTRAVENOUS at 14:35

## 2022-08-03 RX ADMIN — ADO-TRASTUZUMAB EMTANSINE 252 MILLIGRAM(S): 20 INJECTION, POWDER, LYOPHILIZED, FOR SOLUTION INTRAVENOUS at 14:03

## 2022-08-03 NOTE — ASSESSMENT
[FreeTextEntry1] : 62 yo AA female referred by Dr. Tello Shah for evaluation of newly diagnosed left breast cancer - at least 2.9 cm, \par IDC, ER neg, CA neg, HER2 patricia IHC equivocal, FISH amplified.  Left axilla LN negative.\par \par Patient accompanied by her . We reviewed the pathobiology of breast cancer, indication for NACT with HER2 targeted treatment.  Patient offered TCHP based on the TRYPHAENA protocol. \par \par Side effects and schema reviewed with patient. \par \par 6/30/2021\par  cycle 1 TCHP- tx plan and schema reviewed with pt and spouse\par ECHO 6/24/2021- LVEF 55%\par labs stable\par \par 7/14/2021\par Cycle 1, day 14\par Nausea - required meds day 3\par Insomnia\par Pelvic pain \par Diarrhea 3 x per day, loose BM -1-2 per day now\par Tingling sensation in fingers \par Pain meds prescribed - tramadol\par Stressed that if no appetite push liquids, and if no tolerance come IV fluids \par  return in 1 week for tx\par \par 7/21/2021\par TCHP # 2- day 1\par - patient feels better, mild pedal edema.\par - tachycardia- baseline - regular\par - BP - she takes amlodipine 2.5 mg - might contribute to swelling, fluid retention\par - extend dexa 5 mg PO BID x 5 days after treatment to alleviate pain and decrease risk of swelling\par - might benefit from HCTZ, will follow up with PCP\par - reviewed side effects\par \par 8/11/2021\par Patient for cycle 3 TCHP\par felt much better after cycle 2, felt Zyrtec decreased bone pain\par Denies nausea, diarrhea\par Mouth sore resolved, cannot afford Magic Mouthwash- send for viscus lidocaine PRN and will use salt water\par weight back to baseline\par Trace of pedal edema resolved\par \par 9/22/2021\par TCHP cycle 5 today\par Concerned about weakness in legs - leg swelling, start diuretics\par if progression of LE weakness will hold docetaxel with cycle 6\par HTN - start HCTZ\par Patient working from home, feels tired. \par She doesn’t want vaccine for COVID. \par Insomnia - will try melatonin\par ECHO -report pending \par \par 10/13/2021\par IDC T2N0 on neoadjuvant chemotherapy\par Excellent clinical response\par Patient for TCHP  6/6, \par She is happy she is finishing chemotherapy part.\par C/o weakness in the legs and neuropathy - referred to PT and hold Docetaxel last dose\par Explained plan for MRI, surgical follow up with Dr. Shah \par \par Plan to continue Trastuzumab and pertuzumab q 3 weeks until surgery.\par \par Following surgery if there is complete pathological response patient will continue trastuzumab and pertuzumab every 3 weeks for total of 17 cycles ( including neoadjuvant treatment) .\par If there is no pCR patient will be changed to TDM-1 for 14 cycles\par \par 12/22/2021\par 11/19/2021 ([facility] pathology) left breast UOQ lumpectomy pathology: Breast, left, upper outer; lumpectomy:\par - Invasive ductal carcinoma, poorly differentiated, with definitive\par response to presurgical therapy, see synoptic report\par - Invasive carcinoma measures 9 mm in largest extent\par - Margins, as present in this specimen: Posterior: positive, for final posterior margin see part 6\par Anterior: 1 mm\par Medial: 2 mm, for final medial margin see part 3\par All remaining margins: over 5 mm\par - Biopsy site changes\par - Calcifications associated with wall of blood vessels\par SLNB pathology: 7. Axilla, left, non-sentinel nodes; biopsy:\par - One lymph node negative for metastatic carcinoma (0/1) 8. Axilla, left, sentinel node; biopsy: - One lymph node negative for metastatic carcinoma (0/1) - Biopsy site changes. \par -Pt here for Kadcyla #2- tolerating well \par -due for ECHO\par - Apt with Dr. Lombardo 12/28 for RT consult\par -ongoing weight loss- refer to nutritionist- defers any medication to stimulate appetite    \par - labs reviewed\par \par Kadcyla # 3/14 1/12/22\par Patient with neuropathy - LE, refer to neurologist for evaluation, referred to PT, OT.  Tingling sensation in LE, hands - feels less agile to draw. \par Option of treatment is fam-trastuzumab deruxtecan as patient high risk of recurrence and didn’t have CR after TCHP. There is reported neuropathy with Kadcyla in 21% and no evidence of neuropathy with Enhertu.\par patient went through COVID during Christmas, recovered well. ( declined vaccination) \par \par Patient completed RT, 5240 cGy 3/22\par She is willing to go back to TDM-1.  She will follow up next week for tx 3/30/2022 4/14\par ECHO - Jan 31/ 2022- no breathing issues, due for ECHO, referred, authorized, scheduled\par \par - patient cancelled appointment.  Stressed the importance of monitoring EF while on Kadcyla.\par Patient concerned about financial burden of the treatment. Referred for financial assistance, d/w , didn’t follow up with application.\par \par Reiterated with patient plan for total of 14 treatment with TDM-1 after surgery.  Tx # 11 / 14 today ( 8/3/2022). Patient frustrated with extend of the treatment states she was supposed to finish in June 2022.  Reviewed with patient that June 2022 plan was based on the initial plan if she would have CR to initial treatment and would only need to continue trastuzumba/ pertuzumab. Reviewed with patient indication for ongoing HER2 treatment based on Kendal study with change of the treatment to TDM-1 Kadcyla 3.6 mg/kg  x 14 q 3 weeks. \par Neuropathy - stable, denies imbalance, pain at night.  Referred to [facility] group at [facility].\par Echo done and reviewed 5/24/22  patient c/o joint aches and pain\par check cbc and chem,

## 2022-08-03 NOTE — HISTORY OF PRESENT ILLNESS
[de-identified] : 61 year old female presents today for initial consultation of breast cancer, referred by Dr. Shah.  She was sent for diagnostic mammogram for which she felt a left upper quadrant palpalbe mass x 4 months.  \par \par Most recent mammo 5/6/21: showed left 2:00 highly suspicious mass and prominent left axillary lymph node for which an US guided core biopsy was recommended \par \par 5/17/21: left breast US guide core biopsy showed invasive poorly differentiated ductal carcinoma associated with chronic inflammatory infiltrate at the left 12:00 position. The left axillary lymph node biopsy demonstrated a reactive lymph node, negative for carcinoma. These findings are concordant with imaging. ER- [place]-, Her 2 amplified FISH. Left axillary LN- reactive on pathology.\par \par \par Risk Factors:\par Age at menarche- 11\par Age at menopause-49\par G6,P6\par Age at first live birth-17\par OCP-denies\par HRT-denies\par Family History-denies\par Genetics-denies\par Smoker-former smoker\par \par 9/22/2021\par Patient cycle 5 TCHP- c/o fatigue, trace of pedal edema. ECHO pending [de-identified] : ER -neg, SC neg, HER 2 amplified  [de-identified] : Patient presents today for follow up of breast cancer- here for treatment

## 2022-08-04 LAB — CEA SERPL-MCNC: 1.6 NG/ML

## 2022-08-05 LAB — CANCER AG27-29 SERPL-ACNC: 28.4 U/ML

## 2022-08-17 ENCOUNTER — APPOINTMENT (OUTPATIENT)
Dept: BREAST CENTER | Facility: CLINIC | Age: 62
End: 2022-08-17

## 2022-08-18 ENCOUNTER — OUTPATIENT (OUTPATIENT)
Dept: OUTPATIENT SERVICES | Facility: HOSPITAL | Age: 62
LOS: 1 days | End: 2022-08-18

## 2022-08-18 DIAGNOSIS — C50.912 MALIGNANT NEOPLASM OF UNSPECIFIED SITE OF LEFT FEMALE BREAST: ICD-10-CM

## 2022-08-26 ENCOUNTER — APPOINTMENT (OUTPATIENT)
Dept: INFUSION THERAPY | Facility: CLINIC | Age: 62
End: 2022-08-26

## 2022-08-26 ENCOUNTER — OUTPATIENT (OUTPATIENT)
Dept: OUTPATIENT SERVICES | Facility: HOSPITAL | Age: 62
LOS: 1 days | End: 2022-08-26
Payer: COMMERCIAL

## 2022-08-26 VITALS
HEIGHT: 65 IN | TEMPERATURE: 98 F | RESPIRATION RATE: 19 BRPM | DIASTOLIC BLOOD PRESSURE: 76 MMHG | OXYGEN SATURATION: 100 % | WEIGHT: 149.03 LBS | HEART RATE: 76 BPM | SYSTOLIC BLOOD PRESSURE: 136 MMHG

## 2022-08-26 DIAGNOSIS — C50.912 MALIGNANT NEOPLASM OF UNSPECIFIED SITE OF LEFT FEMALE BREAST: ICD-10-CM

## 2022-08-26 LAB
ALBUMIN SERPL ELPH-MCNC: 3.3 G/DL — SIGNIFICANT CHANGE UP (ref 3.3–5)
ALP SERPL-CCNC: 70 U/L — SIGNIFICANT CHANGE UP (ref 40–120)
ALT FLD-CCNC: 24 U/L — SIGNIFICANT CHANGE UP (ref 10–45)
ANION GAP SERPL CALC-SCNC: 13 MMOL/L — SIGNIFICANT CHANGE UP (ref 5–17)
AST SERPL-CCNC: 30 U/L — SIGNIFICANT CHANGE UP (ref 10–40)
BILIRUB SERPL-MCNC: 0.8 MG/DL — SIGNIFICANT CHANGE UP (ref 0.2–1.2)
BUN SERPL-MCNC: 14 MG/DL — SIGNIFICANT CHANGE UP (ref 7–23)
CALCIUM SERPL-MCNC: 9.7 MG/DL — SIGNIFICANT CHANGE UP (ref 8.4–10.5)
CHLORIDE SERPL-SCNC: 106 MMOL/L — SIGNIFICANT CHANGE UP (ref 96–108)
CO2 SERPL-SCNC: 27 MMOL/L — SIGNIFICANT CHANGE UP (ref 22–31)
CREAT SERPL-MCNC: 0.8 MG/DL — SIGNIFICANT CHANGE UP (ref 0.5–1.3)
EGFR: 83 ML/MIN/1.73M2 — SIGNIFICANT CHANGE UP
GLUCOSE SERPL-MCNC: 107 MG/DL — HIGH (ref 70–99)
HCT VFR BLD CALC: 36.5 % — SIGNIFICANT CHANGE UP (ref 34.5–45)
HGB BLD-MCNC: 11.9 G/DL — SIGNIFICANT CHANGE UP (ref 11.5–15.5)
LYMPHOCYTES # BLD AUTO: 2.6 K/UL — SIGNIFICANT CHANGE UP (ref 1–3.3)
LYMPHOCYTES # BLD AUTO: 44.2 % — HIGH (ref 13–44)
MCHC RBC-ENTMCNC: 27.4 PG — SIGNIFICANT CHANGE UP (ref 27–34)
MCHC RBC-ENTMCNC: 32.6 GM/DL — SIGNIFICANT CHANGE UP (ref 32–36)
MCV RBC AUTO: 84.1 FL — SIGNIFICANT CHANGE UP (ref 80–100)
NEUTROPHILS # BLD AUTO: 2.9 K/UL — SIGNIFICANT CHANGE UP (ref 1.8–7.4)
NEUTROPHILS NFR BLD AUTO: 48.9 % — SIGNIFICANT CHANGE UP (ref 43–77)
PLATELET # BLD AUTO: 198 K/UL — SIGNIFICANT CHANGE UP (ref 150–400)
POTASSIUM SERPL-MCNC: 4 MMOL/L — SIGNIFICANT CHANGE UP (ref 3.5–5.3)
POTASSIUM SERPL-SCNC: 4 MMOL/L — SIGNIFICANT CHANGE UP (ref 3.5–5.3)
PROT SERPL-MCNC: 7.6 G/DL — SIGNIFICANT CHANGE UP (ref 6–8.3)
RBC # BLD: 4.34 M/UL — SIGNIFICANT CHANGE UP (ref 3.8–5.2)
RBC # FLD: 13.6 % — SIGNIFICANT CHANGE UP (ref 10.3–14.5)
SARS-COV-2 RNA SPEC QL NAA+PROBE: NEGATIVE — SIGNIFICANT CHANGE UP
SODIUM SERPL-SCNC: 146 MMOL/L — HIGH (ref 135–145)
WBC # BLD: 5.9 K/UL — SIGNIFICANT CHANGE UP (ref 3.8–10.5)
WBC # FLD AUTO: 5.9 K/UL — SIGNIFICANT CHANGE UP (ref 3.8–10.5)

## 2022-08-26 PROCEDURE — 80053 COMPREHEN METABOLIC PANEL: CPT

## 2022-08-26 PROCEDURE — 96413 CHEMO IV INFUSION 1 HR: CPT

## 2022-08-26 PROCEDURE — 85025 COMPLETE CBC W/AUTO DIFF WBC: CPT

## 2022-08-26 PROCEDURE — U0003: CPT

## 2022-08-26 PROCEDURE — 36415 COLL VENOUS BLD VENIPUNCTURE: CPT

## 2022-08-26 RX ORDER — ADO-TRASTUZUMAB EMTANSINE 20 MG/ML
252 INJECTION, POWDER, LYOPHILIZED, FOR SOLUTION INTRAVENOUS ONCE
Refills: 0 | Status: COMPLETED | OUTPATIENT
Start: 2022-08-26 | End: 2022-08-26

## 2022-08-26 RX ADMIN — ADO-TRASTUZUMAB EMTANSINE 252 MILLIGRAM(S): 20 INJECTION, POWDER, LYOPHILIZED, FOR SOLUTION INTRAVENOUS at 13:41

## 2022-08-26 RX ADMIN — ADO-TRASTUZUMAB EMTANSINE 252 MILLIGRAM(S): 20 INJECTION, POWDER, LYOPHILIZED, FOR SOLUTION INTRAVENOUS at 14:30

## 2022-09-02 ENCOUNTER — OUTPATIENT (OUTPATIENT)
Dept: OUTPATIENT SERVICES | Facility: HOSPITAL | Age: 62
LOS: 1 days | End: 2022-09-02

## 2022-09-02 DIAGNOSIS — C50.912 MALIGNANT NEOPLASM OF UNSPECIFIED SITE OF LEFT FEMALE BREAST: ICD-10-CM

## 2022-09-14 ENCOUNTER — APPOINTMENT (OUTPATIENT)
Dept: HEMATOLOGY ONCOLOGY | Facility: CLINIC | Age: 62
End: 2022-09-14

## 2022-09-14 ENCOUNTER — APPOINTMENT (OUTPATIENT)
Age: 62
End: 2022-09-14

## 2022-09-14 ENCOUNTER — APPOINTMENT (OUTPATIENT)
Dept: INFUSION THERAPY | Facility: CLINIC | Age: 62
End: 2022-09-14

## 2022-09-14 DIAGNOSIS — I10 ESSENTIAL (PRIMARY) HYPERTENSION: ICD-10-CM

## 2022-09-14 PROCEDURE — 99214 OFFICE O/P EST MOD 30 MIN: CPT | Mod: 25,95

## 2022-09-14 NOTE — ASSESSMENT
[FreeTextEntry1] : 60 yo AA female referred by Dr. Tello Shah for evaluation of newly diagnosed left breast cancer - at least 2.9 cm, \par IDC, ER neg, MO neg, HER2 patricia IHC equivocal, FISH amplified.  Left axilla LN negative.\par \par Patient accompanied by her . We reviewed the pathobiology of breast cancer, indication for NACT with HER2 targeted treatment.  Patient offered TCHP based on the TRYPHAENA protocol. \par \par Side effects and schema reviewed with patient. \par \par TCHP x 6 6/30/2021- 10/13/2021\par \par ECHO 6/24/2021- LVEF 55%\par Plan to continue Trastuzumab and pertuzumab q 3 weeks until surgery.\par \par 11/19/2021 ([facility] pathology) left breast UOQ lumpectomy pathology: [id number]\par \par  Kadcyla 11/22- \par Patient with neuropathy - LE, refer to neurologist for evaluation, referred to PT, OT.  Tingling sensation in LE, hands - feels less agile to draw. \par Option of treatment is fam-trastuzumab deruxtecan as patient high risk of recurrence and didn’t have CR after TCHP. There is reported neuropathy with Kadcyla in 21% \par \par Patient completed RT, 5240 cGy 3/22\par She is willing to go back to TDM-1.  She will follow up next week for tx 3/30/2022 4/14\par ECHO - Jan 31/ 2022- no breathing issues, due for ECHO, referred, authorized, scheduled\par \par - patient cancelled appointment.  Stressed the importance of monitoring EF while on Kadcyla.\par Patient concerned about financial burden of the treatment. Referred for financial assistance, d/w , didn’t follow up with application.\par \par Reiterated with patient plan for total of 14 treatment with TDM-1 after surgery.  Tx # 11 / 14 today ( 8/3/2022). Patient frustrated with extend of the treatment states she was supposed to finish in June 2022.  Reviewed with patient that June 2022 plan was based on the initial plan if she would have CR to initial treatment and would only need to continue trastuzumba/ pertuzumab. Reviewed with patient indication for ongoing HER2 treatment based on Kendal study with change of the treatment to TDM-1 Kadcyla 3.6 mg/kg  x 14 q 3 weeks. \par Neuropathy - stable, denies imbalance, pain at night.  Referred to support group at [facility].\par Echo done and reviewed 5/24/22  patient c/o joint aches and pain\par \par 9/14/2022- telehealth- patient needs # 13 and 14 treatment - clinically asymptomatic, denies SOB, last ECHO 5/24/22,  had stroke and unable to go for ECHO.  C/o neuropathy ( didn’t go to neurology), denies difficulty with balance.  C/o bone pains and aches.  She diagnosed with OA.  Schedule the last two treatments of Kadcyla 9/15/22 and 10/5/22.  Follow up 6 weeks later.  \par

## 2022-09-14 NOTE — HISTORY OF PRESENT ILLNESS
[de-identified] : 61 year old female presents today for initial consultation of breast cancer, referred by Dr. Shah.  She was sent for diagnostic mammogram for which she felt a left upper quadrant palpalbe mass x 4 months.  \par \par Most recent mammo 5/6/21: showed left 2:00 highly suspicious mass and prominent left axillary lymph node for which an US guided core biopsy was recommended \par \par 5/17/21: left breast US guide core biopsy showed invasive poorly differentiated ductal carcinoma associated with chronic inflammatory infiltrate at the left 12:00 position. The left axillary lymph node biopsy demonstrated a reactive lymph node, negative for carcinoma. These findings are concordant with imaging. ER- [place]-, Her 2 amplified FISH. Left axillary LN- reactive on pathology.\par \par \par Risk Factors:\par Age at menarche- 11\par Age at menopause-49\par G6,P6\par Age at first live birth-17\par OCP-denies\par HRT-denies\par Family History-denies\par Genetics-denies\par Smoker-former smoker\par \par 9/22/2021\par Patient cycle 5 TCHP- c/o fatigue, trace of pedal edema. ECHO pending [de-identified] : ER -neg, SC neg, HER 2 amplified  [de-identified] : Patient presents today for follow up of breast cancer- here for treatment

## 2022-09-16 ENCOUNTER — APPOINTMENT (OUTPATIENT)
Dept: INFUSION THERAPY | Facility: CLINIC | Age: 62
End: 2022-09-16

## 2022-09-16 ENCOUNTER — OUTPATIENT (OUTPATIENT)
Dept: OUTPATIENT SERVICES | Facility: HOSPITAL | Age: 62
LOS: 1 days | End: 2022-09-16
Payer: COMMERCIAL

## 2022-09-16 VITALS
SYSTOLIC BLOOD PRESSURE: 122 MMHG | OXYGEN SATURATION: 99 % | RESPIRATION RATE: 16 BRPM | HEART RATE: 78 BPM | DIASTOLIC BLOOD PRESSURE: 78 MMHG | TEMPERATURE: 98 F

## 2022-09-16 DIAGNOSIS — C50.912 MALIGNANT NEOPLASM OF UNSPECIFIED SITE OF LEFT FEMALE BREAST: ICD-10-CM

## 2022-09-16 LAB
ALBUMIN SERPL ELPH-MCNC: 3.5 G/DL — SIGNIFICANT CHANGE UP (ref 3.3–5)
ALP SERPL-CCNC: 73 U/L — SIGNIFICANT CHANGE UP (ref 40–120)
ALT FLD-CCNC: 36 U/L — SIGNIFICANT CHANGE UP (ref 10–45)
ANION GAP SERPL CALC-SCNC: 12 MMOL/L — SIGNIFICANT CHANGE UP (ref 5–17)
AST SERPL-CCNC: 33 U/L — SIGNIFICANT CHANGE UP (ref 10–40)
BILIRUB SERPL-MCNC: 1 MG/DL — SIGNIFICANT CHANGE UP (ref 0.2–1.2)
BUN SERPL-MCNC: 13 MG/DL — SIGNIFICANT CHANGE UP (ref 7–23)
CALCIUM SERPL-MCNC: 9.3 MG/DL — SIGNIFICANT CHANGE UP (ref 8.4–10.5)
CHLORIDE SERPL-SCNC: 105 MMOL/L — SIGNIFICANT CHANGE UP (ref 96–108)
CO2 SERPL-SCNC: 26 MMOL/L — SIGNIFICANT CHANGE UP (ref 22–31)
CREAT SERPL-MCNC: 0.6 MG/DL — SIGNIFICANT CHANGE UP (ref 0.5–1.3)
EGFR: 101 ML/MIN/1.73M2 — SIGNIFICANT CHANGE UP
GLUCOSE SERPL-MCNC: 97 MG/DL — SIGNIFICANT CHANGE UP (ref 70–99)
HCT VFR BLD CALC: 36.8 % — SIGNIFICANT CHANGE UP (ref 34.5–45)
HGB BLD-MCNC: 11.8 G/DL — SIGNIFICANT CHANGE UP (ref 11.5–15.5)
LYMPHOCYTES # BLD AUTO: 3.1 K/UL — SIGNIFICANT CHANGE UP (ref 1–3.3)
LYMPHOCYTES # BLD AUTO: 53.6 % — HIGH (ref 13–44)
MCHC RBC-ENTMCNC: 26.9 PG — LOW (ref 27–34)
MCHC RBC-ENTMCNC: 32.1 GM/DL — SIGNIFICANT CHANGE UP (ref 32–36)
MCV RBC AUTO: 84 FL — SIGNIFICANT CHANGE UP (ref 80–100)
NEUTROPHILS # BLD AUTO: 2.2 K/UL — SIGNIFICANT CHANGE UP (ref 1.8–7.4)
NEUTROPHILS NFR BLD AUTO: 39.7 % — LOW (ref 43–77)
PLATELET # BLD AUTO: 201 K/UL — SIGNIFICANT CHANGE UP (ref 150–400)
POTASSIUM SERPL-MCNC: 4 MMOL/L — SIGNIFICANT CHANGE UP (ref 3.5–5.3)
POTASSIUM SERPL-SCNC: 4 MMOL/L — SIGNIFICANT CHANGE UP (ref 3.5–5.3)
PROT SERPL-MCNC: 7.4 G/DL — SIGNIFICANT CHANGE UP (ref 6–8.3)
RBC # BLD: 4.38 M/UL — SIGNIFICANT CHANGE UP (ref 3.8–5.2)
RBC # FLD: 13.6 % — SIGNIFICANT CHANGE UP (ref 10.3–14.5)
SARS-COV-2 RNA SPEC QL NAA+PROBE: NEGATIVE — SIGNIFICANT CHANGE UP
SODIUM SERPL-SCNC: 143 MMOL/L — SIGNIFICANT CHANGE UP (ref 135–145)
WBC # BLD: 5.7 K/UL — SIGNIFICANT CHANGE UP (ref 3.8–10.5)
WBC # FLD AUTO: 5.7 K/UL — SIGNIFICANT CHANGE UP (ref 3.8–10.5)

## 2022-09-16 PROCEDURE — U0003: CPT

## 2022-09-16 PROCEDURE — 96413 CHEMO IV INFUSION 1 HR: CPT

## 2022-09-16 PROCEDURE — 85025 COMPLETE CBC W/AUTO DIFF WBC: CPT

## 2022-09-16 PROCEDURE — 36415 COLL VENOUS BLD VENIPUNCTURE: CPT

## 2022-09-16 PROCEDURE — 80053 COMPREHEN METABOLIC PANEL: CPT

## 2022-09-16 RX ORDER — ADO-TRASTUZUMAB EMTANSINE 20 MG/ML
252 INJECTION, POWDER, LYOPHILIZED, FOR SOLUTION INTRAVENOUS ONCE
Refills: 0 | Status: COMPLETED | OUTPATIENT
Start: 2022-09-16 | End: 2022-09-16

## 2022-09-16 RX ADMIN — ADO-TRASTUZUMAB EMTANSINE 252 MILLIGRAM(S): 20 INJECTION, POWDER, LYOPHILIZED, FOR SOLUTION INTRAVENOUS at 13:53

## 2022-09-16 RX ADMIN — ADO-TRASTUZUMAB EMTANSINE 252 MILLIGRAM(S): 20 INJECTION, POWDER, LYOPHILIZED, FOR SOLUTION INTRAVENOUS at 13:23

## 2022-10-12 ENCOUNTER — OUTPATIENT (OUTPATIENT)
Dept: OUTPATIENT SERVICES | Facility: HOSPITAL | Age: 62
LOS: 1 days | End: 2022-10-12
Payer: COMMERCIAL

## 2022-10-12 ENCOUNTER — APPOINTMENT (OUTPATIENT)
Dept: INFUSION THERAPY | Facility: CLINIC | Age: 62
End: 2022-10-12

## 2022-10-12 ENCOUNTER — NON-APPOINTMENT (OUTPATIENT)
Age: 62
End: 2022-10-12

## 2022-10-12 VITALS
OXYGEN SATURATION: 98 % | WEIGHT: 145.95 LBS | SYSTOLIC BLOOD PRESSURE: 126 MMHG | DIASTOLIC BLOOD PRESSURE: 71 MMHG | RESPIRATION RATE: 16 BRPM | TEMPERATURE: 97 F | HEIGHT: 65 IN | HEART RATE: 61 BPM

## 2022-10-12 VITALS
RESPIRATION RATE: 16 BRPM | DIASTOLIC BLOOD PRESSURE: 68 MMHG | HEART RATE: 64 BPM | OXYGEN SATURATION: 98 % | TEMPERATURE: 97 F | SYSTOLIC BLOOD PRESSURE: 118 MMHG

## 2022-10-12 DIAGNOSIS — C50.912 MALIGNANT NEOPLASM OF UNSPECIFIED SITE OF LEFT FEMALE BREAST: ICD-10-CM

## 2022-10-12 LAB
ALBUMIN SERPL ELPH-MCNC: 3.3 G/DL — SIGNIFICANT CHANGE UP (ref 3.3–5)
ALP SERPL-CCNC: 80 U/L — SIGNIFICANT CHANGE UP (ref 40–120)
ALT FLD-CCNC: 26 U/L — SIGNIFICANT CHANGE UP (ref 10–45)
ANION GAP SERPL CALC-SCNC: 14 MMOL/L — SIGNIFICANT CHANGE UP (ref 5–17)
AST SERPL-CCNC: 28 U/L — SIGNIFICANT CHANGE UP (ref 10–40)
BILIRUB SERPL-MCNC: 0.8 MG/DL — SIGNIFICANT CHANGE UP (ref 0.2–1.2)
BUN SERPL-MCNC: 12 MG/DL — SIGNIFICANT CHANGE UP (ref 7–23)
CALCIUM SERPL-MCNC: 9.2 MG/DL — SIGNIFICANT CHANGE UP (ref 8.4–10.5)
CHLORIDE SERPL-SCNC: 105 MMOL/L — SIGNIFICANT CHANGE UP (ref 96–108)
CO2 SERPL-SCNC: 27 MMOL/L — SIGNIFICANT CHANGE UP (ref 22–31)
CREAT SERPL-MCNC: 0.6 MG/DL — SIGNIFICANT CHANGE UP (ref 0.5–1.3)
EGFR: 101 ML/MIN/1.73M2 — SIGNIFICANT CHANGE UP
GLUCOSE SERPL-MCNC: 112 MG/DL — HIGH (ref 70–99)
HCT VFR BLD CALC: 36.6 % — SIGNIFICANT CHANGE UP (ref 34.5–45)
HGB BLD-MCNC: 11.6 G/DL — SIGNIFICANT CHANGE UP (ref 11.5–15.5)
LYMPHOCYTES # BLD AUTO: 2.8 K/UL — SIGNIFICANT CHANGE UP (ref 1–3.3)
LYMPHOCYTES # BLD AUTO: 50.3 % — HIGH (ref 13–44)
MCHC RBC-ENTMCNC: 26.8 PG — LOW (ref 27–34)
MCHC RBC-ENTMCNC: 31.7 GM/DL — LOW (ref 32–36)
MCV RBC AUTO: 84.5 FL — SIGNIFICANT CHANGE UP (ref 80–100)
NEUTROPHILS # BLD AUTO: 2.4 K/UL — SIGNIFICANT CHANGE UP (ref 1.8–7.4)
NEUTROPHILS NFR BLD AUTO: 42.1 % — LOW (ref 43–77)
PLATELET # BLD AUTO: 182 K/UL — SIGNIFICANT CHANGE UP (ref 150–400)
POTASSIUM SERPL-MCNC: 3.8 MMOL/L — SIGNIFICANT CHANGE UP (ref 3.5–5.3)
POTASSIUM SERPL-SCNC: 3.8 MMOL/L — SIGNIFICANT CHANGE UP (ref 3.5–5.3)
PROT SERPL-MCNC: 7.3 G/DL — SIGNIFICANT CHANGE UP (ref 6–8.3)
RBC # BLD: 4.33 M/UL — SIGNIFICANT CHANGE UP (ref 3.8–5.2)
RBC # FLD: 13.8 % — SIGNIFICANT CHANGE UP (ref 10.3–14.5)
SODIUM SERPL-SCNC: 146 MMOL/L — HIGH (ref 135–145)
WBC # BLD: 5.6 K/UL — SIGNIFICANT CHANGE UP (ref 3.8–10.5)
WBC # FLD AUTO: 5.6 K/UL — SIGNIFICANT CHANGE UP (ref 3.8–10.5)

## 2022-10-12 PROCEDURE — 80053 COMPREHEN METABOLIC PANEL: CPT

## 2022-10-12 PROCEDURE — 85025 COMPLETE CBC W/AUTO DIFF WBC: CPT

## 2022-10-12 PROCEDURE — 36415 COLL VENOUS BLD VENIPUNCTURE: CPT

## 2022-10-12 PROCEDURE — 96413 CHEMO IV INFUSION 1 HR: CPT

## 2022-10-12 RX ORDER — ADO-TRASTUZUMAB EMTANSINE 20 MG/ML
252 INJECTION, POWDER, LYOPHILIZED, FOR SOLUTION INTRAVENOUS ONCE
Refills: 0 | Status: COMPLETED | OUTPATIENT
Start: 2022-10-12 | End: 2022-10-12

## 2022-10-12 RX ADMIN — ADO-TRASTUZUMAB EMTANSINE 252 MILLIGRAM(S): 20 INJECTION, POWDER, LYOPHILIZED, FOR SOLUTION INTRAVENOUS at 13:39

## 2022-10-12 RX ADMIN — ADO-TRASTUZUMAB EMTANSINE 252 MILLIGRAM(S): 20 INJECTION, POWDER, LYOPHILIZED, FOR SOLUTION INTRAVENOUS at 14:10

## 2022-10-28 ENCOUNTER — APPOINTMENT (OUTPATIENT)
Dept: RADIATION ONCOLOGY | Facility: CLINIC | Age: 62
End: 2022-10-28

## 2022-10-28 PROCEDURE — 99443: CPT

## 2022-10-28 NOTE — REASON FOR VISIT
[Home] : at home, [unfilled] , at the time of the visit. [Medical Office: ([facility])___] : at the medical office located in  [Verbal consent obtained from patient] : the patient, [unfilled] [Post-Treatment Evaluation] : post-treatment evaluation for [Breast Cancer] : breast cancer

## 2022-10-31 NOTE — DISEASE MANAGEMENT
[Pathological] : TNM Stage: p [IA] : IA [NTNM] : 0 [TTNM] : 1b [MTNM] : 0 [de-identified] : 5240 cGy [de-identified] : left breast

## 2022-10-31 NOTE — REVIEW OF SYSTEMS
[Negative] : Allergic/Immunologic [Dysphagia: Grade 0] : Dysphagia: Grade 0 [Cough: Grade 0] : Cough: Grade 0 [Alopecia: Grade 0] : Alopecia: Grade 0 [Pruritus: Grade 0] : Pruritus: Grade 0 [Skin Atrophy: Grade 0] : Skin Atrophy: Grade 0 [Skin Hyperpigmentation: Grade 1 - Hyperpigmentation covering <10% BSA; no psychosocial impact] : Skin Hyperpigmentation: Grade 1 - Hyperpigmentation covering <10% BSA; no psychosocial impact [Skin Induration: Grade 0] : Skin Induration: Grade 0 [Dermatitis Radiation: Grade 1 - Faint erythema or dry desquamation] : Dermatitis Radiation: Grade 1 - Faint erythema or dry desquamation [FreeTextEntry5] : HTN [de-identified] : left lumpectomy for breast cancer

## 2022-10-31 NOTE — END OF VISIT
[] : Resident [Time Spent: ___ minutes] : I have spent [unfilled] minutes of time on the encounter. [FreeTextEntry3] : BP: Agree with above. RTC in 6m.  [>50% of the face to face encounter time was spent on counseling and/or coordination of care for ___] : Greater than 50% of the face to face encounter time was spent on counseling and/or coordination of care for [unfilled]

## 2022-10-31 NOTE — HISTORY OF PRESENT ILLNESS
[FreeTextEntry1] : Ms. Brigida Pedroza is a 61 year old female diagnosed with cT2N0 invasive breast cancer, status post marcela-adjuvant chemotherapy with TCHP 6/6, status post Left sided lumpectomy on 11/19/21 revealing LEFT breast 9mm poorly differentiated IDC, (ER-, OH-, HER2+), [id number], Stage 1A, with definitive response to presurgical chemo with negative margins and neg SLNBx (0/2). She had declined genetic testing. She plans to continue Herceptin/Perjeta q3 weeks for 17 cycles. \par \par She completed 5240 cGy /20 Fx of radiation to left breast in 3/2022. \par \par 5/6/21: Julián DXMG & US ([facility]): heterogeneously dense, L - 2.6cm ill-defined mass in reigon of palpable concern, inferior areas of asymmetry which efface, US - L - 2.9cm ill-defined hypoechoic mass 2:00 5FN, 0.2cm complicated cyst 11:00 6FN. BIRADS 5.\par 5/12/21: L US Guided bx x2 ([facility]): "R shaped clip" L 2:00 15FN - 1.2cm invasive ductal carcinoma w/ assoc chronic inflammatory infiltrate, poorly differentiated , ER (-), OH (-), HER2 Equivocal, FISH (+) L 1.1cm axillary LN 18FN - "S Shaped Clip" reactive LN, negative for carcinoma \par 6/18/2021 ([facility]) bilateral breast MRI showing 1. The index malignancy in the 2:00 axis of the left breast measures 3.4 cm x 2.3 cm x 2.4 cm. 2. A 3 mm focus of enhancement in the 5:00 axis of the left breast posterior depth is suspicious for malignancy. MR guided core needle biopsy is recommended.\par 3. Incidentally seen is a 2.4 cm T2 bright lesion in the posterior right hepatic dome. Abdominal ultrasound is recommended. MRI guided biopsy of left 5:00 axis recommended. BIRADS 4\par \par She was placed on marcela-adjuvant chemotherapy with TCHP 6/6 with Dr. Toribio, completed on 10/13/21.\par \par 10/19/2021 ([facility]) bilateral breast MRI 1. Post neoadjuvant chemotherapy, significant decreased in size of index lesion, left breast 2:00 axis, residual foci of enhancement measuring in total 1.3 x 0.8 cm surrounding the biopsy clip. 2. Interval resolution of the left breast 5:00 axis suspicious focus of enhancement. 3. T2 hyperintense lesion in the right hepatic dome, likely hepatic cyst. No change. 4. No MRI evidence of right breast malignancy. \par \par 11/19/2021 ([facility] pathology) left breast UOQ lumpectomy pathology: Breast, left, upper outer; lumpectomy:\par Final Diagnosis   (Stage [id number], Negative margin)\par 1. Breast, left, upper outer; lumpectomy: - Invasive ductal carcinoma, poorly differentiated, with definitive response to presurgical therapy, see synoptic report- Invasive carcinoma measures 9 mm in largest extent - Margins, as present in this specimen:\par Posterior: positive, for final posterior margin see part 6     Anterior: 1 mm Medial: 2 mm, for final medial margin see part 3   All remaining margins: over 5 mm  - Biopsy site changes   - Calcifications associated with wall of blood vessels\par 2. Breast, left, superior margin; excision:  - Benign breast tissue\par 3. Breast, left, medial margin; excision: - Benign breast tissue\par 4. Breast, left, inferior margin; excision: - Benign breast tissue\par 5. Breast, left, lateral margin; excision: - Benign breast tissue with changes of prior procedure and portion of tumor bed area\par 6. Breast, left, deep margin; excision: - Benign fibroadipose tissue and skeletal muscle\par 7. Axilla, left, non-sentinel nodes; biopsy: - One lymph node negative for metastatic carcinoma (0/1)\par 8. Axilla, left, sentinel node; biopsy: - One lymph node negative for metastatic carcinoma (0/1)  - Biopsy site changes\par Note: Repeat testing for ER, OH and HER-2 will be reported in an addendum.\par Verified by: Diamond Barfield M.D.\par (Electronic Signature)\par Reported on: 11/23/21 14:21 [facility], [facility], [address],\[place] [address]\par Phone: [phone] Fax: [phone]\par \par 12/28/2021 Consultation @ [facility] - Overall, the patient notes that she is healing well from surgery. She denies a history of radiation therapy. She lives in [place], and expressed some anxiety related to transportation for treatment and was offered a consultation and treatment closer to home.\par \par Last OTV note during radiation in 3/2022: 20/20 fractions of radiation to left breast (prone) completed. Using skin cream over RT area for minor skin toxicity. No cough, SOB, chest pain\par \par Today for f/u. She completed 5240 cGy /20 Fx of radiation to left breast in 3/2022.  c/o occasional left breast pain, dark skin over left breast. No cough, dysphagia, chest pain SOB.  Mammography will be done in early 11/2022. Advised to continue f/u with Med Onc for Her2 positive breast cancer. medonc changed from Dr. toribio, encouraged to call new MD. Will see Dr. Shah as well. Chemo completed with neuropathy

## 2022-11-07 ENCOUNTER — APPOINTMENT (OUTPATIENT)
Dept: MAMMOGRAPHY | Facility: HOSPITAL | Age: 62
End: 2022-11-07

## 2022-11-07 ENCOUNTER — RESULT REVIEW (OUTPATIENT)
Age: 62
End: 2022-11-07

## 2022-11-07 ENCOUNTER — APPOINTMENT (OUTPATIENT)
Dept: ULTRASOUND IMAGING | Facility: HOSPITAL | Age: 62
End: 2022-11-07

## 2022-11-07 ENCOUNTER — OUTPATIENT (OUTPATIENT)
Dept: OUTPATIENT SERVICES | Facility: HOSPITAL | Age: 62
LOS: 1 days | End: 2022-11-07
Payer: COMMERCIAL

## 2022-11-07 PROCEDURE — 76641 ULTRASOUND BREAST COMPLETE: CPT | Mod: 26,50

## 2022-11-07 PROCEDURE — 77066 DX MAMMO INCL CAD BI: CPT | Mod: 26

## 2022-11-07 PROCEDURE — 77066 DX MAMMO INCL CAD BI: CPT

## 2022-11-07 PROCEDURE — 76641 ULTRASOUND BREAST COMPLETE: CPT

## 2022-11-07 PROCEDURE — G0279: CPT | Mod: 26

## 2022-11-07 PROCEDURE — G0279: CPT

## 2022-11-16 ENCOUNTER — APPOINTMENT (OUTPATIENT)
Dept: BREAST CENTER | Facility: CLINIC | Age: 62
End: 2022-11-16

## 2022-11-16 PROCEDURE — 93702 BIS XTRACELL FLUID ANALYSIS: CPT | Mod: NC

## 2022-11-16 PROCEDURE — 99213 OFFICE O/P EST LOW 20 MIN: CPT

## 2022-11-25 NOTE — DATA REVIEWED
[FreeTextEntry1] : 5/6/21: Julián DXMG & US ([facility]): heterogeneously dense, L - 2.6cm ill-defined mass in reigon of palpable concern, inferior areas of asymmetry which efface, US - L - 2.9cm ill-defined hypoechoic mass 2:00 5FN, 0.2cm complicated cyst 11:00 6FN. BIRADS 5.\par 5/12/21: L US Guided bx x2 ([facility]): "R shaped clip" L 2:00 15FN - 1.2cm invasive ductal carcinoma w/ assoc chronic inflammatory infiltrate, poorly differentiated , ER (-), SC (-), HER2 Equivocal, FISH (+) L 1.1cm axillary LN 18FN - "S Shaped Clip" reactive LN, negative for carcinoma \par \par 6/18/2021 ([facility]) bilateral breast MRI showing 1. The index malignancy in the 2:00 axis of the left breast measures 3.4 cm x 2.3 cm x 2.4 cm. 2. A 3 mm focus of enhancement in the 5:00 axis of the left breast posterior depth is suspicious for malignancy. MR guided core needle biopsy is recommended.\par 3. Incidentally seen is a 2.4 cm T2 bright lesion in the posterior right hepatic dome. Abdominal ultrasound is recommended. MRI guided biopsy of left 5:00 axis recommended. BIRADS 4\par \par 10/19/2021 ([facility]) bilateral breast MRI 1. Post neoadjuvant chemotherapy, significant decreased in size of index lesion, left breast 2:00 axis, residual foci of enhancement measuring in total 1.3 x 0.8 cm surrounding the biopsy clip. 2. Interval resolution of the left breast 5:00 axis suspicious focus of enhancement. 3. T2 hyperintense lesion in the right hepatic dome, likely hepatic cyst. No change.\par 4. No MRI evidence of right breast malignancy. \par \par 11/19/2021 ([facility] pathology) left breast UOQ lumpectomy pathology: Breast, left, upper outer; lumpectomy:\par - Invasive ductal carcinoma, poorly differentiated, with definitive\par response to presurgical therapy, see synoptic report\par - Invasive carcinoma measures 9 mm in largest extent\par - Margins, as present in this specimen: Posterior: positive, for final posterior margin see part 6\par Anterior: 1 mm\par Medial: 2 mm, for final medial margin see part 3\par All remaining margins: over 5 mm\par - Biopsy site changes\par - Calcifications associated with wall of blood vessels\par SLNB pathology: 7. Axilla, left, non-sentinel nodes; biopsy:\par - One lymph node negative for metastatic carcinoma (0/1) 8. Axilla, left, sentinel node; biopsy: - One lymph node negative for metastatic carcinoma (0/1) - Biopsy site changes \par \par 11/11/22 ([facility]): B/L DX MMG/US: scattered areas of fbg density. Expected posttreatment changes LEFT breast. Veraform marker LEFT UOQ. LEFT breast 2.00 15cmfn there is an uncomplicated seroma approximately 4.4 x 1.5 x 2.9cm. No mammographic or US evidence of malignancy. RECOMMENDATION: mammography in 1 year. BIRADS-2: Benign

## 2022-11-25 NOTE — HISTORY OF PRESENT ILLNESS
[FreeTextEntry1] : 62 yo female presents for repeat sozo measurement and 6 month follow-up of left 2:00 5cmFN IDC ER 0%, [place] 0%, HER-2 equivocal, FISH positive s/p L breast upper outer lumpectomy on 11/19/21 path showing poorly differentiated IDC measuring 0.9 cm with definitive response to presurgical therapy, NSLNB (0/1) SLNB (0/1). Of note, positive family history of breast cancer in maternal aunt age 50's and maternal cousin age 40's but patient has declined genetic testing. \par \par Completed TCHP on 10/13/2021 with med onc Dr. Fontenot, had received Herceptin/Perjeta [id number] until surgery, now currently receiving Kadcyla last treatment 10/5/22, patient experiencing neuropathy, has been referred to OT and [facility] support group. Completed 20/20 fractions of radiation to left breast (prone) on 3/15/2022 with rad onc Dr. Brennan. \par \par Patient denies palpable masses, nipple discharge, skin changes.\par B/L DX MMG/US 11/11/22 BIRADS-2. \par \par \par

## 2022-11-25 NOTE — ASSESSMENT
[FreeTextEntry1] : 62 yo female presents for repeat sozo measurement and 3 month follow-up of left 2:00 5cmFN IDC ER 0%, VA 0%, HER-2 equivocal, FISH positive s/p L breast upper outer lumpectomy on 11/19/21 path showing poorly differentiated IDC measuring .9 cm with definitive response to presurgical therapy, NSLNB (0/1) SLNB (0/1). Of note, positive family history of breast cancer in maternal aunt age 50's and maternal cousin age 40's but patient has declined genetic testing. S/p TCHP. S/p Herceptin/Perjeta. S/P XRT. Completed Kadcyla 10/5/2022\par \par Patient without complaint. Physical exam WNL. Sozo measurement obtained in office today, 8.0.  Reveiwed most recent imaging, B/L DX MMG/US 11/11/22 BIRADS-2. Plan for B/L MMG + US, re-examination and repeat sozo measurement in 6 months. Plan with medical oncology ok to remove Port. Patient verbalizes understanding and is in agreement with the plan.\par

## 2022-12-28 ENCOUNTER — APPOINTMENT (OUTPATIENT)
Dept: HEMATOLOGY ONCOLOGY | Facility: CLINIC | Age: 62
End: 2022-12-28
Payer: COMMERCIAL

## 2022-12-28 VITALS
RESPIRATION RATE: 18 BRPM | TEMPERATURE: 96.3 F | DIASTOLIC BLOOD PRESSURE: 78 MMHG | OXYGEN SATURATION: 98 % | WEIGHT: 153 LBS | HEIGHT: 62 IN | BODY MASS INDEX: 28.16 KG/M2 | HEART RATE: 96 BPM | SYSTOLIC BLOOD PRESSURE: 159 MMHG

## 2022-12-28 PROCEDURE — 99213 OFFICE O/P EST LOW 20 MIN: CPT

## 2022-12-28 RX ORDER — HYDROCHLOROTHIAZIDE 25 MG/1
25 TABLET ORAL
Qty: 30 | Refills: 1 | Status: DISCONTINUED | COMMUNITY
Start: 2021-09-22 | End: 2022-12-28

## 2022-12-30 NOTE — ASSESSMENT
[FreeTextEntry1] : 61 yo AA female referred by Dr. Tello Shah with a 3.4cm left breast mass on MRI, biopsy c/w ER/NH- Her2 positive by FISH (IHC 2+) IDC s/p neoadj TCHPx6 cycles completed 10/2021, s/p L breast lumpectomy in 11/2021 ypT1b (9mm IDC poorly differentiated)ypN0, s/p radiation therapy and Kadcyla x14 cycles completed on 10/2022 with grade 1-2 neuropathy. \par \par TCHP x 6 6/30/2021- 10/13/2021\par \par 11/19/2021 ([facility] pathology) left breast UOQ lumpectomy pathology: [id number]\par Kadcyla completed 10/2022. \par \par Echo done and reviewed 5/24/22; patient is overdue for surveillance Echo. To schedule for earliest available. Reviewed that currently Echos are recommended every 6 months for a total of 2yrs since completion of treatment. \par \par Last Mammo 11/2022 Birads 2, next recommended at a year's time 11/2023. \par \par Referral to GI provided for screening colonoscopy. \par Asked patient to follow up with PMD for all other chronic medical problems. \par \par RTC in 3-4  months for follow up.

## 2022-12-30 NOTE — REVIEW OF SYSTEMS
[Fatigue] : fatigue [Anxiety] : anxiety [Negative] : Allergic/Immunologic [Recent Change In Weight] : ~T no recent weight change [FreeTextEntry2] : achy/fatigue constantly  [de-identified] : neuropathy to bilateral hands and feet - patient states getting worse

## 2022-12-30 NOTE — PHYSICAL EXAM
[Fully active, able to carry on all pre-disease performance without restriction] : Status 0 - Fully active, able to carry on all pre-disease performance without restriction [Normal] : affect appropriate [de-identified] : s/p lumpectomy, no palpable lesions in either breast and axilla.  [de-identified] : trace pedal edema

## 2022-12-30 NOTE — HISTORY OF PRESENT ILLNESS
[Disease: _____________________] : Disease: [unfilled] [T: ___] : T[unfilled] [N: ___] : N[unfilled] [de-identified] : 61 year old female presents today for initial consultation of breast cancer, referred by Dr. Shah.  She was sent for diagnostic mammogram for which she felt a left upper quadrant palpable mass x 4 months.  \par \par  mammo 5/6/21: showed left 2:00 highly suspicious mass and prominent left axillary lymph node for which an US guided core biopsy was recommended \par \par 5/17/21: left breast US guide core biopsy showed invasive poorly differentiated ductal carcinoma associated with chronic inflammatory infiltrate at the left 12:00 position. The left axillary lymph node biopsy demonstrated a reactive lymph node, negative for carcinoma. These findings are concordant with imaging. ER- [place]-, Her 2 amplified FISH. Left axillary LN- reactive on pathology.\par \par \par Risk Factors:\par Age at menarche- 11\par Age at menopause-49\par G6,P6\par Age at first live birth-17\par OCP-denies\par HRT-denies\par Family History-denies\par Genetics-denies\par Smoker-former smoker\par \par  [de-identified] : ER -neg, LA neg, HER 2 amplified  [de-identified] : Patient presents today for follow up of breast cancer. \par \par Last treatment with Kadcyla #14 on 10/12/22, AE - neuropathy, mild, not impairing function. \par Patient eager to take a break from medical interventions, asking for port removal.

## 2023-01-18 ENCOUNTER — APPOINTMENT (OUTPATIENT)
Dept: INTERVENTIONAL RADIOLOGY/VASCULAR | Facility: HOSPITAL | Age: 63
End: 2023-01-18
Payer: COMMERCIAL

## 2023-01-18 ENCOUNTER — RESULT REVIEW (OUTPATIENT)
Age: 63
End: 2023-01-18

## 2023-01-18 ENCOUNTER — OUTPATIENT (OUTPATIENT)
Dept: OUTPATIENT SERVICES | Facility: HOSPITAL | Age: 63
LOS: 1 days | End: 2023-01-18
Payer: COMMERCIAL

## 2023-01-18 DIAGNOSIS — Z45.2 ENCOUNTER FOR ADJUSTMENT AND MANAGEMENT OF VASCULAR ACCESS DEVICE: ICD-10-CM

## 2023-01-18 PROCEDURE — 36590 REMOVAL TUNNELED CV CATH: CPT

## 2023-04-27 ENCOUNTER — APPOINTMENT (OUTPATIENT)
Dept: HEMATOLOGY ONCOLOGY | Facility: CLINIC | Age: 63
End: 2023-04-27
Payer: COMMERCIAL

## 2023-05-16 NOTE — REVIEW OF SYSTEMS
[Fatigue] : fatigue [Recent Change In Weight] : ~T no recent weight change [Anxiety] : anxiety [Negative] : Allergic/Immunologic [FreeTextEntry2] : achy/fatigue constantly  [de-identified] : neuropathy to bilateral hands and feet - patient states getting worse

## 2023-05-16 NOTE — HISTORY OF PRESENT ILLNESS
[Disease: _____________________] : Disease: [unfilled] [T: ___] : T[unfilled] [N: ___] : N[unfilled] [de-identified] : 62 year old female presents today for initial consultation of breast cancer, referred by Dr. Shah.  She was sent for diagnostic mammogram for which she felt a left upper quadrant palpable mass x 4 months. Here today for f/u. \par \par  mammo 5/6/21: showed left 2:00 highly suspicious mass and prominent left axillary lymph node for which an US guided core biopsy was recommended \par \par 5/17/21: left breast US guide core biopsy showed invasive poorly differentiated ductal carcinoma associated with chronic inflammatory infiltrate at the left 12:00 position. The left axillary lymph node biopsy demonstrated a reactive lymph node, negative for carcinoma. These findings are concordant with imaging. ER- [place]-, Her 2 amplified FISH. Left axillary LN- reactive on pathology.\par \par \par Risk Factors:\par Age at menarche- 11\par Age at menopause-49\par G6,P6\par Age at first live birth-17\par OCP-denies\par HRT-denies\par Family History-denies\par Genetics-denies\par Smoker-former smoker\par \par  [de-identified] : ER -neg, [place] neg, HER 2 amplified  [de-identified] : Patient presents today for follow up of breast cancer. \par \par Last treatment with Kadcyla #14 on 10/12/22, AE - neuropathy, mild, not impairing function. \par Patient eager to take a break from medical interventions, asking for port removal.

## 2023-05-16 NOTE — PHYSICAL EXAM
[Fully active, able to carry on all pre-disease performance without restriction] : Status 0 - Fully active, able to carry on all pre-disease performance without restriction [Normal] : affect appropriate [de-identified] : s/p lumpectomy, no palpable lesions in either breast and axilla.  [de-identified] : trace pedal edema

## 2023-05-16 NOTE — ASSESSMENT
[FreeTextEntry1] : 61 yo AA female referred by Dr. Tello Shah with a 3.4cm left breast mass on MRI, biopsy c/w ER/LA- Her2 positive by FISH (IHC 2+) IDC s/p neoadj TCHPx6 cycles completed 10/2021, s/p L breast lumpectomy in 11/2021 ypT1b (9mm IDC poorly differentiated)ypN0, s/p radiation therapy and Kadcyla x14 cycles completed on 10/2022 with grade 1-2 neuropathy. \par \par TCHP x 6 6/30/2021- 10/13/2021\par \par 11/19/2021 ([facility] pathology) left breast UOQ lumpectomy pathology: [id number]\par Kadcyla completed 10/2022. \par \par Echo done and reviewed 5/24/22; patient is overdue for surveillance Echo. To schedule for earliest available. Reviewed that currently Echos are recommended every 6 months for a total of 2yrs since completion of treatment. \par \par Last Mammo 11/2022 Birads 2, next recommended at a year's time 11/2023. \par \par Referral to GI provided for screening colonoscopy. \par Asked patient to follow up with PMD for all other chronic medical problems. \par \par RTC in 3-4  months for follow up.

## 2023-05-17 ENCOUNTER — APPOINTMENT (OUTPATIENT)
Dept: HEMATOLOGY ONCOLOGY | Facility: CLINIC | Age: 63
End: 2023-05-17
Payer: COMMERCIAL

## 2023-05-25 ENCOUNTER — RESULT REVIEW (OUTPATIENT)
Age: 63
End: 2023-05-25

## 2023-05-25 ENCOUNTER — OUTPATIENT (OUTPATIENT)
Dept: OUTPATIENT SERVICES | Facility: HOSPITAL | Age: 63
LOS: 1 days | End: 2023-05-25
Payer: COMMERCIAL

## 2023-05-25 ENCOUNTER — APPOINTMENT (OUTPATIENT)
Dept: MAMMOGRAPHY | Facility: HOSPITAL | Age: 63
End: 2023-05-25

## 2023-05-25 ENCOUNTER — APPOINTMENT (OUTPATIENT)
Dept: ULTRASOUND IMAGING | Facility: HOSPITAL | Age: 63
End: 2023-05-25
Payer: COMMERCIAL

## 2023-05-25 PROCEDURE — G0279: CPT

## 2023-05-25 PROCEDURE — G0279: CPT | Mod: 26

## 2023-05-25 PROCEDURE — 76641 ULTRASOUND BREAST COMPLETE: CPT

## 2023-05-25 PROCEDURE — 76641 ULTRASOUND BREAST COMPLETE: CPT | Mod: 26,50

## 2023-05-25 PROCEDURE — 77066 DX MAMMO INCL CAD BI: CPT | Mod: 26

## 2023-05-25 PROCEDURE — 77066 DX MAMMO INCL CAD BI: CPT

## 2023-06-07 ENCOUNTER — APPOINTMENT (OUTPATIENT)
Dept: BREAST CENTER | Facility: CLINIC | Age: 63
End: 2023-06-07
Payer: COMMERCIAL

## 2023-06-19 ENCOUNTER — APPOINTMENT (OUTPATIENT)
Dept: HEMATOLOGY ONCOLOGY | Facility: CLINIC | Age: 63
End: 2023-06-19
Payer: COMMERCIAL

## 2023-07-03 ENCOUNTER — APPOINTMENT (OUTPATIENT)
Dept: HEMATOLOGY ONCOLOGY | Facility: CLINIC | Age: 63
End: 2023-07-03
Payer: COMMERCIAL

## 2023-07-07 ENCOUNTER — APPOINTMENT (OUTPATIENT)
Dept: HEMATOLOGY ONCOLOGY | Facility: CLINIC | Age: 63
End: 2023-07-07
Payer: COMMERCIAL

## 2023-07-07 DIAGNOSIS — C50.912 MALIGNANT NEOPLASM OF UNSPECIFIED SITE OF LEFT FEMALE BREAST: ICD-10-CM

## 2023-07-07 DIAGNOSIS — Z17.1 ESTROGEN RECEPTOR NEGATIVE STATUS [ER-]: ICD-10-CM

## 2023-07-07 PROCEDURE — 99213 OFFICE O/P EST LOW 20 MIN: CPT | Mod: 95

## 2023-07-07 NOTE — REVIEW OF SYSTEMS
[Recent Change In Weight] : ~T no recent weight change [FreeTextEntry2] : achy/fatigue constantly  [de-identified] : neuropathy to bilateral hands and feet - patient states getting worse

## 2023-07-07 NOTE — ASSESSMENT
[FreeTextEntry1] : 63 yo AA female referred by Dr. Tello Shah with a 3.4cm left breast mass on MRI, biopsy c/w ER/ND- Her2 positive by FISH (IHC 2+) IDC s/p neoadj TCHPx6 cycles completed 10/2021, s/p L breast lumpectomy in 11/2021 ypT1b (9mm IDC poorly differentiated)ypN0, s/p radiation therapy and Kadcyla x14 cycles completed on 10/2022 with grade 1-2 neuropathy. \par \par TCHP x 6 6/30/2021- 10/13/2021\par \par 11/19/2021 ([facility] pathology) left breast UOQ lumpectomy pathology: [id number]\par Kadcyla completed 10/2022. \par AE - grade 1 neuropathy\par Reviewed acupuncture, and exercise for neuropathy as patient declined Gabapentin. \par \par Echo done and reviewed 5/24/22; patient is overdue for surveillance Echo. To schedule for earliest available. Reviewed that currently Echos are recommended every 6 months for a total of 2yrs since completion of treatment. \par \par Patient s/p visit with Dr Shah 6/2023, 5/2023 Mammo Birads 2. \par \par Referral to GI provided previously for screening colonoscopy. Reminded patient to see GI for screening colonoscopy.\par Asked patient to follow up with PMD for all other chronic medical problems. \par \par RTC in 4  months for follow up.

## 2023-07-07 NOTE — HISTORY OF PRESENT ILLNESS
[Home] : at home, [unfilled] , at the time of the visit. [Medical Office: ([facility])___] : at the medical office located in  [Verbal consent obtained from patient] : the patient, [unfilled] [de-identified] : 62 year old female presents today for initial consultation of breast cancer, referred by Dr. Shah.  She was sent for diagnostic mammogram for which she felt a left upper quadrant palpable mass x 4 months.  Here today for f/u TH\par \par  mammo 5/6/21: showed left 2:00 highly suspicious mass and prominent left axillary lymph node for which an US guided core biopsy was recommended \par \par 5/17/21: left breast US guide core biopsy showed invasive poorly differentiated ductal carcinoma associated with chronic inflammatory infiltrate at the left 12:00 position. The left axillary lymph node biopsy demonstrated a reactive lymph node, negative for carcinoma. These findings are concordant with imaging. ER- [place]-, Her 2 amplified FISH. Left axillary LN- reactive on pathology.\par \par 5/25/23 US/MAMMO\par No mammographic or sonographic evidence of malignancy.\par \par Risk Factors:\par Age at menarche- 11\par Age at menopause-49\par G6,P6\par Age at first live birth-17\par OCP-denies\par HRT-denies\par Family History-denies\par Genetics-denies\par Smoker-former smoker\par \par  [de-identified] : ER -neg, MA neg, HER 2 amplified  [de-identified] : Patient presents today for follow up of breast cancer. \par \par Last treatment with Kadcyla #14 on 10/12/22, AE - neuropathy, mild, not impairing function. \par \par \par Patient s/p visit with Dr Shah 6/2023, 5/2023 Birads 2.

## 2023-07-12 ENCOUNTER — APPOINTMENT (OUTPATIENT)
Dept: BREAST CENTER | Facility: CLINIC | Age: 63
End: 2023-07-12
Payer: COMMERCIAL

## 2023-07-12 VITALS
HEART RATE: 79 BPM | OXYGEN SATURATION: 99 % | SYSTOLIC BLOOD PRESSURE: 147 MMHG | HEIGHT: 62 IN | WEIGHT: 159 LBS | BODY MASS INDEX: 29.26 KG/M2 | DIASTOLIC BLOOD PRESSURE: 87 MMHG

## 2023-07-12 PROCEDURE — 93702 BIS XTRACELL FLUID ANALYSIS: CPT | Mod: NC

## 2023-07-12 PROCEDURE — 99213 OFFICE O/P EST LOW 20 MIN: CPT

## 2023-07-17 ENCOUNTER — NON-APPOINTMENT (OUTPATIENT)
Age: 63
End: 2023-07-17

## 2023-07-18 ENCOUNTER — NON-APPOINTMENT (OUTPATIENT)
Age: 63
End: 2023-07-18

## 2023-07-24 NOTE — HISTORY OF PRESENT ILLNESS
[FreeTextEntry1] : 63 yo female presents for repeat sozo measurement and 6 month follow-up of left 2:00 5cmFN IDC ER 0%, [place] 0%, HER-2 equivocal, FISH positive s/p L breast upper outer lumpectomy on 11/19/21 path showing poorly differentiated IDC measuring 0.9 cm with definitive response to presurgical therapy, NSLNB (0/1) SLNB (0/1). Of note, positive family history of breast cancer in maternal aunt age 50's and maternal cousin age 40's but patient has declined genetic testing. \par \par Completed neoadjuvant TCHP on 10/13/2021 with med onc Dr. 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 Completed 20/20 fractions of radiation to left breast (prone) on 3/15/2022 with rad onc Dr. Brennan. \par \par Patient denies palpable masses, nipple discharge, skin changes.\par B/L DX MMG/US 5/25/23 BIRADS-2. \par \par TNM Stage: p T 1b N 0 M 0 \par AJCC Stage (8th Ed): IA. \par

## 2023-07-24 NOTE — ASSESSMENT
[FreeTextEntry1] : 61 yo female presents for repeat sozo measurement and 6 month follow-up of left 2:00 5cmFN IDC ER 0%, [place] 0%, HER-2 equivocal, FISH positive s/p L breast upper outer lumpectomy on 11/19/21 path showing poorly differentiated IDC measuring .9 cm with definitive response to presurgical therapy, NSLNB (0/1) SLNB (0/1). Of note, positive family history of breast cancer in maternal aunt age 50's and maternal cousin age 40's but patient has declined genetic testing. S/p TCHP. S/p Herceptin/Perjeta. S/P XRT. Completed Kadcyla 10/5/2022. \par \par Patient without complaint. Physical exam WNL. Sozo measurement obtained in office today, WNL.  Reviewed most recent imaging, B/L DX MMG/US 5/25/23 BIRADS-2. Plan for re-examination and final sozo measurement in 6 months. Patient verbalizes understanding and is in agreement with the plan.\par

## 2023-07-24 NOTE — DATA REVIEWED
[FreeTextEntry1] : 5/6/21: Julián DXMG & US ([facility]): heterogeneously dense, L - 2.6cm ill-defined mass in reigon of palpable concern, inferior areas of asymmetry which efface, US - L - 2.9cm ill-defined hypoechoic mass 2:00 5FN, 0.2cm complicated cyst 11:00 6FN. BIRADS 5.\par 5/12/21: L US Guided bx x2 ([facility]): "R shaped clip" L 2:00 15FN - 1.2cm invasive ductal carcinoma w/ assoc chronic inflammatory infiltrate, poorly differentiated , ER (-), NE (-), HER2 Equivocal, FISH (+) L 1.1cm axillary LN 18FN - "S Shaped Clip" reactive LN, negative for carcinoma \par \par 6/18/2021 ([facility]) bilateral breast MRI showing 1. The index malignancy in the 2:00 axis of the left breast measures 3.4 cm x 2.3 cm x 2.4 cm. 2. A 3 mm focus of enhancement in the 5:00 axis of the left breast posterior depth is suspicious for malignancy. MR guided core needle biopsy is recommended.\par 3. Incidentally seen is a 2.4 cm T2 bright lesion in the posterior right hepatic dome. Abdominal ultrasound is recommended. MRI guided biopsy of left 5:00 axis recommended. BIRADS 4\par \par 10/19/2021 ([facility]) bilateral breast MRI 1. Post neoadjuvant chemotherapy, significant decreased in size of index lesion, left breast 2:00 axis, residual foci of enhancement measuring in total 1.3 x 0.8 cm surrounding the biopsy clip. 2. Interval resolution of the left breast 5:00 axis suspicious focus of enhancement. 3. T2 hyperintense lesion in the right hepatic dome, likely hepatic cyst. No change.\par 4. No MRI evidence of right breast malignancy. \par \par 11/19/2021 ([facility] pathology) left breast UOQ lumpectomy pathology: Breast, left, upper outer; lumpectomy:\par - Invasive ductal carcinoma, poorly differentiated, with definitive\par response to presurgical therapy, see synoptic report\par - Invasive carcinoma measures 9 mm in largest extent\par - Margins, as present in this specimen: Posterior: positive, for final posterior margin see part 6\par Anterior: 1 mm\par Medial: 2 mm, for final medial margin see part 3\par All remaining margins: over 5 mm\par - Biopsy site changes\par - Calcifications associated with wall of blood vessels\par SLNB pathology: 7. Axilla, left, non-sentinel nodes; biopsy:\par - One lymph node negative for metastatic carcinoma (0/1) 8. Axilla, left, sentinel node; biopsy: - One lymph node negative for metastatic carcinoma (0/1) - Biopsy site changes \par \par 11/11/22 ([facility]): B/L DX MMG/US: scattered areas of fbg density. Expected posttreatment changes LEFT breast. Veraform marker LEFT UOQ. LEFT breast 2.00 15cmfn there is an uncomplicated seroma approximately 4.4 x 1.5 x 2.9cm. No mammographic or US evidence of malignancy. RECOMMENDATION: mammography in 1 year. BIRADS-2: Benign \par \par 5/25/23 ([facility]) B/L DX MMG/US: scattered areas of fbg density. Expected post treatment changes LEFT breast. Veriform suture. No mammographic or sonographic evidence of malignancy. RECOMMENDATION:  Mammography in 1 year. BI-RADS 2 - Benign

## 2023-07-24 NOTE — PHYSICAL EXAM
[Asymmetrical] : asymmetrical [de-identified] : well healed post-surgical scar, density at 10:00, post-radiation skin changes

## 2023-11-29 ENCOUNTER — APPOINTMENT (OUTPATIENT)
Dept: HEMATOLOGY ONCOLOGY | Facility: CLINIC | Age: 63
End: 2023-11-29
Payer: COMMERCIAL

## 2023-11-29 VITALS
OXYGEN SATURATION: 96 % | TEMPERATURE: 97.9 F | RESPIRATION RATE: 18 BRPM | BODY MASS INDEX: 29.81 KG/M2 | DIASTOLIC BLOOD PRESSURE: 85 MMHG | HEIGHT: 62 IN | WEIGHT: 162 LBS | HEART RATE: 94 BPM | SYSTOLIC BLOOD PRESSURE: 136 MMHG

## 2023-11-29 DIAGNOSIS — C50.912 MALIGNANT NEOPLASM OF UNSPECIFIED SITE OF LEFT FEMALE BREAST: ICD-10-CM

## 2023-11-29 DIAGNOSIS — Z00.00 ENCOUNTER FOR GENERAL ADULT MEDICAL EXAMINATION W/OUT ABNORMAL FINDINGS: ICD-10-CM

## 2023-11-29 DIAGNOSIS — G62.9 POLYNEUROPATHY, UNSPECIFIED: ICD-10-CM

## 2023-11-29 PROCEDURE — 99214 OFFICE O/P EST MOD 30 MIN: CPT

## 2023-11-29 RX ORDER — LIDOCAINE AND PRILOCAINE 25; 25 MG/G; MG/G
2.5-2.5 CREAM TOPICAL
Qty: 1 | Refills: 4 | Status: DISCONTINUED | COMMUNITY
Start: 2021-06-25 | End: 2023-11-29

## 2023-11-29 RX ORDER — ONDANSETRON 4 MG/1
4 TABLET, ORALLY DISINTEGRATING ORAL
Qty: 20 | Refills: 5 | Status: DISCONTINUED | COMMUNITY
Start: 2021-06-25 | End: 2023-11-29

## 2023-11-29 RX ORDER — PROCHLORPERAZINE MALEATE 10 MG/1
10 TABLET ORAL EVERY 6 HOURS
Qty: 30 | Refills: 1 | Status: DISCONTINUED | COMMUNITY
Start: 2021-06-25 | End: 2023-11-29

## 2023-11-29 RX ORDER — UBIDECARENONE/VIT E ACET 100MG-5
1000 CAPSULE ORAL
Refills: 0 | Status: DISCONTINUED | COMMUNITY
End: 2023-11-29

## 2023-11-29 RX ORDER — LIDOCAINE HYDROCHLORIDE 20 MG/ML
2 SOLUTION ORAL; TOPICAL
Qty: 1 | Refills: 3 | Status: DISCONTINUED | COMMUNITY
Start: 2021-08-11 | End: 2023-11-29

## 2023-11-29 RX ORDER — TRAMADOL HYDROCHLORIDE 50 MG/1
50 TABLET, COATED ORAL
Qty: 30 | Refills: 0 | Status: DISCONTINUED | COMMUNITY
Start: 2021-07-14 | End: 2023-11-29

## 2024-01-08 PROBLEM — C50.912 INVASIVE DUCTAL CARCINOMA OF BREAST, LEFT: Status: ACTIVE | Noted: 2021-09-22

## 2024-01-10 ENCOUNTER — APPOINTMENT (OUTPATIENT)
Dept: BREAST CENTER | Facility: CLINIC | Age: 64
End: 2024-01-10

## 2024-01-10 DIAGNOSIS — C50.912 MALIGNANT NEOPLASM OF UNSPECIFIED SITE OF LEFT FEMALE BREAST: ICD-10-CM

## 2024-02-05 PROBLEM — C50.912 INVASIVE DUCTAL CARCINOMA OF BREAST, LEFT: Status: ACTIVE | Noted: 2021-05-18

## 2024-02-05 PROBLEM — Z80.3 FAMILY HISTORY OF BREAST CANCER: Status: ACTIVE | Noted: 2021-06-11

## 2024-02-07 ENCOUNTER — APPOINTMENT (OUTPATIENT)
Dept: BREAST CENTER | Facility: CLINIC | Age: 64
End: 2024-02-07
Payer: COMMERCIAL

## 2024-02-07 VITALS
SYSTOLIC BLOOD PRESSURE: 149 MMHG | HEIGHT: 62 IN | WEIGHT: 158 LBS | HEART RATE: 79 BPM | BODY MASS INDEX: 29.08 KG/M2 | DIASTOLIC BLOOD PRESSURE: 83 MMHG

## 2024-02-07 DIAGNOSIS — R92.30 DENSE BREASTS, UNSPECIFIED: ICD-10-CM

## 2024-02-07 DIAGNOSIS — C50.912 MALIGNANT NEOPLASM OF UNSPECIFIED SITE OF LEFT FEMALE BREAST: ICD-10-CM

## 2024-02-07 DIAGNOSIS — Z80.3 FAMILY HISTORY OF MALIGNANT NEOPLASM OF BREAST: ICD-10-CM

## 2024-02-07 PROCEDURE — 93702 BIS XTRACELL FLUID ANALYSIS: CPT | Mod: NC

## 2024-02-07 PROCEDURE — 99213 OFFICE O/P EST LOW 20 MIN: CPT

## 2024-02-07 NOTE — ASSESSMENT
[FreeTextEntry1] : 64 yo female presents for re-examination and repeat sozo measurement with history of left 2:00 5cmFN IDC ER 0%, [place] 0%, HER-2 equivocal, FISH positive s/p L breast upper outer lumpectomy on 11/19/21 path showing poorly differentiated IDC measuring.9 cm with definitive response to presurgical therapy, NSLNB (0/1) SLNB (0/1). Of note, positive family history of breast cancer in maternal aunt age 50's and maternal cousin age 40's but patient has declined genetic testing. S/p TCHP. S/p Herceptin/Perjeta. S/P XRT. Completed Kadcyla 10/5/2022.  Patient without complaint. Physical exam WNL. Sozo measurement obtained in office today WNL. Reviewed most recent imaging, B/L DX MMG/US (5/25/23) BIRADS-2. Plan for B/L DX MMG/US in May 2024, and return for re-examination and sozo measurement. Patient verbalizes understanding and is in agreement with the plan.

## 2024-02-07 NOTE — HISTORY OF PRESENT ILLNESS
[FreeTextEntry1] : 62 yo female presents for re-examination and repeat sozo measurement with history of left 2:00 5cmFN IDC, ER 0%, [place] 0%, HER-2 equivocal, FISH positive s/p L breast upper outer lumpectomy on 11/19/21 path showing poorly differentiated IDC measuring 0.9 cm with definitive response to presurgical therapy, NSLNB (0/1) SLNB (0/1). Of note, positive family history of breast cancer in maternal aunt age 50's and maternal cousin age 40's but patient has declined genetic testing.   Completed neoadjuvant TCHP on 10/13/2021 with med onc Dr. 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 Completed 20/20 fractions of radiation to left breast (prone) on 3/15/2022 with rad onc Dr. Brennan.   Patient denies palpable masses, nipple discharge, skin changes. Most recent imaging was completed on 5/25/23, B/L DX MMG/US, BIRADS-2.  TNM Stage: p T 1b N 0 M 0  AJCC Stage (8th Ed): IA.

## 2024-02-07 NOTE — DATA REVIEWED
[FreeTextEntry1] : 5/6/21: Julián DXMG & US ([facility]): heterogeneously dense, L - 2.6cm ill-defined mass in reigon of palpable concern, inferior areas of asymmetry which efface, US - L - 2.9cm ill-defined hypoechoic mass 2:00 5FN, 0.2cm complicated cyst 11:00 6FN. BIRADS 5. 5/12/21: L US Guided bx x2 ([facility]): "R shaped clip" L 2:00 15FN - 1.2cm invasive ductal carcinoma w/ assoc chronic inflammatory infiltrate, poorly differentiated , ER (-), [place] (-), HER2 Equivocal, FISH (+) L 1.1cm axillary LN 18FN - "S Shaped Clip" reactive LN, negative for carcinoma   6/18/2021 ([facility]) bilateral breast MRI showing 1. The index malignancy in the 2:00 axis of the left breast measures 3.4 cm x 2.3 cm x 2.4 cm. 2. A 3 mm focus of enhancement in the 5:00 axis of the left breast posterior depth is suspicious for malignancy. MR guided core needle biopsy is recommended. 3. Incidentally seen is a 2.4 cm T2 bright lesion in the posterior right hepatic dome. Abdominal ultrasound is recommended. MRI guided biopsy of left 5:00 axis recommended. BIRADS 4  10/19/2021 ([facility]) bilateral breast MRI 1. Post neoadjuvant chemotherapy, significant decreased in size of index lesion, left breast 2:00 axis, residual foci of enhancement measuring in total 1.3 x 0.8 cm surrounding the biopsy clip. 2. Interval resolution of the left breast 5:00 axis suspicious focus of enhancement. 3. T2 hyperintense lesion in the right hepatic dome, likely hepatic cyst. No change. 4. No MRI evidence of right breast malignancy.   11/19/2021 ([facility] pathology) left breast UOQ lumpectomy pathology: Breast, left, upper outer; lumpectomy: - Invasive ductal carcinoma, poorly differentiated, with definitive response to presurgical therapy, see synoptic report - Invasive carcinoma measures 9 mm in largest extent - Margins, as present in this specimen: Posterior: positive, for final posterior margin see part 6 Anterior: 1 mm Medial: 2 mm, for final medial margin see part 3 All remaining margins: over 5 mm - Biopsy site changes - Calcifications associated with wall of blood vessels SLNB pathology: 7. Axilla, left, non-sentinel nodes; biopsy: - One lymph node negative for metastatic carcinoma (0/1) 8. Axilla, left, sentinel node; biopsy: - One lymph node negative for metastatic carcinoma (0/1) - Biopsy site changes   11/11/22 ([facility]): B/L DX MMG/US: scattered areas of fbg density. Expected posttreatment changes LEFT breast. Veraform marker LEFT UOQ. LEFT breast 2.00 15cmfn there is an uncomplicated seroma approximately 4.4 x 1.5 x 2.9cm. No mammographic or US evidence of malignancy. RECOMMENDATION: mammography in 1 year. BIRADS-2: Benign   5/25/23 ([facility]) B/L DX MMG/US: scattered areas of fbg density. Expected post treatment changes LEFT breast. Veriform suture. No mammographic or sonographic evidence of malignancy. RECOMMENDATION:  Mammography in 1 year. BI-RADS 2 - Benign

## 2024-05-17 ENCOUNTER — NON-APPOINTMENT (OUTPATIENT)
Age: 64
End: 2024-05-17

## 2024-05-22 ENCOUNTER — NON-APPOINTMENT (OUTPATIENT)
Age: 64
End: 2024-05-22

## 2024-07-04 ENCOUNTER — NON-APPOINTMENT (OUTPATIENT)
Age: 64
End: 2024-07-04

## 2024-07-10 ENCOUNTER — APPOINTMENT (OUTPATIENT)
Dept: BREAST CENTER | Facility: CLINIC | Age: 64
End: 2024-07-10

## 2024-08-12 NOTE — REVIEW OF SYSTEMS
[Recent Change In Weight] : ~T no recent weight change [FreeTextEntry2] : achy/fatigue constantly  [de-identified] : neuropathy to bilateral hands and feet

## 2024-08-12 NOTE — ASSESSMENT
[FreeTextEntry1] : 64 yo  female  prev followed by Dr Ramirez - presented with a 3.4cm left breast mass on MRI, biopsy c/w ER/DE- Her2 positive by FISH (IHC 2+) IDC s/p neoadj TCHPx6 cycles completed 10/2021, s/p L breast lumpectomy in 11/2021 ypT1b (9mm IDC poorly differentiated)ypN0, s/p radiation therapy and Kadcyla x14 cycles completed on 10/2022 with grade 1.neuropathy  Recommned  Genetic testing Bilat MMG/US F/U echoo screening colonoscopy. Reminded patient to see GI for screening colonoscopy.  Asked patient to follow up with PMD for all other chronic medical problems and routine labs.    RTC in 6months for follow up.

## 2024-08-12 NOTE — HISTORY OF PRESENT ILLNESS
[de-identified] : 63-year-old female presents for f/u - previously followed by Dr Ramirez.   HPI    left upper quadrant palpable mass x 4 months.- mammo 5/6/21: showed left 2:00 highly suspicious mass and prominent left axillary lymph node for which an US guided core biopsy was recommended     5/17/21: left breast US guide core biopsy showed invasive poorly differentiated ductal carcinoma associated with chronic inflammatory infiltrate at the left 12:00 position. The left axillary lymph node biopsy demonstrated a reactive lymph node, negative for carcinoma. These findings are concordant with imaging. ER- [place]-, Her 2 amplified FISH. Left axillary 5/6/21: Julián DXMG & US ([facility]): heterogeneously dense, L - 2.6cm ill-defined mass in reigon of palpable concern, inferior areas of asymmetry which efface, US - L - 2.9cm ill-defined hypoechoic mass 2:00 5FN, 0.2cm complicated cyst 11:00 6FN. BIRADS 5. 5/12/21: L US Guided bx x2 ([facility]): "R shaped clip" L 2:00 15FN - 1.2cm invasive ductal carcinoma w/ assoc chronic inflammatory infiltrate, poorly differentiated , ER (-), [place] (-), HER2 Equivocal, FISH (+) L 1.1cm axillary LN 18FN - "S Shaped Clip" reactive LN, negative for carcinoma  6/18/2021 ([facility]) bilateral breast MRI showing 1. The index malignancy in the 2:00 axis of the left breast measures 3.4 cm x 2.3 cm x 2.4 cm. 2. A 3 mm focus of enhancement in the 5:00 axis of the left breast posterior depth is suspicious for malignancy. MR guided core needle biopsy is recommended. 3. Incidentally seen is a 2.4 cm T2 bright lesion in the posterior right hepatic dome. Abdominal ultrasound is recommended. MRI guided biopsy of left 5:00 axis recommended. BIRADS LN- reactive on pathology.  TCHP x 6 6/.30/2021- 10/13/2021   10/19/2021 ([facility]) bilateral breast MRI 1. Post neoadjuvant chemotherapy, significant decreased in size of index lesion, left breast 2:00 axis, residual foci of enhancement measuring in total 1.3 x 0.8 cm surrounding the biopsy clip. 2. Interval resolution of the left breast 5:00 axis suspicious focus of enhancement. 3. T2 hyperintense lesion in the right hepatic dome, likely hepatic cyst. No change. 4. No MRI evidence of right breast malignancy.  11/19/2021 ([facility] pathology) left breast UOQ lumpectomy pathology: Breast, left, upper outer; lumpectomy: - Invasive ductal carcinoma, poorly differentiated, with definitive response to presurgical therapy, see synoptic report - Invasive carcinoma measures 9 mm in largest extent - Margins, as present in this specimen: Posterior: positive, for final posterior margin see part 6 Anterior: 1 mm Medial: 2 mm, for final medial margin see part 3 All remaining margins: over 5 mm - Biopsy site changes - Calcifications associated with wall of blood vessels SLNB pathology: 7. Axilla, left, non-sentinel nodes; biopsy: - One lymph node negative for metastatic carcinoma (0/1) 8. Axilla, left, sentinel node; biopsy: - One lymph node negative for metastatic carcinoma (0/1) - Biopsy site changes  S/P RT Last treatment with Kadcyla #14 on 10/12/22 Hx grad 1-2 neuropathy  11/11/22 ([facility]): B/L DX MMG/US: scattered areas of fbg density. Expected posttreatment changes LEFT breast. Veraform marker LEFT UOQ. LEFT breast 2.00 15cmfn there is an uncomplicated seroma approximately 4.4 x 1.5 x 2.9cm. No mammographic or US evidence of malignancy. RECOMMENDATION: mammography in 1 year. BIRADS-2: Benign  5/25/23 ([facility]) B/L DX MMG/US: scattered areas of fbg density. Expected post treatment changes LEFT breast. Veriform suture. No mammographic or sonographic evidence of malignancy. RECOMMENDATION: Mammography in 1 year. BI-RADS 2 - Benign  Age at menarche- 11 Age at menopause-44 G6,P4 Age at first live birth-17 OCP-denies GYN Colonoscopy BMD ECHO  HRT-denies BF - yes  family history of breast cancer in maternal aunt age 50's and maternal cousin age 40's but patient has declined genetic testing.  Smoker-former smoker   [de-identified] : ER -neg, [place] neg, HER 2 amplified

## 2024-08-12 NOTE — PHYSICAL EXAM
[de-identified] : no discrete palpable lesions in breasts, but left breast lumpy on palpation throughout and with RT skin changes, no palpable axillary LAD.

## 2024-08-15 ENCOUNTER — RESULT REVIEW (OUTPATIENT)
Age: 64
End: 2024-08-15

## 2024-08-15 ENCOUNTER — APPOINTMENT (OUTPATIENT)
Dept: MAMMOGRAPHY | Facility: HOSPITAL | Age: 64
End: 2024-08-15
Payer: COMMERCIAL

## 2024-08-15 ENCOUNTER — APPOINTMENT (OUTPATIENT)
Dept: ULTRASOUND IMAGING | Facility: HOSPITAL | Age: 64
End: 2024-08-15
Payer: COMMERCIAL

## 2024-08-15 PROCEDURE — 77063 BREAST TOMOSYNTHESIS BI: CPT | Mod: 26

## 2024-08-15 PROCEDURE — 77067 SCR MAMMO BI INCL CAD: CPT | Mod: 26

## 2024-08-15 PROCEDURE — 76641 ULTRASOUND BREAST COMPLETE: CPT | Mod: 26,50

## 2024-08-15 NOTE — ASSESSMENT
[FreeTextEntry1] : 62 yo  female  prev followed by Dr Ramirez - presented with a 3.4cm left breast mass on MRI, biopsy c/w ER/IN- Her2 positive by FISH (IHC 2+) IDC s/p neoadj TCHPx6 cycles completed 10/2021, s/p L breast lumpectomy in 11/2021 ypT1b (9mm IDC poorly differentiated)ypN0, s/p radiation therapy and Kadcyla x14 cycles completed on 10/2022 with grade 1.neuropathy  Recommned  Genetic testing Bilat MMG/US F/U echoo screening colonoscopy. Reminded patient to see GI for screening colonoscopy.  Asked patient to follow up with PMD for all other chronic medical problems and routine labs.    RTC in 6months for follow up.

## 2024-08-15 NOTE — REVIEW OF SYSTEMS
[Recent Change In Weight] : ~T no recent weight change [FreeTextEntry2] : achy/fatigue constantly  [de-identified] : neuropathy to bilateral hands and feet

## 2024-08-15 NOTE — PHYSICAL EXAM
[de-identified] : no discrete palpable lesions in breasts, but left breast lumpy on palpation throughout and with RT skin changes, no palpable axillary LAD.

## 2024-08-15 NOTE — HISTORY OF PRESENT ILLNESS
[de-identified] : 63-year-old female presents for f/u - previously followed by Dr Ramirez.   HPI    left upper quadrant palpable mass x 4 months.- mammo 5/6/21: showed left 2:00 highly suspicious mass and prominent left axillary lymph node for which an US guided core biopsy was recommended     5/17/21: left breast US guide core biopsy showed invasive poorly differentiated ductal carcinoma associated with chronic inflammatory infiltrate at the left 12:00 position. The left axillary lymph node biopsy demonstrated a reactive lymph node, negative for carcinoma. These findings are concordant with imaging. ER- MA-, Her 2 amplified FISH. Left axillary 5/6/21: Julián DXMG & US ([facility]): heterogeneously dense, L - 2.6cm ill-defined mass in reigon of palpable concern, inferior areas of asymmetry which efface, US - L - 2.9cm ill-defined hypoechoic mass 2:00 5FN, 0.2cm complicated cyst 11:00 6FN. BIRADS 5. 5/12/21: L US Guided bx x2 ([facility]): "R shaped clip" L 2:00 15FN - 1.2cm invasive ductal carcinoma w/ assoc chronic inflammatory infiltrate, poorly differentiated , ER (-), MA (-), HER2 Equivocal, FISH (+) L 1.1cm axillary LN 18FN - "S Shaped Clip" reactive LN, negative for carcinoma  6/18/2021 ([facility]) bilateral breast MRI showing 1. The index malignancy in the 2:00 axis of the left breast measures 3.4 cm x 2.3 cm x 2.4 cm. 2. A 3 mm focus of enhancement in the 5:00 axis of the left breast posterior depth is suspicious for malignancy. MR guided core needle biopsy is recommended. 3. Incidentally seen is a 2.4 cm T2 bright lesion in the posterior right hepatic dome. Abdominal ultrasound is recommended. MRI guided biopsy of left 5:00 axis recommended. BIRADS LN- reactive on pathology.  TCHP x 6 6/.30/2021- 10/13/2021   10/19/2021 ([facility]) bilateral breast MRI 1. Post neoadjuvant chemotherapy, significant decreased in size of index lesion, left breast 2:00 axis, residual foci of enhancement measuring in total 1.3 x 0.8 cm surrounding the biopsy clip. 2. Interval resolution of the left breast 5:00 axis suspicious focus of enhancement. 3. T2 hyperintense lesion in the right hepatic dome, likely hepatic cyst. No change. 4. No MRI evidence of right breast malignancy.  11/19/2021 ([facility] pathology) left breast UOQ lumpectomy pathology: Breast, left, upper outer; lumpectomy: - Invasive ductal carcinoma, poorly differentiated, with definitive response to presurgical therapy, see synoptic report - Invasive carcinoma measures 9 mm in largest extent - Margins, as present in this specimen: Posterior: positive, for final posterior margin see part 6 Anterior: 1 mm Medial: 2 mm, for final medial margin see part 3 All remaining margins: over 5 mm - Biopsy site changes - Calcifications associated with wall of blood vessels SLNB pathology: 7. Axilla, left, non-sentinel nodes; biopsy: - One lymph node negative for metastatic carcinoma (0/1) 8. 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 Expected posttreatment changes LEFT breast. Veraform marker LEFT UOQ. LEFT breast 2.00 15cmfn there is an uncomplicated seroma approximately 4.4 x 1.5 x 2.9cm. No mammographic or US evidence of malignancy. RECOMMENDATION: mammography in 1 year. BIRADS-2: Benign  5/25/23 ([facility]) B/L DX MMG/US: scattered areas of fbg density. Expected post treatment changes LEFT breast. Veriform suture. No mammographic or sonographic evidence of malignancy. RECOMMENDATION: Mammography in 1 year. BI-RADS 2 - Benign  Age at menarche- 11 Age at menopause-44 G6,P4 Age at first live birth-17 OCP-denies GYN Colonoscopy BMD ECHO  HRT-denies BF - yes  family history of breast cancer in maternal aunt age 50's and maternal cousin age 40's but patient has declined genetic testing.  Smoker-former smoker   [de-identified] : ER -neg, TX neg, HER 2 amplified

## 2024-08-22 ENCOUNTER — APPOINTMENT (OUTPATIENT)
Dept: HEMATOLOGY ONCOLOGY | Facility: CLINIC | Age: 64
End: 2024-08-22

## 2024-08-22 ENCOUNTER — NON-APPOINTMENT (OUTPATIENT)
Age: 64
End: 2024-08-22

## 2024-09-16 NOTE — ASSESSMENT
[FreeTextEntry1] : 64 yo female presents with history of left 2:00 5cmFN IDC ER 0%, NJ 0%, HER-2 equivocal, FISH positive s/p L breast upper outer lumpectomy on 11/19/21 path showing poorly differentiated IDC measuring.9 cm with definitive response to presurgical therapy, NSLNB (0/1) SLNB (0/1). Of note, positive family history of breast cancer in maternal aunt age 50's and maternal cousin age 40's but patient has declined genetic testing. S/p TCHP. S/p Herceptin/Perjeta. S/P XRT. Completed Kadcyla 10/5/2022.  Patient without complaint. Physical exam WNL. Sozo measurement obtained in office today WNL. Reviewed most recent imaging, B/L sMMG/US (8/15/24) BIRADS-2. Plan for sMMG/US in Aug 2025, and return for re-examination and sozo measurement. Patient verbalizes understanding and is in agreement with the plan.

## 2024-09-16 NOTE — DATA REVIEWED
[FreeTextEntry1] : 5/6/21: Julián DXMG & US ([facility]): heterogeneously dense, L - 2.6cm ill-defined mass in reigon of palpable concern, inferior areas of asymmetry which efface, US - L - 2.9cm ill-defined hypoechoic mass 2:00 5FN, 0.2cm complicated cyst 11:00 6FN. BIRADS 5. 5/12/21: L US Guided bx x2 ([facility]): "R shaped clip" L 2:00 15FN - 1.2cm invasive ductal carcinoma w/ assoc chronic inflammatory infiltrate, poorly differentiated , ER (-), CT (-), HER2 Equivocal, FISH (+) L 1.1cm axillary LN 18FN - "S Shaped Clip" reactive LN, negative for carcinoma   6/18/2021 ([facility]) bilateral breast MRI showing 1. The index malignancy in the 2:00 axis of the left breast measures 3.4 cm x 2.3 cm x 2.4 cm. 2. A 3 mm focus of enhancement in the 5:00 axis of the left breast posterior depth is suspicious for malignancy. MR guided core needle biopsy is recommended. 3. Incidentally seen is a 2.4 cm T2 bright lesion in the posterior right hepatic dome. Abdominal ultrasound is recommended. MRI guided biopsy of left 5:00 axis recommended. BIRADS 4  10/19/2021 ([facility]) bilateral breast MRI 1. Post neoadjuvant chemotherapy, significant decreased in size of index lesion, left breast 2:00 axis, residual foci of enhancement measuring in total 1.3 x 0.8 cm surrounding the biopsy clip. 2. Interval resolution of the left breast 5:00 axis suspicious focus of enhancement. 3. T2 hyperintense lesion in the right hepatic dome, likely hepatic cyst. No change. 4. No MRI evidence of right breast malignancy.   11/19/2021 ([facility] pathology) left breast UOQ lumpectomy pathology: Breast, left, upper outer; lumpectomy: - Invasive ductal carcinoma, poorly differentiated, with definitive response to presurgical therapy, see synoptic report - Invasive carcinoma measures 9 mm in largest extent - Margins, as present in this specimen: Posterior: positive, for final posterior margin see part 6 Anterior: 1 mm Medial: 2 mm, for final medial margin see part 3 All remaining margins: over 5 mm - Biopsy site changes - Calcifications associated with wall of blood vessels SLNB pathology: 7. Axilla, left, non-sentinel nodes; biopsy: - One lymph node negative for metastatic carcinoma (0/1) 8. Axilla, left, sentinel node; biopsy: - One lymph node negative for metastatic carcinoma (0/1) - Biopsy site changes   11/11/22 ([facility]): B/L DX MMG/US: scattered areas of fbg density. Expected posttreatment changes LEFT breast. Veraform marker LEFT UOQ. LEFT breast 2.00 15cmfn there is an uncomplicated seroma approximately 4.4 x 1.5 x 2.9cm. No mammographic or US evidence of malignancy. RECOMMENDATION: mammography in 1 year. BIRADS-2: Benign   5/25/23 ([facility]) B/L DX MMG/US: scattered areas of fbg density. Expected post treatment changes LEFT breast. Veriform suture. No mammographic or sonographic evidence of malignancy. RECOMMENDATION:  Mammography in 1 year. BI-RADS 2 - Benign  8/15/24 ([facility]) B/L sMMG/US: scattered areas of fbg density. LEFT seroma. No mammographic or US  evidence of malignancy. RECOMMENDATION:  Mammography in 1 year. BI-RADS 2

## 2024-09-16 NOTE — HISTORY OF PRESENT ILLNESS
[FreeTextEntry1] : 64 yo female presents with history of left 2:00 5cmFN IDC, ER 0%, SD 0%, HER-2 equivocal, FISH positive s/p L breast upper outer lumpectomy on 11/19/21 path showing poorly differentiated IDC measuring 0.9 cm with definitive response to presurgical therapy, NSLNB (0/1) SLNB (0/1). Of note, positive family history of breast cancer in maternal aunt age 50's and maternal cousin age 40's but patient has declined genetic testing.   Completed neoadjuvant TCHP on 10/13/2021 with med onc Dr. 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 Completed 20/20 fractions of radiation to left breast (prone) on 3/15/2022 with rad onc Dr. Brennan.   Patient denies palpable masses, nipple discharge, skin changes. Most recent imaging: B/L sMMG/US (8/15/24), BIRADS-2.  TNM Stage: p T 1b N 0 M 0  AJCC Stage (8th Ed): IA.

## 2024-09-18 ENCOUNTER — APPOINTMENT (OUTPATIENT)
Dept: BREAST CENTER | Facility: CLINIC | Age: 64
End: 2024-09-18

## 2024-09-18 DIAGNOSIS — C50.912 MALIGNANT NEOPLASM OF UNSPECIFIED SITE OF LEFT FEMALE BREAST: ICD-10-CM

## 2025-06-25 ENCOUNTER — APPOINTMENT (OUTPATIENT)
Dept: HEMATOLOGY ONCOLOGY | Facility: CLINIC | Age: 65
End: 2025-06-25